# Patient Record
Sex: MALE | Race: WHITE | NOT HISPANIC OR LATINO | Employment: FULL TIME | URBAN - METROPOLITAN AREA
[De-identification: names, ages, dates, MRNs, and addresses within clinical notes are randomized per-mention and may not be internally consistent; named-entity substitution may affect disease eponyms.]

---

## 2017-01-26 ENCOUNTER — ALLSCRIPTS OFFICE VISIT (OUTPATIENT)
Dept: OTHER | Facility: OTHER | Age: 57
End: 2017-01-26

## 2018-01-12 NOTE — RESULT NOTES
Verified Results  ECHO COMPLETE WITH CONTRAST IF INDICATED [id number] 10:00AM Estiven Silva Order Number: [id number]     Test Name Result Flag Reference   ECHO COMPLETE WITH CONTRAST IF INDICATED (Report)     [facility], [address] [place] [address]   [phone]     Transthoracic Echocardiogram   2D, M-mode, Doppler, and Color Doppler     Study date: 2016     Patient: Chayo Gimenez   MR number: [id number]   Account number: [de-identified]   : 1960   Age: 48 years   Gender: Male   Status: Outpatient   Location: [facility]   Height: 69 in   Weight: 191 lb   BP: 150/ 82 mmHg     Indications: Cardiomyopathy  Diagnoses: I42 9 - Cardiomyopathy, unspecified     Sonographer: Walsh RCS   Primary Physician: Kade Blair DO   Referring Physician: Patty Miranda MD   Group: [facility]   Interpreting Physician: Patty Miranda MD     SUMMARY     LEFT VENTRICLE:   Ejection fraction was estimated to be 50 %  MITRAL VALVE:   There was suggestion of mild flail motion of one of the chords  This has been   noted in the past    There was trace to mild regurgitation  TRICUSPID VALVE:   There was trace regurgitation  SUMMARY MEASUREMENTS   Unspecified Scan Mode measurements:   Aortic Valve:  HR was 73 {H  B }/min  Left Ventricle:  HR was 61 {H  B }/min  HISTORY: PRIOR HISTORY: Congestive heart failure  Cardiomyopathy  Risk factors:   hypertension and a history of current cigarette use (within the last month)  PRIOR PROCEDURES: Diagnostic cath  PROCEDURE: The study was performed in the [address] [place] [address]  This   was a routine study  The transthoracic approach was used  The study included   complete 2D imaging, M-mode, complete spectral Doppler, and color Doppler  Image quality was adequate  LEFT VENTRICLE: Size was normal  Ejection fraction was estimated to be 50 %     DOPPLER: Left ventricular diastolic function parameters were normal      RIGHT VENTRICLE: The size was normal  Systolic function was normal  Wall   thickness was normal      LEFT ATRIUM: Size was normal      RIGHT ATRIUM: Size was normal      MITRAL VALVE: Valve structure was normal  There was normal leaflet separation  There was suggestion of mild flail motion of one of the chords  This has been   noted in the past  DOPPLER: The transmitral velocity was within the normal   range  There was no evidence for stenosis  There was trace to mild   regurgitation  AORTIC VALVE: The valve was trileaflet  Leaflets exhibited mildly increased   thickness and normal cuspal separation  DOPPLER: Transaortic velocity was   within the normal range  There was no evidence for stenosis  There was no   regurgitation  TRICUSPID VALVE: The valve structure was normal  There was normal leaflet   separation  DOPPLER: The transtricuspid velocity was within the normal range  There was no evidence for stenosis  There was trace regurgitation  PULMONIC VALVE: Leaflets exhibited normal thickness, no calcification, and   normal cuspal separation  DOPPLER: The transpulmonic velocity was within the   normal range  There was no regurgitation  PERICARDIUM: There was no pericardial effusion  The pericardium was normal in   appearance  AORTA: The root exhibited normal size  SYSTEM MEASUREMENT TABLES     Apical four chamber   4 chamber Left Atrium Volume Index; Planimetry; End Systole; Apical four   chamber;: 16 65 cm2 Left Ventricular End Diastolic Volume; Method of Disks,   Single Plane; Apical four chamber;: 81 6 ml Left Ventricular End Systolic   Volume; Method of Disks, Single Plane; Apical four chamber;: 36 1 ml Right   Atrium Systolic Area; Planimetry; End Systole; Apical four chamber;: 19 37 cm2   Right Ventricular Internal Diastolic Dimension; End Diastole;  Apical four   chamber;: 42 mm   TAPSE: 20 9 mm     Unspecified Scan Mode Aortic Root Diameter; End Systole;: 36 8 mm   Gradient Pressure, Peak; Simplified Bernoulli; Antegrade Flow; Systole;: 5 4   mm[Hg] Gradient pressure, average; Simplified Bernoulli; Antegrade Flow;   Systole;: 3 1 mm[Hg]   HR: 73 {H  B }/min   Left atrial diameter; End Diastole;: 36 mm   Cardiac Output; Teichholz; Systole;: 3 7 L/min   HR: 61 {H  B }/min   Interventricular Septum Diastolic Thickness; Teichholz; End Diastole;: 7 7 mm   Left Ventricle Internal End Diastolic Dimension; Teichholz;: 49 4 mm   Left Ventricle Internal Systolic Dimension; Teichholz; End Systole;: 35 4 mm   Left Ventricle Posterior Wall Diastolic Thickness; Teichholz; End Diastole;:   7 6 mm   Left Ventricular Ejection Fraction; Teichholz;: 54 5 %   Stroke volume;  Teichholz; Systole;: 62 7 ml   Mitral Valve Area; Area by Pressure Half-Time; Systole;: 2 72 cm2   Mitral Valve E to A Ratio; Systole;: 1 38     [facility]     Prepared and electronically signed by     Quirino Cotter MD   Signed 14-Jul-2016 15:07:09

## 2018-01-14 VITALS
BODY MASS INDEX: 24.88 KG/M2 | HEIGHT: 69 IN | HEART RATE: 86 BPM | WEIGHT: 168 LBS | SYSTOLIC BLOOD PRESSURE: 158 MMHG | DIASTOLIC BLOOD PRESSURE: 80 MMHG | OXYGEN SATURATION: 97 %

## 2018-02-06 ENCOUNTER — HOSPITAL ENCOUNTER (OUTPATIENT)
Facility: HOSPITAL | Age: 58
Setting detail: OBSERVATION
LOS: 1 days | Discharge: HOME | End: 2018-02-06
Attending: EMERGENCY MEDICINE | Admitting: GENERAL PRACTICE
Payer: COMMERCIAL

## 2018-02-06 ENCOUNTER — APPOINTMENT (EMERGENCY)
Dept: RADIOLOGY | Facility: HOSPITAL | Age: 58
End: 2018-02-06
Payer: COMMERCIAL

## 2018-02-06 ENCOUNTER — APPOINTMENT (INPATIENT)
Dept: NON INVASIVE DIAGNOSTICS | Facility: HOSPITAL | Age: 58
End: 2018-02-06
Attending: INTERNAL MEDICINE
Payer: COMMERCIAL

## 2018-02-06 ENCOUNTER — APPOINTMENT (INPATIENT)
Dept: NON INVASIVE DIAGNOSTICS | Facility: HOSPITAL | Age: 58
End: 2018-02-06
Payer: COMMERCIAL

## 2018-02-06 VITALS
DIASTOLIC BLOOD PRESSURE: 83 MMHG | SYSTOLIC BLOOD PRESSURE: 128 MMHG | BODY MASS INDEX: 23.7 KG/M2 | RESPIRATION RATE: 16 BRPM | WEIGHT: 160 LBS | TEMPERATURE: 97.7 F | OXYGEN SATURATION: 98 % | HEIGHT: 69 IN | HEART RATE: 73 BPM

## 2018-02-06 DIAGNOSIS — I42.9 CARDIOMYOPATHY (HCC): ICD-10-CM

## 2018-02-06 DIAGNOSIS — R07.9 CHEST PAIN: ICD-10-CM

## 2018-02-06 DIAGNOSIS — R55 NEAR SYNCOPE: Primary | ICD-10-CM

## 2018-02-06 PROBLEM — I10 HYPERTENSION: Chronic | Status: ACTIVE | Noted: 2018-02-06

## 2018-02-06 PROBLEM — J45.909 ASTHMA: Chronic | Status: ACTIVE | Noted: 2018-02-06

## 2018-02-06 PROBLEM — F17.200 TOBACCO DEPENDENCE: Chronic | Status: ACTIVE | Noted: 2018-02-06

## 2018-02-06 LAB
ALBUMIN SERPL BCP-MCNC: 4.4 G/DL (ref 3.5–5)
ALP SERPL-CCNC: 47 U/L (ref 46–116)
ALT SERPL W P-5'-P-CCNC: 26 U/L (ref 12–78)
ANION GAP SERPL CALCULATED.3IONS-SCNC: 10 MMOL/L (ref 4–13)
ANION GAP SERPL CALCULATED.3IONS-SCNC: 8 MMOL/L (ref 4–13)
AST SERPL W P-5'-P-CCNC: 14 U/L (ref 5–45)
ATRIAL RATE: 77 BPM
BASOPHILS # BLD AUTO: 0.1 THOUSANDS/ΜL (ref 0–0.1)
BASOPHILS NFR BLD AUTO: 1 % (ref 0–1)
BILIRUB SERPL-MCNC: 0.3 MG/DL (ref 0.2–1)
BUN SERPL-MCNC: 21 MG/DL (ref 5–25)
BUN SERPL-MCNC: 23 MG/DL (ref 5–25)
CALCIUM SERPL-MCNC: 9.5 MG/DL (ref 8.3–10.1)
CALCIUM SERPL-MCNC: 9.8 MG/DL (ref 8.3–10.1)
CHEST PAIN STATEMENT: NORMAL
CHLORIDE SERPL-SCNC: 100 MMOL/L (ref 100–108)
CHLORIDE SERPL-SCNC: 102 MMOL/L (ref 100–108)
CHOLEST SERPL-MCNC: 201 MG/DL (ref 50–200)
CO2 SERPL-SCNC: 27 MMOL/L (ref 21–32)
CO2 SERPL-SCNC: 29 MMOL/L (ref 21–32)
CREAT SERPL-MCNC: 0.82 MG/DL (ref 0.6–1.3)
CREAT SERPL-MCNC: 0.88 MG/DL (ref 0.6–1.3)
EOSINOPHIL # BLD AUTO: 0.06 THOUSAND/ΜL (ref 0–0.61)
EOSINOPHIL NFR BLD AUTO: 1 % (ref 0–6)
ERYTHROCYTE [DISTWIDTH] IN BLOOD BY AUTOMATED COUNT: 12.8 % (ref 11.6–15.1)
ERYTHROCYTE [DISTWIDTH] IN BLOOD BY AUTOMATED COUNT: 12.8 % (ref 11.6–15.1)
GFR SERPL CREATININE-BSD FRML MDRD: 96 ML/MIN/1.73SQ M
GFR SERPL CREATININE-BSD FRML MDRD: 99 ML/MIN/1.73SQ M
GLUCOSE SERPL-MCNC: 106 MG/DL (ref 65–140)
GLUCOSE SERPL-MCNC: 112 MG/DL (ref 65–140)
HCT VFR BLD AUTO: 45.8 % (ref 36.5–49.3)
HCT VFR BLD AUTO: 46.2 % (ref 36.5–49.3)
HDLC SERPL-MCNC: 87 MG/DL (ref 40–60)
HGB BLD-MCNC: 15.9 G/DL (ref 12–17)
HGB BLD-MCNC: 15.9 G/DL (ref 12–17)
HOLD SPECIMEN: NORMAL
LDLC SERPL CALC-MCNC: 101 MG/DL (ref 0–100)
LYMPHOCYTES # BLD AUTO: 1.29 THOUSANDS/ΜL (ref 0.6–4.47)
LYMPHOCYTES NFR BLD AUTO: 17 % (ref 14–44)
MAX DIASTOLIC BP: 80 MMHG
MAX HEART RATE: 153 BPM
MAX PREDICTED HEART RATE: 164 BPM
MAX. SYSTOLIC BP: 200 MMHG
MCH RBC QN AUTO: 31.6 PG (ref 26.8–34.3)
MCH RBC QN AUTO: 31.9 PG (ref 26.8–34.3)
MCHC RBC AUTO-ENTMCNC: 34.4 G/DL (ref 31.4–37.4)
MCHC RBC AUTO-ENTMCNC: 34.7 G/DL (ref 31.4–37.4)
MCV RBC AUTO: 92 FL (ref 82–98)
MCV RBC AUTO: 92 FL (ref 82–98)
MONOCYTES # BLD AUTO: 0.44 THOUSAND/ΜL (ref 0.17–1.22)
MONOCYTES NFR BLD AUTO: 6 % (ref 4–12)
NEUTROPHILS # BLD AUTO: 5.84 THOUSANDS/ΜL (ref 1.85–7.62)
NEUTS SEG NFR BLD AUTO: 75 % (ref 43–75)
NRBC BLD AUTO-RTO: 0 /100 WBCS
P AXIS: 68 DEGREES
PLATELET # BLD AUTO: 316 THOUSANDS/UL (ref 149–390)
PLATELET # BLD AUTO: 324 THOUSANDS/UL (ref 149–390)
PMV BLD AUTO: 9 FL (ref 8.9–12.7)
PMV BLD AUTO: 9.1 FL (ref 8.9–12.7)
POTASSIUM SERPL-SCNC: 4.4 MMOL/L (ref 3.5–5.3)
POTASSIUM SERPL-SCNC: 5.2 MMOL/L (ref 3.5–5.3)
PR INTERVAL: 130 MS
PROT SERPL-MCNC: 7.8 G/DL (ref 6.4–8.2)
PROTOCOL NAME: NORMAL
QRS AXIS: 75 DEGREES
QRSD INTERVAL: 100 MS
QT INTERVAL: 380 MS
QTC INTERVAL: 430 MS
RBC # BLD AUTO: 4.99 MILLION/UL (ref 3.88–5.62)
RBC # BLD AUTO: 5.03 MILLION/UL (ref 3.88–5.62)
SODIUM SERPL-SCNC: 137 MMOL/L (ref 136–145)
SODIUM SERPL-SCNC: 139 MMOL/L (ref 136–145)
T WAVE AXIS: 70 DEGREES
TARGET HR FORMULA: NORMAL
TEST INDICATION: NORMAL
TIME IN EXERCISE PHASE: NORMAL
TRIGL SERPL-MCNC: 64 MG/DL
TROPONIN I SERPL-MCNC: <0.02 NG/ML
VENTRICULAR RATE: 77 BPM
WBC # BLD AUTO: 7.82 THOUSAND/UL (ref 4.31–10.16)
WBC # BLD AUTO: 8.67 THOUSAND/UL (ref 4.31–10.16)

## 2018-02-06 PROCEDURE — 93005 ELECTROCARDIOGRAM TRACING: CPT | Performed by: PHYSICIAN ASSISTANT

## 2018-02-06 PROCEDURE — 99223 1ST HOSP IP/OBS HIGH 75: CPT | Performed by: PHYSICIAN ASSISTANT

## 2018-02-06 PROCEDURE — 93010 ELECTROCARDIOGRAM REPORT: CPT | Performed by: INTERNAL MEDICINE

## 2018-02-06 PROCEDURE — 85025 COMPLETE CBC W/AUTO DIFF WBC: CPT | Performed by: EMERGENCY MEDICINE

## 2018-02-06 PROCEDURE — 84484 ASSAY OF TROPONIN QUANT: CPT | Performed by: EMERGENCY MEDICINE

## 2018-02-06 PROCEDURE — 85027 COMPLETE CBC AUTOMATED: CPT | Performed by: PHYSICIAN ASSISTANT

## 2018-02-06 PROCEDURE — 84484 ASSAY OF TROPONIN QUANT: CPT | Performed by: PHYSICIAN ASSISTANT

## 2018-02-06 PROCEDURE — 80048 BASIC METABOLIC PNL TOTAL CA: CPT | Performed by: PHYSICIAN ASSISTANT

## 2018-02-06 PROCEDURE — 80053 COMPREHEN METABOLIC PANEL: CPT | Performed by: EMERGENCY MEDICINE

## 2018-02-06 PROCEDURE — 93351 STRESS TTE COMPLETE: CPT | Performed by: INTERNAL MEDICINE

## 2018-02-06 PROCEDURE — 99236 HOSP IP/OBS SAME DATE HI 85: CPT | Performed by: PHYSICIAN ASSISTANT

## 2018-02-06 PROCEDURE — 99285 EMERGENCY DEPT VISIT HI MDM: CPT

## 2018-02-06 PROCEDURE — 93005 ELECTROCARDIOGRAM TRACING: CPT

## 2018-02-06 PROCEDURE — 71046 X-RAY EXAM CHEST 2 VIEWS: CPT

## 2018-02-06 PROCEDURE — 36415 COLL VENOUS BLD VENIPUNCTURE: CPT

## 2018-02-06 PROCEDURE — 93350 STRESS TTE ONLY: CPT

## 2018-02-06 PROCEDURE — 80061 LIPID PANEL: CPT | Performed by: PHYSICIAN ASSISTANT

## 2018-02-06 PROCEDURE — 99244 OFF/OP CNSLTJ NEW/EST MOD 40: CPT | Performed by: INTERNAL MEDICINE

## 2018-02-06 RX ORDER — ALBUTEROL SULFATE 90 UG/1
1 AEROSOL, METERED RESPIRATORY (INHALATION) EVERY 4 HOURS PRN
Qty: 1 INHALER | Refills: 0 | Status: SHIPPED | OUTPATIENT
Start: 2018-02-06

## 2018-02-06 RX ORDER — ACETAMINOPHEN 325 MG/1
650 TABLET ORAL EVERY 4 HOURS PRN
Status: DISCONTINUED | OUTPATIENT
Start: 2018-02-06 | End: 2018-02-06 | Stop reason: HOSPADM

## 2018-02-06 RX ORDER — ASPIRIN 81 MG/1
81 TABLET, CHEWABLE ORAL DAILY
Status: DISCONTINUED | OUTPATIENT
Start: 2018-02-06 | End: 2018-02-06 | Stop reason: HOSPADM

## 2018-02-06 RX ORDER — MONTELUKAST SODIUM 10 MG/1
10 TABLET ORAL DAILY
Status: DISCONTINUED | OUTPATIENT
Start: 2018-02-06 | End: 2018-02-06 | Stop reason: HOSPADM

## 2018-02-06 RX ORDER — LOSARTAN POTASSIUM 25 MG/1
25 TABLET ORAL DAILY
Status: DISCONTINUED | OUTPATIENT
Start: 2018-02-06 | End: 2018-02-06 | Stop reason: HOSPADM

## 2018-02-06 RX ORDER — LOSARTAN POTASSIUM 25 MG/1
1 TABLET ORAL DAILY
COMMUNITY
End: 2018-04-18 | Stop reason: SDUPTHER

## 2018-02-06 RX ORDER — CARVEDILOL 3.12 MG/1
3.12 TABLET ORAL 2 TIMES DAILY
Status: DISCONTINUED | OUTPATIENT
Start: 2018-02-06 | End: 2018-02-06 | Stop reason: HOSPADM

## 2018-02-06 RX ORDER — ONDANSETRON 2 MG/ML
4 INJECTION INTRAMUSCULAR; INTRAVENOUS EVERY 6 HOURS PRN
Status: DISCONTINUED | OUTPATIENT
Start: 2018-02-06 | End: 2018-02-06 | Stop reason: HOSPADM

## 2018-02-06 RX ORDER — MONTELUKAST SODIUM 10 MG/1
TABLET ORAL
COMMUNITY

## 2018-02-06 RX ORDER — CARVEDILOL 3.12 MG/1
1 TABLET ORAL 2 TIMES DAILY
COMMUNITY
End: 2018-05-10 | Stop reason: SDUPTHER

## 2018-02-06 RX ORDER — ALBUTEROL SULFATE 90 UG/1
1 AEROSOL, METERED RESPIRATORY (INHALATION) EVERY 4 HOURS PRN
Status: DISCONTINUED | OUTPATIENT
Start: 2018-02-06 | End: 2018-02-06 | Stop reason: HOSPADM

## 2018-02-06 RX ADMIN — CARVEDILOL 3.12 MG: 3.12 TABLET, FILM COATED ORAL at 08:06

## 2018-02-06 RX ADMIN — LOSARTAN POTASSIUM 25 MG: 25 TABLET, FILM COATED ORAL at 08:06

## 2018-02-06 RX ADMIN — MONTELUKAST SODIUM 10 MG: 10 TABLET, FILM COATED ORAL at 08:06

## 2018-02-06 RX ADMIN — CARVEDILOL 3.12 MG: 3.12 TABLET, FILM COATED ORAL at 17:42

## 2018-02-06 RX ADMIN — ASPIRIN 81 MG 81 MG: 81 TABLET ORAL at 08:06

## 2018-02-06 RX ADMIN — FLUTICASONE PROPIONATE AND SALMETEROL 1 PUFF: 50; 250 POWDER RESPIRATORY (INHALATION) at 10:19

## 2018-02-06 RX ADMIN — TIOTROPIUM BROMIDE 18 MCG: 18 CAPSULE ORAL; RESPIRATORY (INHALATION) at 10:19

## 2018-02-06 RX ADMIN — ENOXAPARIN SODIUM 40 MG: 40 INJECTION SUBCUTANEOUS at 08:06

## 2018-02-06 NOTE — ED NOTES
Ten minute called to Noam Reed RN  Made aware that patient will need tele monitor on arrival to unit        Albertine Labs, RN  02/06/18 5944

## 2018-02-06 NOTE — ED PROVIDER NOTES
History  Chief Complaint   Patient presents with    Shortness of Breath     pt co of sob, neck pain and tingeling in both hands, +cold sweats, onset yesterday      HPI  65 y o  male presents with 4 hours of substernal chest pain without radiation  Patient describes moderate dull ache that came on suddenly while placing groceries, has been continuous and has improved since arrival  Patient states nothing worsens the pain and nothing improves the pain  Patient denies exertional component to his chest pain  Patient states he became diaphoretic and lightheaded during the episode with near syncope  Patient recalls all of the episode but was told by one of his coworkers that he suddenly became pale  Patient has an unclear history of a prior cardiomyopathy  Per the patient, ejection fraction 25% that upon most recent echocardiogram was noted to be 50%  Patient associates dyspnea that has resolved, tingling in the bilateral hands that has also improved, diaphoresis at the time of the onset, nausea earlier that has improved; denies fever/chills, cough, hemoptysis, weakness, dizziness, back pain, syncope, focal weakness or numbness, leg pain or swelling  All other review of systems reviewed and noted to be negative  The patient affirms any history of congestive heart failure and valvular head disease  Patient denies any recent change in medications  Patient denies any use of alcohol or illicit drugs  Objective:  PHYSICAL EXAM:  Constitutional :  Non-toxic  Well developed, well-nourished with no acute distress  Eyes:  PERRL, EOMI  No resting nystagmus or with gaze fixation  HENT: Atraumatic  Neck: no carotid bruit, no tenderness to palpitation  CV:  Regular rate and rhythm, no murmur  Peripheral pulses intact and equal   Respiratory:  Lungs clear to auscultation bilaterally  Abdomen:  Soft, non-tender, non-distended    Back:  No vertebral tenderness  Skin:  Normal color, warm and dry  Extremities: Non-tender, no pedal edema  Neuro:  Alert and answers questions appropriately  Medical Decision Making    Patient presenting with multiple symptoms, most concerning for chest pain and near syncopal episode  These represent a broad differential   EKG obtained and demonstrates normal sinus rhythm  CBC will be obtained to evaluate hemoglobin and hematocrit to identify potential for anemia (specifically hematocrit less than 30) due to high risk factor  Considering patient's chest pain and dyspnea, will obtain cardiac evaluation  Patient has cardiomyopathy considered a high risk factor for syncope  Patient will be placed in observation for further cardiac monitoring and evaluation by appropriate specialists if any events identified  Patient updated and agreed with the plan  HEART SCORE  History: 1   -Highly suspicious +2, Moderately suspicious +1   -Likelihood ratios: radiation to both arms (2 6), similar to prior ischemia (2 2), change in pattern over 24 hours (2 0)  EK (ST changes +2, Other abnormalities +1)  Age: 1 (greater 65 +2, 45-65 +1)  Risk factors: 2 (3 or more +2, 1-2 factors +1)  Troponin: 0 (greater than 3 times limit +2)  Total: 4    Regarding patient's chest pain, patient has a HEART score of 4  EKG obtained and reviewed independently by myself which demonstrated normal sinus rhythm no acute ST segment changes  CXR obtained and reviewed independently by myself; this did not demonstrate any acute abnormalities  Labs obtained and reviewed  Patient's HEART score demonstrated high risk for ACS and, patient likely to benefit from continued observation in the hospital for serial monitoring of troponin and cardiac monitoring  Patient in agreement with this plan  Prior to Admission Medications   Prescriptions Last Dose Informant Patient Reported?  Taking?   aspirin 81 MG tablet   Yes Yes   Sig: Take 1 tablet by mouth daily   carvedilol (COREG) 3 125 mg tablet   Yes No Sig: Take 1 tablet by mouth 2 (two) times a day   fluticasone-salmeterol (ADVAIR DISKUS) 250-50 mcg/dose inhaler   Yes No   Sig: Inhale   losartan (COZAAR) 25 mg tablet   Yes No   Sig: Take 1 tablet by mouth daily   montelukast (SINGULAIR) 10 mg tablet   Yes No   Sig: Take by mouth   tiotropium (SPIRIVA HANDIHALER) 18 mcg inhalation capsule   Yes No   Sig: Place into inhaler and inhale      Facility-Administered Medications: None       Past Medical History:   Diagnosis Date    Asthma     Hypertension        History reviewed  No pertinent surgical history  History reviewed  No pertinent family history  I have reviewed and agree with the history as documented      Social History   Substance Use Topics    Smoking status: Current Every Day Smoker     Packs/day: 0 50     Types: Cigarettes    Smokeless tobacco: Never Used    Alcohol use No        Review of Systems    Physical Exam  ED Triage Vitals   Temperature Pulse Respirations Blood Pressure SpO2   02/06/18 0050 02/06/18 0047 02/06/18 0047 02/06/18 0047 02/06/18 0047   97 6 °F (36 4 °C) 78 18 155/88 100 %      Temp Source Heart Rate Source Patient Position - Orthostatic VS BP Location FiO2 (%)   02/06/18 0050 02/06/18 0047 02/06/18 0047 02/06/18 0047 --   Oral Monitor Sitting Right arm       Pain Score       02/06/18 0047       5           Orthostatic Vital Signs  Vitals:    02/06/18 0430 02/06/18 0555 02/06/18 0700 02/06/18 1546   BP: 121/78 131/88 144/91 128/83   Pulse: 67 66 63 73   Patient Position - Orthostatic VS:  Sitting Lying Lying       Physical Exam    ED Medications  Medications - No data to display    Diagnostic Studies  Results Reviewed     Procedure Component Value Units Date/Time    Troponin I [[id number]]  (Normal) Collected:  02/06/18 0235    Lab Status:  Final result Specimen:  Blood from Arm, Right Updated:  02/06/18 0421     Troponin I <0 02 ng/mL     Narrative:         [employer] Chemistry analyzer 99% cutoff is > 0 04 ng/mL in network labs    o cTnI 99% cutoff is useful only when applied to patients in the clinical setting of myocardial ischemia  o cTnI 99% cutoff should be interpreted in the context of clinical history, ECG findings and possibly cardiac imaging to establish correct diagnosis  o cTnI 99% cutoff may be suggestive but clearly not indicative of a coronary event without the clinical setting of myocardial ischemia  Comprehensive metabolic panel [[id number]] Collected:  02/06/18 0235    Lab Status:  Final result Specimen:  Blood from Arm, Right Updated:  02/06/18 0256     Sodium 137 mmol/L      Potassium 5 2 mmol/L      Chloride 102 mmol/L      CO2 27 mmol/L      Anion Gap 8 mmol/L      BUN 23 mg/dL      Creatinine 0 88 mg/dL      Glucose 106 mg/dL      Calcium 9 8 mg/dL      AST 14 U/L      ALT 26 U/L      Alkaline Phosphatase 47 U/L      Total Protein 7 8 g/dL      Albumin 4 4 g/dL      Total Bilirubin 0 30 mg/dL      eGFR 96 ml/min/1 73sq m     Narrative:         National Kidney Disease Education Program recommendations are as follows:  GFR calculation is accurate only with a steady state creatinine  Chronic Kidney disease less than 60 ml/min/1 73 sq  meters  Kidney failure less than 15 ml/min/1 73 sq  meters      CBC and differential [[id number]]  (Normal) Collected:  02/06/18 0235    Lab Status:  Final result Specimen:  Blood from Arm, Right Updated:  02/06/18 0240     WBC 7 82 Thousand/uL      RBC 5 03 Million/uL      Hemoglobin 15 9 g/dL      Hematocrit 46 2 %      MCV 92 fL      MCH 31 6 pg      MCHC 34 4 g/dL      RDW 12 8 %      MPV 9 0 fL      Platelets 607 Thousands/uL      nRBC 0 /100 WBCs      Neutrophils Relative 75 %      Lymphocytes Relative 17 %      Monocytes Relative 6 %      Eosinophils Relative 1 %      Basophils Relative 1 %      Neutrophils Absolute 5 84 Thousands/µL      Lymphocytes Absolute 1 29 Thousands/µL      Monocytes Absolute 0 44 Thousand/µL      Eosinophils Absolute 0 06 Thousand/µL      Basophils Absolute 0 10 Thousands/µL                  X-ray chest 2 views   ED Interpretation by Jose Luis El MD (02/06 1535)   No acute findings  Final Result by Christiano Mcleod MD (98/30 1584)      No active pulmonary disease              Workstation performed: [id number]                    Procedures  Procedures       Phone Contacts  ED Phone Contact    ED Course  ED Course                                MDM  CritCare Time    Disposition  Final diagnoses:   Near syncope   Chest pain   Cardiomyopathy [facility])     Time reflects when diagnosis was documented in both MDM as applicable and the Disposition within this note     Time User Action Codes Description Comment    2/6/2018  5:15 AM Gordan Pelt Add [R55] Near syncope     2/6/2018  5:15 AM Gordan Pelt Add [R07 9] Chest pain     2/6/2018  5:15 AM Gordan Pelt Add [I42 9] Cardiomyopathy [facility])       ED Disposition     ED Disposition Condition Comment    Admit  Case was discussed with YORDY and the patient's admission status was agreed to be Admission Status: inpatient status to the service of Dr Mayra Edmondson     Follow up With Specialties Details Why Contact Info    Korin Flores DO Family Medicine Schedule an appointment as soon as possible for a visit in 1 day(s)  [address] [place] [address]      Kaylee Fu MD Cardiology Schedule an appointment as soon as possible for a visit in 1 week(s)  [address] [place] [address]  [phone]          Discharge Medication List as of 2/6/2018  6:48 PM      CONTINUE these medications which have CHANGED    Details   albuterol (PROAIR HFA) 90 mcg/act inhaler Inhale 1 puff every 4 (four) hours as needed for wheezing or shortness of breath, Starting Tue 2/6/2018, Normal         CONTINUE these medications which have NOT CHANGED    Details   aspirin 81 MG tablet Take 1 tablet by mouth daily, Starting Thu 2/19/2015, Historical Med      carvedilol (COREG) 3 125 mg tablet Take 1 tablet by mouth 2 (two) times a day, Historical Med      fluticasone-salmeterol (ADVAIR DISKUS) 250-50 mcg/dose inhaler Inhale, Historical Med      losartan (COZAAR) 25 mg tablet Take 1 tablet by mouth daily, Historical Med      montelukast (SINGULAIR) 10 mg tablet Take by mouth, Historical Med      tiotropium (SPIRIVA HANDIHALER) 18 mcg inhalation capsule Place into inhaler and inhale, Historical Med           No discharge procedures on file      ED Provider  Electronically Signed by           Lien Garcia MD  02/08/18 1939

## 2018-02-06 NOTE — CONSULTS
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 a 65y o  year old male who has a history of asthma, hypertension, cardiomyopathy  Patient presented to [facility] Emergency room, with complaints of chest pain that started at 1 o'clock in the morning  Patient was at work and broke out into a cold sweat, and he had chest pain  He states he stopped what he was doing and the symptoms resolved  He resumed his work and began to feel the symptoms again  He states at this time he was also short of breath  He came to the ED for evaluation  He has no history of cad, with negative cardiac cath in the past  He does have history of cardiomyopathy with EF at 25%, but later resolving to 50%  REVIEW OF SYSTEMS:  Constitutional:  Denies fever or chills   Eyes:  Denies change in visual acuity   HENT:  Denies nasal congestion or sore throat   Respiratory: +shortness of breath Denies cough  Cardiovascular: +chest pain, cold sweats Denies edema   GI:  Denies abdominal pain, nausea, vomiting, bloody stools or diarrhea   :  Denies dysuria, frequency, difficulty in micturition and nocturia  Musculoskeletal:  Denies back pain or joint pain   Neurologic:  Denies headache, focal weakness or sensory changes   Endocrine:  Denies polyuria or polydipsia   Lymphatic:  Denies swollen glands   Psychiatric:  Denies depression or anxiety     Historical Information   Past Medical History:   Diagnosis Date    Asthma     Hypertension      History reviewed  No pertinent surgical history    History   Alcohol Use No     History   Drug Use No     History   Smoking Status    Current Every Day Smoker    Packs/day: 0 50    Types: Cigarettes   Smokeless Tobacco    Never Used     Family History: History reviewed  No pertinent family history  MEDS & ALLERGIES:  all current active meds have been reviewed and current meds:   Current Facility-Administered Medications   Medication Dose Route Frequency    acetaminophen (TYLENOL) tablet 650 mg  650 mg Oral Q4H PRN    albuterol (PROVENTIL HFA,VENTOLIN HFA) inhaler 1 puff  1 puff Inhalation Q4H PRN    aspirin chewable tablet 81 mg  81 mg Oral Daily    carvedilol (COREG) tablet 3 125 mg  3 125 mg Oral BID    enoxaparin (LOVENOX) subcutaneous injection 40 mg  40 mg Subcutaneous Daily    fluticasone-salmeterol (ADVAIR) 250-50 mcg/dose inhaler 1 puff  1 puff Inhalation Q12H [address]    losartan (COZAAR) tablet 25 mg  25 mg Oral Daily    montelukast (SINGULAIR) tablet 10 mg  10 mg Oral Daily    ondansetron (ZOFRAN) injection 4 mg  4 mg Intravenous Q6H PRN    tiotropium (SPIRIVA) capsule for inhaler 18 mcg  18 mcg Inhalation Daily        Allergies   Allergen Reactions    Naproxen GI Intolerance       OBJECTIVE:  Vitals:   Vitals:    02/06/18 0700   BP: 144/91   Pulse: 63   Resp: 16   Temp: 98 7 °F (37 1 °C)   SpO2: 97%     Body mass index is 23 63 kg/m²  Systolic (17KXM), EIF:322 , Min:121 , ALLA:338     Diastolic (73FKT), YON:50, Min:78, Max:91      Intake/Output Summary (Last 24 hours) at 02/06/18 1148  Last data filed at 02/06/18 0843   Gross per 24 hour   Intake              280 ml   Output                0 ml   Net              280 ml     Weight (last 2 days)     Date/Time   Weight    02/06/18 0047  72 6 (160)            Invasive Devices     Peripheral Intravenous Line            Peripheral IV 02/06/18 Right Forearm less than 1 day                PHYSICAL EXAMS:  General:  Patient is not in acute distress, laying in the bed comfortably, awake, alert responding to commands  Head: Normocephalic, Atraumatic    HEENT:  Both pupils normal-size atraumatic, normocephalic, nonicteric  Neck:  JVP not raised  Trachea central  Respiratory:  Bronchovascular breathing all over the chest without any accompaniment  Cardiovascular:  S1-S2 normal RRR without any murmur  or rub  GI:  Abdomen soft nontender   Liver and spleen normal size  Musculoskeletal:  No edema  Integument:  No skin rashes or ulceration  Lymphatic:  No cervical or inguinal lymphadenopathy  Neurologic:  Patient is awake alert, responding to command, well-oriented to time and place and person    LABORATORY RESULTS:    Results from last 7 days  Lab Units 02/06/18  0940 02/06/18 0605 02/06/18  0235   TROPONIN I ng/mL <0 02 <0 02 <0 02     CBC with diff:   Results from last 7 days  Lab Units 02/06/18 0605 02/06/18  0235   WBC Thousand/uL 8 67 7 82   HEMOGLOBIN g/dL 15 9 15 9   HEMATOCRIT % 45 8 46 2   MCV fL 92 92   PLATELETS Thousands/uL 324 316   MCH pg 31 9 31 6   MCHC g/dL 34 7 34 4   RDW % 12 8 12 8   MPV fL 9 1 9 0   NRBC AUTO /100 WBCs  --  0       CMP:  Results from last 7 days  Lab Units 02/06/18  0605 02/06/18  0235   SODIUM mmol/L 139 137   POTASSIUM mmol/L 4 4 5 2   CHLORIDE mmol/L 100 102   CO2 mmol/L 29 27   ANION GAP mmol/L 10 8   BUN mg/dL 21 23   CREATININE mg/dL 0 82 0 88   GLUCOSE RANDOM mg/dL 112 106   CALCIUM mg/dL 9 5 9 8   AST U/L  --  14   ALT U/L  --  26   ALK PHOS U/L  --  47   TOTAL PROTEIN g/dL  --  7 8   BILIRUBIN TOTAL mg/dL  --  0 30   EGFR ml/min/1 73sq m 99 96       BMP:  Results from last 7 days  Lab Units 02/06/18  0605 02/06/18  0235   SODIUM mmol/L 139 137   POTASSIUM mmol/L 4 4 5 2   CHLORIDE mmol/L 100 102   CO2 mmol/L 29 27   BUN mg/dL 21 23   CREATININE mg/dL 0 82 0 88   GLUCOSE RANDOM mg/dL 112 106   CALCIUM mg/dL 9 5 9 8                              Lipid Profile:   Lab Results   Component Value Date    CHOL 201 (H) 02/06/2018     Lab Results   Component Value Date    HDL 87 (H) 02/06/2018     Lab Results   Component Value Date    LDLCALC 101 (H) 02/06/2018     Lab Results   Component Value Date    TRIG 64 2018       Cardiac testing:   Results for orders placed during the hospital encounter of 16   Echo complete with contrast if indicated    Narrative [address], [facility]  [phone]    Transthoracic Echocardiogram  2D, M-mode, Doppler, and Color Doppler    Study date:  2016    Patient: Isai Zamorano  MR number: [id number]  Account number: [de-identified]  : 1960  Age: 48 years  Gender: Male  Status: Outpatient  Location: [facility]  Height: 69 in  Weight: 191 lb  BP: 150/ 82 mmHg    Indications: Cardiomyopathy  Diagnoses: I42 9 - Cardiomyopathy, unspecified    Sonographer:  Stafford RCS  Primary Physician:  Collette Marshal, DO  Referring Physician:  Ricci Gill MD  Group:  [facility]  Interpreting Physician:  Ricci Gill MD    SUMMARY    LEFT VENTRICLE:  Ejection fraction was estimated to be 50 %  MITRAL VALVE:  There was suggestion of mild flail motion of one of the chords  This has been  noted in the past   There was trace to mild regurgitation  TRICUSPID VALVE:  There was trace regurgitation  SUMMARY MEASUREMENTS  Unspecified Scan Mode measurements:  Aortic Valve:   HR was 73 /min  Left Ventricle:   HR was 61 /min  HISTORY: PRIOR HISTORY: Congestive heart failure  Cardiomyopathy  Risk factors:  hypertension and a history of current cigarette use (within the last month)  PRIOR PROCEDURES: Diagnostic cath  PROCEDURE: The study was performed in the [address] [place] [address]  This  was a routine study  The transthoracic approach was used  The study included  complete 2D imaging, M-mode, complete spectral Doppler, and color Doppler  Image quality was adequate  LEFT VENTRICLE: Size was normal  Ejection fraction was estimated to be 50 %    DOPPLER: Left ventricular diastolic function parameters were normal     RIGHT VENTRICLE: The size was normal  Systolic function was normal  Wall  thickness was normal     LEFT ATRIUM: Size was normal     RIGHT ATRIUM: Size was normal     MITRAL VALVE: Valve structure was normal  There was normal leaflet separation  There was suggestion of mild flail motion of one of the chords  This has been  noted in the past  DOPPLER: The transmitral velocity was within the normal  range  There was no evidence for stenosis  There was trace to mild  regurgitation  AORTIC VALVE: The valve was trileaflet  Leaflets exhibited mildly increased  thickness and normal cuspal separation  DOPPLER: Transaortic velocity was  within the normal range  There was no evidence for stenosis  There was no  regurgitation  TRICUSPID VALVE: The valve structure was normal  There was normal leaflet  separation  DOPPLER: The transtricuspid velocity was within the normal range  There was no evidence for stenosis  There was trace regurgitation  PULMONIC VALVE: Leaflets exhibited normal thickness, no calcification, and  normal cuspal separation  DOPPLER: The transpulmonic velocity was within the  normal range  There was no regurgitation  PERICARDIUM: There was no pericardial effusion  The pericardium was normal in  appearance  AORTA: The root exhibited normal size  SYSTEM MEASUREMENT TABLES    Apical four chamber  4 chamber Left Atrium Volume Index; Planimetry; End Systole; Apical four  chamber;: 16 65 cm2 Left Ventricular End Diastolic Volume; Method of Disks,  Single Plane; Apical four chamber;: 81 6 ml Left Ventricular End Systolic  Volume; Method of Disks, Single Plane; Apical four chamber;: 36 1 ml Right  Atrium Systolic Area; Planimetry; End Systole; Apical four chamber;: 19 37 cm2  Right Ventricular Internal Diastolic Dimension; End Diastole; Apical four  chamber;: 42 mm  TAPSE: 20 9 mm    Unspecified Scan Mode  Aortic Root Diameter;  End Systole;: 36 8 mm  Gradient Pressure, Peak; Simplified Bernoulli; Antegrade Flow; Systole;: 5 4  mm[Hg] Gradient pressure, average; Simplified Bernoulli; Antegrade Flow;  Systole;: 3 1 mm[Hg]  HR: 73 /min  Left atrial diameter; End Diastole;: 36 mm  Cardiac Output; Teichholz; Systole;: 3 7 L/min  HR: 61 /min  Interventricular Septum Diastolic Thickness; Teichholz; End Diastole;: 7 7 mm  Left Ventricle Internal End Diastolic Dimension; Teichholz;: 49 4 mm  Left Ventricle Internal Systolic Dimension; Teichholz; End Systole;: 35 4 mm  Left Ventricle Posterior Wall Diastolic Thickness; Teichholz; End Diastole;:  7 6 mm  Left Ventricular Ejection Fraction; Teichholz;: 54 5 %  Stroke volume; Teichholz; Systole;: 62 7 ml  Mitral Valve Area; Area by Pressure Half-Time; Systole;: 2 72 cm2  Mitral Valve E to A Ratio; Systole;: 1 38    [facility] Accredited Echocardiography Laboratory    Prepared and electronically signed by    Chaka Gomez MD  Signed 14-Jul-2016 15:07:09       No results found for this or any previous visit  No procedure found  No results found for this or any previous visit  Imaging: I have personally reviewed pertinent reports  EKG reviewed personally:   Unavailable to me at this time    Telemetry reviewed personally:   SR    Assessment/Plan:  1  Chest pain appears atypical; troponins negative x3  Will arrange for stress echocardiogram to assess for ischemia  Will discontinue echocardiogram is patient will have it done as part of stress echo  Continue all medications  As on the stress test is found to be within normal limits patient can be discharged from a cardiac standpoint  Discussed with Kenzie Velasquez Internal Medicine Hospitalist    2  Tobacco abuse--smoking cessation advised    3  History of cardiomyopathy--last echo July 2016; EF 50%  Code Status: Level 1 - Full Code    Thank you for allowing us to participate in this patient's care  This pt will follow up with Dr Tatianna German once discharged       Counseling / Coordination of Care  Total floor / unit time spent today 35 minutes  Greater than 50% of total time was spent with the patient and / or family counseling and / or coordination of care  A description of the counseling / coordination of care: Review of history, current assessment, development of a plan  Laith Chaparro PA-C  2/6/2018,11:48 AM    Portions of the record may have been created with voice recognition software   Occasional wrong word or "sound a like" substitutions may have occurred due to the inherent limitations of voice recognition software   Read the chart carefully and recognize, using context, where substitutions have occurred

## 2018-02-06 NOTE — CASE MANAGEMENT
Notification of Inpatient Admission/Inpatient Authorization Request  This is a Notification of Inpatient Admission/Request for Inpatient Authorization to our facility [address] [facility]  Please be advised that this patient is currently in our facility under Inpatient Status  Below you will find the Attending Physician and Facilitys information including NPI# and contact information for the Utilization  assigned to the [facility] where the patient is receiving services  Please feel free to contact the Utilization Review Department with any questions  Patient Information:  PATIENT NAME: Hiral Arteaga  MRN: [id number]  YOB: 1960    PRESENTATION DATE: 2/6/2018 12:45 AM  IP ADMISSION DATE: 2/6/18 0516  DISCHARGE DATE: No discharge date for patient encounter  DISPOSITION: Final discharge disposition not confirmed    Attending Physician:  Jelena Frias MD MPH  [facility] ID- [id number]  [address] [place] [address]  Phone 1: [phone]  Fax: [phone]    Facility:  [address] [place] [address]  [phone]  Tax ID: [id number]  NPI: [id number]    [address] [place] [address] in the [facility] by Zeb Maldonado for 2017  Network Utilization Review Department  Phone: [phone]; Fax [phone]  ATTENTION: The Network Utilization Review Department is now centralized for our 7 Facilities  Make a note that we have a new phone and fax numbers for our Department  Please call with any questions or concerns to [phone] and carefully follow the prompts so that you are directed to the right person  All voicemails are confidential  Fax any determinations, approvals, denials, and requests for initial or continue stay review clinical to [phone]   Due to HIGH CALL volume, it would be easier if you could please send faxed requests to expedite your requests and in part, help us provide discharge notifications faster

## 2018-02-06 NOTE — H&P
H&P- Rai Chaney 1960, 65 y o  male MRN: [id number]    Unit/Bed#: -01 Encounter: [id number]    Primary Care Provider: Ibeth Mahmood DO   Date and time admitted to hospital: 2/6/2018 12:45 AM        * Chest pain   Assessment & Plan    Admit telemetry  Trend troponin  Cardiology consult  Echo pending  Lipid panel pending        Asthma   Assessment & Plan    Continue Spiriva, Advair, singular, p r n  albuterol inhaler  Tobacco dependence   Assessment & Plan    Cessation counseling        Hypertension   Assessment & Plan    Continue Cozaar and Coreg  VTE Prophylaxis: Enoxaparin (Lovenox)  / sequential compression device   Code Status:  Full  POLST: There is no POLST form on file for this patient (pre-hospital)  Discussion with family:  None    Anticipated Length of Stay:  Patient will be admitted on an Inpatient basis with an anticipated length of stay of  more than 2 midnights  Justification for Hospital Stay:  Cardiology evaluation    Total Time for Visit, including Counseling / Coordination of Care: 30 minutes  Greater than 50% of this total time spent on direct patient counseling and coordination of care  Chief Complaint:   Chest pain    History of Present Illness:    Rai Chaney is a 65 y o  male who presents with complaints chest pain that began around 1:00 a m  this evening  Patient states he felt nauseated with no vomiting  Patient states most of the pain is in his epigastric area  Patient states he feels short of breath  Patient states that he has a history of cardiomyopathy and was told he had an EF of 25% in the past   Upon review of records, EF was found to be 40 % in 2014 and 50% since 2015  Pt denies MI in the past       Review of Systems:    Review of Systems   Cardiovascular: Positive for chest pain  All other systems reviewed and are negative        Past Medical and Surgical History:     Past Medical History:   Diagnosis Date    Asthma     Hypertension History reviewed  No pertinent surgical history  Meds/Allergies:    Prior to Admission medications    Medication Sig Start Date End Date Taking? Authorizing Provider   aspirin 81 MG tablet Take 1 tablet by mouth daily 2/19/15  Yes Historical Provider, MD   albuterol (PROAIR HFA) 90 mcg/act inhaler Inhale    Historical Provider, MD   carvedilol (COREG) 3 125 mg tablet Take 1 tablet by mouth 2 (two) times a day    Historical Provider, MD   fluticasone-salmeterol (ADVAIR DISKUS) 250-50 mcg/dose inhaler Inhale    Historical Provider, MD   losartan (COZAAR) 25 mg tablet Take 1 tablet by mouth daily    Historical Provider, MD   montelukast (SINGULAIR) 10 mg tablet Take by mouth    Historical Provider, MD   tiotropium (SPIRIVA HANDIHALER) 18 mcg inhalation capsule Place into inhaler and inhale    Historical Provider, MD     I have reviewed home medications with patient personally  Allergies: Allergies   Allergen Reactions    Naproxen GI Intolerance       Social History:     Marital Status: /Civil Union   Occupation:  Night manager at [employer]  Patient Pre-hospital Living Situation:  Lives at home  Patient Pre-hospital Level of Mobility:  Ambulatory  Patient Pre-hospital Diet Restrictions:  None  Substance Use History:   History   Alcohol Use No     History   Smoking Status    Current Every Day Smoker    Packs/day: 0 50    Types: Cigarettes   Smokeless Tobacco    Never Used     History   Drug Use No       Family History:    History reviewed  No pertinent family history  Physical Exam:     Vitals:   Blood Pressure: 121/78 (02/06/18 0430)  Pulse: 67 (02/06/18 0430)  Temperature: 97 6 °F (36 4 °C) (02/06/18 0050)  Temp Source: Oral (02/06/18 0050)  Respirations: 20 (02/06/18 0430)  Height: 5' 9" (175 3 cm) (02/06/18 0047)  Weight - Scale: 72 6 kg (160 lb) (02/06/18 0047)  SpO2: 99 % (02/06/18 0430)    Physical Exam   Constitutional: He is oriented to person, place, and time   He appears well-developed and well-nourished  HENT:   Head: Normocephalic and atraumatic  Right Ear: External ear normal    Left Ear: External ear normal    Nose: Nose normal    Mouth/Throat: Oropharynx is clear and moist    Eyes: Conjunctivae and EOM are normal  Pupils are equal, round, and reactive to light  Neck: Normal range of motion  Cardiovascular: Normal rate, regular rhythm and normal heart sounds  Pulmonary/Chest: Effort normal and breath sounds normal    Abdominal: Soft  Bowel sounds are normal    Musculoskeletal: Normal range of motion  He exhibits no edema  Neurological: He is alert and oriented to person, place, and time  Skin: Skin is warm and dry  Psychiatric: He has a normal mood and affect  His behavior is normal  Judgment and thought content normal    Vitals reviewed  Additional Data:     Lab Results: I have personally reviewed pertinent reports  Results from last 7 days  Lab Units 02/06/18  0235   WBC Thousand/uL 7 82   HEMOGLOBIN g/dL 15 9   HEMATOCRIT % 46 2   PLATELETS Thousands/uL 316   NEUTROS PCT % 75   LYMPHS PCT % 17   MONOS PCT % 6   EOS PCT % 1       Results from last 7 days  Lab Units 02/06/18  0235   SODIUM mmol/L 137   POTASSIUM mmol/L 5 2   CHLORIDE mmol/L 102   CO2 mmol/L 27   BUN mg/dL 23   CREATININE mg/dL 0 88   CALCIUM mg/dL 9 8   TOTAL PROTEIN g/dL 7 8   BILIRUBIN TOTAL mg/dL 0 30   ALK PHOS U/L 47   ALT U/L 26   AST U/L 14   GLUCOSE RANDOM mg/dL 106           Imaging: I have personally reviewed pertinent reports  X-ray chest 2 views   ED Interpretation by Lopez Munguia MD (02/06 9914)   No acute findings  EKG, Pathology, and Other Studies Reviewed on Admission:   · EKG:  NSR    Allscripts / Epic Records Reviewed: Yes     ** Please Note: This note has been constructed using a voice recognition system   **

## 2018-02-06 NOTE — CASE MANAGEMENT
Initial Clinical Review    Admission: Date/Time/Statement: 2/6/18 @ 0516        IP    Cancelled      OBS  ORDER  ENTERED  2/6  @  7008    Orders Placed This Encounter   Procedures    Inpatient Admission (expected length of stay for this patient is greater than two midnights)     Standing Status:   Standing     Number of Occurrences:   1     Order Specific Question:   Admitting Physician     Answer:   Terence Lockwood [[id number]]     Order Specific Question:   Level of Care     Answer:   Med Surg [16]     Order Specific Question:   Estimated length of stay     Answer:   More than 2 Midnights     Order Specific Question:   Certification     Answer:   I certify that inpatient services are medically necessary for this patient for a duration of greater than two midnights  See H&P and MD Progress Notes for additional information about the patient's course of treatment  ED: Date/Time/Mode of Arrival:   ED Arrival Information     Expected Arrival Acuity Means of Arrival Escorted By Service Admission Type    - 2/6/2018 00:30 Urgent Walk-In Self General Medicine Urgent    Arrival Complaint    Shortness of Breath          Chief Complaint:   Chief Complaint   Patient presents with    Shortness of Breath     pt co of sob, neck pain and tingeling in both hands, +cold sweats, onset yesterday        History of Illness:    Hiral Arteaga is a 65 y o  male who presents with complaints chest pain that began around 1:00 a m  this evening  Patient states he felt nauseated with no vomiting  Patient states most of the pain is in his epigastric area  Patient states he feels short of breath  Patient states that he has a history of cardiomyopathy and was told he had an EF of 25% in the past   Upon review of records, EF was found to be 40 % in 2014 and 50% since 2015    Pt denies MI in the past       ED Vital Signs:   ED Triage Vitals   Temperature Pulse Respirations Blood Pressure SpO2   02/06/18 0050 02/06/18 0047 02/06/18 0047 02/06/18 0047 02/06/18 0047   97 6 °F (36 4 °C) 78 18 155/88 100 %      Temp Source Heart Rate Source Patient Position - Orthostatic VS BP Location FiO2 (%)   02/06/18 0050 02/06/18 0047 02/06/18 0047 02/06/18 0047 --   Oral Monitor Sitting Right arm       Pain Score       02/06/18 0047       5        Wt Readings from Last 1 Encounters:   02/06/18 72 6 kg (160 lb)       Vital Signs (abnormal):   above    Abnormal Labs/Diagnostic Test Results:    CXR:  NAD  Labs   No abnormalities    ED Treatment:   Medication Administration from 02/06/2018 0030 to 02/06/2018 0541     None          Past Medical/Surgical History: Active Ambulatory Problems     Diagnosis Date Noted    No Active Ambulatory Problems     Resolved Ambulatory Problems     Diagnosis Date Noted    No Resolved Ambulatory Problems     Past Medical History:   Diagnosis Date    Asthma     Hypertension        Admitting Diagnosis: Chest pain [R07 9]  Cardiomyopathy ([address] ) [I42 9]  SOB (shortness of breath) [R06 02]  Near syncope [R55]    Age/Sex: 65 y o  male    Assessment/Plan:      Chest pain   Assessment & Plan     Admit telemetry  Trend troponin  Cardiology consult  Echo pending  Lipid panel pending          Asthma   Assessment & Plan     Continue Spiriva, Advair, singular, p r n  albuterol inhaler           Tobacco dependence   Assessment & Plan     Cessation counseling          Hypertension   Assessment & Plan     Continue Cozaar and Coreg     Anticipated Length of Stay:  Patient will be admitted on an Inpatient basis with an anticipated length of stay of  more than 2 midnights     Justification for Hospital Stay:  Cardiology evaluation    Admission Orders:     OBS  ORDER    2/6  @     1073  Scheduled Meds:   Current Facility-Administered Medications:  acetaminophen 650 mg Oral Q4H PRN Mennie Soulier, PA-C   albuterol 1 puff Inhalation Q4H PRN Mennie Soulier, PA-C   aspirin 81 mg Oral Daily Mennie Soulier, PA-C   carvedilol 3 125 mg Oral BID Sandra Fancy Farm, PA-C   enoxaparin 40 mg Subcutaneous Daily Sandra Fancy Farm, PA-C   fluticasone-salmeterol 1 puff Inhalation Q12H [address] Sandra Fancy Farm, PA-C   losartan 25 mg Oral Daily Sandra Fancy Farm, PA-C   montelukast 10 mg Oral Daily Sandra Fancy Farm, PA-C   ondansetron 4 mg Intravenous Q6H PRN Sandra Fancy Farm, PA-C   tiotropium 18 mcg Inhalation Daily Sandra Fancy Farm, PA-C     Continuous Infusions:    PRN Meds:   acetaminophen    albuterol    ondansetron     Tele  Reg diet  Cons cardiology  Serial troponin  2 DE    Per  Cardiology  Consult    Chest pain appears atypical; troponins negative x3  Will arrange for stress echocardiogram to assess for ischemia  Will discontinue echocardiogram is patient will have it done as part of stress echo  Continue all medications  As on the stress test is found to be within normal limits patient can be discharged from a cardiac standpoint  Discussed with Sherin Lara Internal Medicine Hospitalist     2  Tobacco abuse--smoking cessation advised     3  History of cardiomyopathy--last echo July 2016; EF 50%  Thank you,  [facility] in the [facility] by Zeb Maldonado for 2017  Network Utilization Review Department  Phone: [phone]; Fax [phone]  ATTENTION: The Network Utilization Review Department is now centralized for our 7 Facilities  Make a note that we have a new phone and fax numbers for our Department  Please call with any questions or concerns to [phone] and carefully follow the prompts so that you are directed to the right person  All voicemails are confidential  Fax any determinations, approvals, denials, and requests for initial or continue stay review clinical to [phone]  Due to HIGH CALL volume, it would be easier if you could please send faxed requests to expedite your requests and in part, help us provide discharge notifications faster

## 2018-02-06 NOTE — DISCHARGE SUMMARY
Discharge Summary - [facility] Internal Medicine    Patient Information: Gavin Stock 65 y o  male MRN: [id number]  Unit/Bed#: -01 Encounter: [id number]    Discharging Physician / Practitioner: Randall Gonzales MD  PCP: Tammy Godoy DO  Admission Date: 2/6/2018  Discharge Date: 02/06/18    Reason for Admission: chest pain    Discharge Diagnoses:     Principal Problem (Resolved):    Chest pain  Active Problems:    Hypertension    Tobacco dependence    Asthma      Consultations During Hospital Stay:  · cardiology    Procedures Performed:     · Stress echo    Significant Findings:     ·     Incidental Findings:   ·      Test Results Pending at Discharge (will require follow up):   ·      Outpatient Tests Requested:  ·     Complications:      Hospital Course:     Gavin Stock is a 65 y o  male patient who originally presented to the hospital on 2/6/2018 due to chest pain  Patient has a history of cardiomyopathy with a previous ejection fraction of 25% later resolving to 50%  He developed chest pain prior to presenting to the emergency department serial cardiac enzymes were performed which were negative for myocardial infarction he was seen by Cardiology and a stress echo was performed prior to discharge which did not show wall motion abnormalities  Condition at Discharge: stable     Discharge Day Visit / Exam:     * Please refer to separate progress for these details *    Discharge instructions/Information to patient and family:   See after visit summary for information provided to patient and family  Provisions for Follow-Up Care:  See after visit summary for information related to follow-up care and any pertinent home health orders  Disposition:     Home    For Discharges to [facility]F:   · Not Applicable to this Patient - Not Applicable to this Patient    Planned Readmission:     Discharge Statement:  I spent 40 minutes discharging the patient   This time was spent on the day of discharge  I had direct contact with the patient on the day of discharge  Greater than 50% of the total time was spent examining patient, answering all patient questions, arranging and discussing plan of care with patient as well as directly providing post-discharge instructions  Additional time then spent on discharge activities  Discharge Medications:  See after visit summary for reconciled discharge medications provided to patient and family  ** Please Note: Dragon 360 Dictation voice to text software may have been used in the creation of this document   **

## 2018-02-10 LAB
ATRIAL RATE: 66 BPM
ATRIAL RATE: 75 BPM
P AXIS: 28 DEGREES
P AXIS: 40 DEGREES
PR INTERVAL: 130 MS
PR INTERVAL: 132 MS
QRS AXIS: 53 DEGREES
QRS AXIS: 61 DEGREES
QRSD INTERVAL: 100 MS
QRSD INTERVAL: 96 MS
QT INTERVAL: 390 MS
QT INTERVAL: 408 MS
QTC INTERVAL: 427 MS
QTC INTERVAL: 435 MS
T WAVE AXIS: 46 DEGREES
T WAVE AXIS: 50 DEGREES
VENTRICULAR RATE: 66 BPM
VENTRICULAR RATE: 75 BPM

## 2018-02-27 ENCOUNTER — OFFICE VISIT (OUTPATIENT)
Dept: CARDIOLOGY CLINIC | Facility: CLINIC | Age: 58
End: 2018-02-27
Payer: COMMERCIAL

## 2018-02-27 VITALS
BODY MASS INDEX: 24.44 KG/M2 | OXYGEN SATURATION: 97 % | WEIGHT: 165 LBS | DIASTOLIC BLOOD PRESSURE: 82 MMHG | SYSTOLIC BLOOD PRESSURE: 132 MMHG | HEART RATE: 71 BPM | HEIGHT: 69 IN

## 2018-02-27 DIAGNOSIS — I10 HYPERTENSION, ESSENTIAL: Primary | Chronic | ICD-10-CM

## 2018-02-27 DIAGNOSIS — F17.200 TOBACCO DEPENDENCE: Chronic | ICD-10-CM

## 2018-02-27 PROCEDURE — 99213 OFFICE O/P EST LOW 20 MIN: CPT | Performed by: INTERNAL MEDICINE

## 2018-02-27 RX ORDER — VARENICLINE TARTRATE 0.5 MG/1
0.5 TABLET, FILM COATED ORAL 2 TIMES DAILY
Qty: 60 TABLET | Refills: 4 | Status: SHIPPED | OUTPATIENT
Start: 2018-02-27 | End: 2021-03-02 | Stop reason: ALTCHOICE

## 2018-02-27 NOTE — PROGRESS NOTES
[facility]  [address]  [address]  Cardiology Follow Up    Stefany Wells  1960  [id number]      1  Hypertension, essential     2  Tobacco dependence         Chief Complaint   Patient presents with   [facility] f/u       Interval History:  Patient presents for follow-up visit  Patient was recently admitted to [facility] with symptoms of atypical chest pain  Patient subsequently had a stress echocardiogram which was negative for ischemia  Patient does have history of nonischemic cardiomyopathy from which she seems to have recovered  Patient still has history of smoking  Patient states that he wants to quit  Patient was on Chantix in the past which helped him  Patient would like to try this again  Patient Active Problem List   Diagnosis    Hypertension, essential    Tobacco dependence    Asthma     Past Medical History:   Diagnosis Date    Asthma     Hypertension      Social History     Social History    Marital status: /Civil Union     Spouse name: N/A    Number of children: N/A    Years of education: N/A     Occupational History    Not on file  Social History Main Topics    Smoking status: Current Every Day Smoker     Packs/day: 0 50     Types: Cigarettes    Smokeless tobacco: Never Used    Alcohol use No    Drug use: No    Sexual activity: Not on file     Other Topics Concern    Not on file     Social History Narrative    No narrative on file      No family history on file  No past surgical history on file      Current Outpatient Prescriptions:     albuterol (PROAIR HFA) 90 mcg/act inhaler, Inhale 1 puff every 4 (four) hours as needed for wheezing or shortness of breath, Disp: 1 Inhaler, Rfl: 0    aspirin 81 MG tablet, Take 1 tablet by mouth daily, Disp: , Rfl:     carvedilol (COREG) 3 125 mg tablet, Take 1 tablet by mouth 2 (two) times a day, Disp: , Rfl:     fluticasone-salmeterol (ADVAIR DISKUS) 250-50 mcg/dose inhaler, Inhale, Disp: , Rfl:     losartan (COZAAR) 25 mg tablet, Take 1 tablet by mouth daily, Disp: , Rfl:     montelukast (SINGULAIR) 10 mg tablet, Take by mouth, Disp: , Rfl:     tiotropium (SPIRIVA HANDIHALER) 18 mcg inhalation capsule, Place into inhaler and inhale, Disp: , Rfl:   Allergies   Allergen Reactions    Naproxen GI Intolerance       Labs:  Admission on 02/06/2018, Discharged on 02/06/2018   Component Date Value    Sodium 02/06/2018 137     Potassium 02/06/2018 5 2     Chloride 02/06/2018 102     CO2 02/06/2018 27     Anion Gap 02/06/2018 8     BUN 02/06/2018 23     Creatinine 02/06/2018 0 88     Glucose 02/06/2018 106     Calcium 02/06/2018 9 8     AST 02/06/2018 14     ALT 02/06/2018 26     Alkaline Phosphatase 02/06/2018 47     Total Protein 02/06/2018 7 8     Albumin 02/06/2018 4 4     Total Bilirubin 02/06/2018 0 30     eGFR 02/06/2018 96     WBC 02/06/2018 7 82     RBC 02/06/2018 5 03     Hemoglobin 02/06/2018 15 9     Hematocrit 02/06/2018 46 2     MCV 02/06/2018 92     MCH 02/06/2018 31 6     MCHC 02/06/2018 34 4     RDW 02/06/2018 12 8     MPV 02/06/2018 9 0     Platelets 53/84/6740 316     nRBC 02/06/2018 0     Neutrophils Relative 02/06/2018 75     Lymphocytes Relative 02/06/2018 17     Monocytes Relative 02/06/2018 6     Eosinophils Relative 02/06/2018 1     Basophils Relative 02/06/2018 1     Neutrophils Absolute 02/06/2018 5 84     Lymphocytes Absolute 02/06/2018 1 29     Monocytes Absolute 02/06/2018 0 44     Eosinophils Absolute 02/06/2018 0 06     Basophils Absolute 02/06/2018 0 10     Extra Tube 02/06/2018 Hold for add-ons       Troponin I 02/06/2018 <0 02     Ventricular Rate 02/06/2018 75     Atrial Rate 02/06/2018 75     TX Interval 02/06/2018 132     QRSD Interval 02/06/2018 100     QT Interval 02/06/2018 390     QTC Interval 02/06/2018 435     P Axis 02/06/2018 40     QRS Axis 02/06/2018 53     T Wave Axis 02/06/2018 46     Troponin I 02/06/2018 <0 02     Sodium 02/06/2018 139     Potassium 02/06/2018 4 4     Chloride 02/06/2018 100     CO2 02/06/2018 29     Anion Gap 02/06/2018 10     BUN 02/06/2018 21     Creatinine 02/06/2018 0 82     Glucose 02/06/2018 112     Calcium 02/06/2018 9 5     eGFR 02/06/2018 99     WBC 02/06/2018 8 67     RBC 02/06/2018 4 99     Hemoglobin 02/06/2018 15 9     Hematocrit 02/06/2018 45 8     MCV 02/06/2018 92     MCH 02/06/2018 31 9     MCHC 02/06/2018 34 7     RDW 02/06/2018 12 8     Platelets 96/60/4177 324     MPV 02/06/2018 9 1     Cholesterol 02/06/2018 201*    Triglycerides 02/06/2018 64     HDL, Direct 02/06/2018 87*    LDL Calculated 02/06/2018 101*    Troponin I 02/06/2018 <0 02     Protocol Name 02/06/2018 JUDI     Time In Exercise Phase 02/06/2018 00:04:31     MAX  SYSTOLIC BP 85/70/3848 453     Max Diastolic Bp 31/87/6929 80     Max Heart Rate 02/06/2018 153     Max Predicted Heart Rate 02/06/2018 164     Reason for Termination 02/06/2018                      Value:Fatigue  Leg discomfort  Dyspnea      Test Indication 02/06/2018 CP, SOB, CARDIOMYOPATHY     Target Hr Formular 02/06/2018 (220 - Age)*100%     Chest Pain Statement 02/06/2018 none     Ventricular Rate 02/06/2018 77     Atrial Rate 02/06/2018 77     IL Interval 02/06/2018 130     QRSD Interval 02/06/2018 100     QT Interval 02/06/2018 380     QTC Interval 02/06/2018 430     P Axis 02/06/2018 68     QRS Axis 02/06/2018 75     T Wave Axis 02/06/2018 70     Ventricular Rate 02/06/2018 66     Atrial Rate 02/06/2018 66     IL Interval 02/06/2018 130     QRSD Interval 02/06/2018 96     QT Interval 02/06/2018 408     QTC Interval 02/06/2018 427     P Axis 02/06/2018 28     QRS Axis 02/06/2018 61     T Wave Axis 02/06/2018 50      Imaging: X-ray Chest 2 Views    Result Date: 2/6/2018  Narrative: CHEST - DUAL ENERGY INDICATION:  Short of breath    Left anterior chest pain  History of asthma  Half pack per day smoker history  COMPARISON:  None VIEWS:  PA (including soft tissue/bone algorithms) and lateral projections IMAGES:  2 FINDINGS: The cardiac silhouette is unremarkable  Mediastinal and hilar contour within normal limits  The lungs are clear  No pleural effusions  No pneumothorax  Bony thorax unremarkable  Impression: No active pulmonary disease  Workstation performed: [id number]       Review of Systems:  REVIEW OF SYSTEMS:  Constitutional:  Denies fever or chills   Eyes:  Denies change in visual acuity   HENT:  Denies nasal congestion or sore throat   Respiratory:  Denies cough or shortness of breath   Cardiovascular:  Denies chest pain or edema   GI:  Denies abdominal pain, nausea, vomiting, bloody stools or diarrhea   :  Denies dysuria, frequency, difficulty in micturition and nocturia  Musculoskeletal:  Denies back pain or joint pain   Neurologic:  Denies headache, focal weakness or sensory changes   Endocrine:  Denies polyuria or polydipsia   Lymphatic:  Denies swollen glands   Psychiatric:  Denies depression or anxiety   Review of Systems    Physical Exam:    /82   Pulse 71   Ht 5' 9" (1 753 m)   Wt 74 8 kg (165 lb)   SpO2 97%   BMI 24 37 kg/m²     Physical Exam   PHYSICAL EXAM:  General:  Patient is not in acute distress   Head: Normocephalic, Atraumatic  HEENT:  Both pupils normal-size atraumatic, normocephalic, nonicteric  Neck:  JVP not raised  Trachea central  No carotid bruit  Respiratory:  normal breath sounds no crackles  no rhonchi  Cardiovascular:  Regular rate and rhythm no S3 no murmurs  GI:  Abdomen soft nontender  No organomegaly  Lymphatic:  No cervical or inguinal lymphadenopathy  Neurologic:  Patient is awake alert, oriented   Grossly nonfocal    Discussion/Summary:  Patient overall doing well from a cardiovascular standpoint    Patient as mentioned has history of nonischemic cardiomyopathy from which he seems to have recovered  Presently ejection fraction appears normal   Results of stress echocardiogram discussed with patient  Sensitivity and specificity of stress test discussed  Symptoms to watch out from cardiac standpoint which would indicate the need for further cardiac evaluation also discussed with patient prescription for Chantix sent  Patient is strongly counseled to quit smoking to prevent future cardiovascular events  Follow-up in 6 months  Follow-up with primary care physician

## 2018-04-18 DIAGNOSIS — I10 ESSENTIAL HYPERTENSION: Primary | ICD-10-CM

## 2018-04-18 RX ORDER — LOSARTAN POTASSIUM 25 MG/1
25 TABLET ORAL DAILY
Qty: 90 TABLET | Refills: 3 | Status: SHIPPED | OUTPATIENT
Start: 2018-04-18 | End: 2019-05-07 | Stop reason: SDUPTHER

## 2018-05-10 DIAGNOSIS — I10 HYPERTENSION, ESSENTIAL: Primary | ICD-10-CM

## 2018-05-10 RX ORDER — CARVEDILOL 3.12 MG/1
3.12 TABLET ORAL 2 TIMES DAILY
Qty: 180 TABLET | Refills: 3 | Status: SHIPPED | OUTPATIENT
Start: 2018-05-10 | End: 2019-06-27 | Stop reason: SDUPTHER

## 2019-05-07 DIAGNOSIS — I10 ESSENTIAL HYPERTENSION: ICD-10-CM

## 2019-05-07 RX ORDER — LOSARTAN POTASSIUM 25 MG/1
25 TABLET ORAL DAILY
Qty: 90 TABLET | Refills: 3 | Status: SHIPPED | OUTPATIENT
Start: 2019-05-07 | End: 2020-05-12

## 2019-06-27 DIAGNOSIS — I10 HYPERTENSION, ESSENTIAL: ICD-10-CM

## 2019-06-27 RX ORDER — CARVEDILOL 3.12 MG/1
TABLET ORAL
Qty: 180 TABLET | Refills: 3 | Status: SHIPPED | OUTPATIENT
Start: 2019-06-27 | End: 2019-07-01 | Stop reason: SDUPTHER

## 2019-07-01 DIAGNOSIS — I10 HYPERTENSION, ESSENTIAL: ICD-10-CM

## 2019-07-01 RX ORDER — CARVEDILOL 3.12 MG/1
TABLET ORAL
Qty: 180 TABLET | Refills: 3 | Status: SHIPPED | OUTPATIENT
Start: 2019-07-01 | End: 2020-07-07

## 2019-07-01 NOTE — TELEPHONE ENCOUNTER
Please send refill to pharmacy on file  Pt currently has no medication and would like meds to be filled asap

## 2020-01-12 ENCOUNTER — APPOINTMENT (EMERGENCY)
Dept: CT IMAGING | Facility: HOSPITAL | Age: 60
DRG: 176 | End: 2020-01-12
Payer: COMMERCIAL

## 2020-01-12 ENCOUNTER — APPOINTMENT (EMERGENCY)
Dept: RADIOLOGY | Facility: HOSPITAL | Age: 60
DRG: 176 | End: 2020-01-12
Payer: COMMERCIAL

## 2020-01-12 ENCOUNTER — HOSPITAL ENCOUNTER (INPATIENT)
Facility: HOSPITAL | Age: 60
LOS: 1 days | Discharge: HOME | DRG: 176 | End: 2020-01-14
Attending: EMERGENCY MEDICINE | Admitting: STUDENT IN AN ORGANIZED HEALTH CARE EDUCATION/TRAINING PROGRAM
Payer: COMMERCIAL

## 2020-01-12 DIAGNOSIS — R06.00 DYSPNEA: Primary | ICD-10-CM

## 2020-01-12 DIAGNOSIS — I26.99 PULMONARY EMBOLI (HCC): ICD-10-CM

## 2020-01-12 DIAGNOSIS — J45.909 ASTHMA: ICD-10-CM

## 2020-01-12 DIAGNOSIS — J44.1 COPD EXACERBATION (HCC): ICD-10-CM

## 2020-01-12 DIAGNOSIS — Z86.79 HISTORY OF CARDIOMYOPATHY: ICD-10-CM

## 2020-01-12 PROBLEM — Z72.0 TOBACCO ABUSE: Chronic | Status: ACTIVE | Noted: 2020-01-12

## 2020-01-12 LAB
ALBUMIN SERPL BCP-MCNC: 4.1 G/DL (ref 3.5–5)
ALP SERPL-CCNC: 60 U/L (ref 46–116)
ALT SERPL W P-5'-P-CCNC: 19 U/L (ref 12–78)
ANION GAP SERPL CALCULATED.3IONS-SCNC: 12 MMOL/L (ref 4–13)
APTT PPP: 26 SECONDS (ref 23–37)
AST SERPL W P-5'-P-CCNC: 9 U/L (ref 5–45)
BASE EX.OXY STD BLDV CALC-SCNC: 93.6 % (ref 60–80)
BASE EXCESS BLDV CALC-SCNC: -3.5 MMOL/L
BASOPHILS # BLD AUTO: 0.07 THOUSANDS/ΜL (ref 0–0.1)
BASOPHILS NFR BLD AUTO: 1 % (ref 0–1)
BILIRUB DIRECT SERPL-MCNC: 0.07 MG/DL (ref 0–0.2)
BILIRUB SERPL-MCNC: 0.2 MG/DL (ref 0.2–1)
BUN SERPL-MCNC: 16 MG/DL (ref 5–25)
CALCIUM SERPL-MCNC: 9 MG/DL (ref 8.3–10.1)
CHLORIDE SERPL-SCNC: 106 MMOL/L (ref 100–108)
CO2 SERPL-SCNC: 24 MMOL/L (ref 21–32)
CREAT SERPL-MCNC: 1.12 MG/DL (ref 0.6–1.3)
EOSINOPHIL # BLD AUTO: 0.01 THOUSAND/ΜL (ref 0–0.61)
EOSINOPHIL NFR BLD AUTO: 0 % (ref 0–6)
ERYTHROCYTE [DISTWIDTH] IN BLOOD BY AUTOMATED COUNT: 12.4 % (ref 11.6–15.1)
GFR SERPL CREATININE-BSD FRML MDRD: 72 ML/MIN/1.73SQ M
GLUCOSE SERPL-MCNC: 146 MG/DL (ref 65–140)
HCO3 BLDV-SCNC: 20.6 MMOL/L (ref 24–30)
HCT VFR BLD AUTO: 47 % (ref 36.5–49.3)
HGB BLD-MCNC: 16.2 G/DL (ref 12–17)
IMM GRANULOCYTES # BLD AUTO: 0.04 THOUSAND/UL (ref 0–0.2)
IMM GRANULOCYTES NFR BLD AUTO: 1 % (ref 0–2)
INR PPP: 0.88 (ref 0.84–1.19)
LACTATE SERPL-SCNC: 1.9 MMOL/L (ref 0.5–2)
LACTATE SERPL-SCNC: 2.1 MMOL/L (ref 0.5–2)
LYMPHOCYTES # BLD AUTO: 0.48 THOUSANDS/ΜL (ref 0.6–4.47)
LYMPHOCYTES NFR BLD AUTO: 7 % (ref 14–44)
MAGNESIUM SERPL-MCNC: 2 MG/DL (ref 1.6–2.6)
MCH RBC QN AUTO: 32.9 PG (ref 26.8–34.3)
MCHC RBC AUTO-ENTMCNC: 34.5 G/DL (ref 31.4–37.4)
MCV RBC AUTO: 96 FL (ref 82–98)
MONOCYTES # BLD AUTO: 0.29 THOUSAND/ΜL (ref 0.17–1.22)
MONOCYTES NFR BLD AUTO: 4 % (ref 4–12)
NEUTROPHILS # BLD AUTO: 6.28 THOUSANDS/ΜL (ref 1.85–7.62)
NEUTS SEG NFR BLD AUTO: 87 % (ref 43–75)
NRBC BLD AUTO-RTO: 0 /100 WBCS
NT-PROBNP SERPL-MCNC: 88 PG/ML
O2 CT BLDV-SCNC: 21.7 ML/DL
PCO2 BLDV: 34.8 MM HG (ref 42–50)
PH BLDV: 7.39 [PH] (ref 7.3–7.4)
PLATELET # BLD AUTO: 347 THOUSANDS/UL (ref 149–390)
PMV BLD AUTO: 9.1 FL (ref 8.9–12.7)
PO2 BLDV: 70.2 MM HG (ref 35–45)
POTASSIUM SERPL-SCNC: 4.8 MMOL/L (ref 3.5–5.3)
PROT SERPL-MCNC: 7.9 G/DL (ref 6.4–8.2)
PROTHROMBIN TIME: 12 SECONDS (ref 11.6–14.5)
RBC # BLD AUTO: 4.92 MILLION/UL (ref 3.88–5.62)
SODIUM SERPL-SCNC: 142 MMOL/L (ref 136–145)
TROPONIN I SERPL-MCNC: <0.02 NG/ML
WBC # BLD AUTO: 7.17 THOUSAND/UL (ref 4.31–10.16)

## 2020-01-12 PROCEDURE — 85730 THROMBOPLASTIN TIME PARTIAL: CPT | Performed by: EMERGENCY MEDICINE

## 2020-01-12 PROCEDURE — 99285 EMERGENCY DEPT VISIT HI MDM: CPT

## 2020-01-12 PROCEDURE — 71046 X-RAY EXAM CHEST 2 VIEWS: CPT

## 2020-01-12 PROCEDURE — 83735 ASSAY OF MAGNESIUM: CPT | Performed by: EMERGENCY MEDICINE

## 2020-01-12 PROCEDURE — 80048 BASIC METABOLIC PNL TOTAL CA: CPT | Performed by: EMERGENCY MEDICINE

## 2020-01-12 PROCEDURE — 85025 COMPLETE CBC W/AUTO DIFF WBC: CPT | Performed by: EMERGENCY MEDICINE

## 2020-01-12 PROCEDURE — 99220 PR INITIAL OBSERVATION CARE/DAY 70 MINUTES: CPT | Performed by: INTERNAL MEDICINE

## 2020-01-12 PROCEDURE — 36415 COLL VENOUS BLD VENIPUNCTURE: CPT | Performed by: EMERGENCY MEDICINE

## 2020-01-12 PROCEDURE — 94640 AIRWAY INHALATION TREATMENT: CPT

## 2020-01-12 PROCEDURE — 99285 EMERGENCY DEPT VISIT HI MDM: CPT | Performed by: EMERGENCY MEDICINE

## 2020-01-12 PROCEDURE — 84484 ASSAY OF TROPONIN QUANT: CPT | Performed by: EMERGENCY MEDICINE

## 2020-01-12 PROCEDURE — 82805 BLOOD GASES W/O2 SATURATION: CPT | Performed by: EMERGENCY MEDICINE

## 2020-01-12 PROCEDURE — 80076 HEPATIC FUNCTION PANEL: CPT | Performed by: EMERGENCY MEDICINE

## 2020-01-12 PROCEDURE — 85610 PROTHROMBIN TIME: CPT | Performed by: EMERGENCY MEDICINE

## 2020-01-12 PROCEDURE — 93005 ELECTROCARDIOGRAM TRACING: CPT

## 2020-01-12 PROCEDURE — 83605 ASSAY OF LACTIC ACID: CPT | Performed by: EMERGENCY MEDICINE

## 2020-01-12 PROCEDURE — 71275 CT ANGIOGRAPHY CHEST: CPT

## 2020-01-12 PROCEDURE — 83880 ASSAY OF NATRIURETIC PEPTIDE: CPT | Performed by: EMERGENCY MEDICINE

## 2020-01-12 RX ORDER — IPRATROPIUM BROMIDE AND ALBUTEROL SULFATE 2.5; .5 MG/3ML; MG/3ML
3 SOLUTION RESPIRATORY (INHALATION)
Status: DISCONTINUED | OUTPATIENT
Start: 2020-01-12 | End: 2020-01-13

## 2020-01-12 RX ORDER — ASPIRIN 81 MG/1
81 TABLET, CHEWABLE ORAL DAILY
Status: DISCONTINUED | OUTPATIENT
Start: 2020-01-13 | End: 2020-01-14 | Stop reason: HOSPADM

## 2020-01-12 RX ORDER — MONTELUKAST SODIUM 10 MG/1
10 TABLET ORAL DAILY
Status: DISCONTINUED | OUTPATIENT
Start: 2020-01-13 | End: 2020-01-14 | Stop reason: HOSPADM

## 2020-01-12 RX ORDER — ACETAMINOPHEN 325 MG/1
650 TABLET ORAL EVERY 6 HOURS PRN
Status: DISCONTINUED | OUTPATIENT
Start: 2020-01-12 | End: 2020-01-14 | Stop reason: HOSPADM

## 2020-01-12 RX ORDER — GUAIFENESIN 600 MG
600 TABLET, EXTENDED RELEASE 12 HR ORAL EVERY 12 HOURS SCHEDULED
Status: DISCONTINUED | OUTPATIENT
Start: 2020-01-12 | End: 2020-01-14 | Stop reason: HOSPADM

## 2020-01-12 RX ORDER — CARVEDILOL 3.12 MG/1
3.12 TABLET ORAL 2 TIMES DAILY WITH MEALS
Status: DISCONTINUED | OUTPATIENT
Start: 2020-01-13 | End: 2020-01-14 | Stop reason: HOSPADM

## 2020-01-12 RX ORDER — FLUTICASONE FUROATE AND VILANTEROL 100; 25 UG/1; UG/1
1 POWDER RESPIRATORY (INHALATION)
Status: DISCONTINUED | OUTPATIENT
Start: 2020-01-13 | End: 2020-01-14 | Stop reason: HOSPADM

## 2020-01-12 RX ORDER — NICOTINE 21 MG/24HR
1 PATCH, TRANSDERMAL 24 HOURS TRANSDERMAL DAILY
Status: DISCONTINUED | OUTPATIENT
Start: 2020-01-13 | End: 2020-01-14 | Stop reason: HOSPADM

## 2020-01-12 RX ORDER — LOSARTAN POTASSIUM 25 MG/1
25 TABLET ORAL DAILY
Status: DISCONTINUED | OUTPATIENT
Start: 2020-01-13 | End: 2020-01-14 | Stop reason: HOSPADM

## 2020-01-12 RX ORDER — ALBUTEROL SULFATE 90 UG/1
1 AEROSOL, METERED RESPIRATORY (INHALATION) EVERY 4 HOURS PRN
Status: DISCONTINUED | OUTPATIENT
Start: 2020-01-12 | End: 2020-01-14 | Stop reason: HOSPADM

## 2020-01-12 RX ORDER — HEPARIN SODIUM 5000 [USP'U]/ML
5000 INJECTION, SOLUTION INTRAVENOUS; SUBCUTANEOUS EVERY 8 HOURS SCHEDULED
Status: DISCONTINUED | OUTPATIENT
Start: 2020-01-12 | End: 2020-01-12

## 2020-01-12 RX ADMIN — HEPARIN SODIUM 5000 UNITS: 5000 INJECTION INTRAVENOUS; SUBCUTANEOUS at 22:56

## 2020-01-12 RX ADMIN — IOHEXOL 85 ML: 350 INJECTION, SOLUTION INTRAVENOUS at 21:38

## 2020-01-12 RX ADMIN — IPRATROPIUM BROMIDE AND ALBUTEROL SULFATE 3 ML: 2.5; .5 SOLUTION RESPIRATORY (INHALATION) at 21:04

## 2020-01-12 RX ADMIN — GUAIFENESIN 600 MG: 600 TABLET ORAL at 22:56

## 2020-01-13 PROBLEM — I26.99 PULMONARY EMBOLI (HCC): Status: ACTIVE | Noted: 2020-01-13

## 2020-01-13 LAB
ANION GAP SERPL CALCULATED.3IONS-SCNC: 13 MMOL/L (ref 4–13)
ATRIAL RATE: 71 BPM
ATRIAL RATE: 82 BPM
BASOPHILS # BLD AUTO: 0.06 THOUSANDS/ΜL (ref 0–0.1)
BASOPHILS NFR BLD AUTO: 1 % (ref 0–1)
BUN SERPL-MCNC: 14 MG/DL (ref 5–25)
CALCIUM SERPL-MCNC: 8.7 MG/DL (ref 8.3–10.1)
CHLORIDE SERPL-SCNC: 102 MMOL/L (ref 100–108)
CO2 SERPL-SCNC: 23 MMOL/L (ref 21–32)
CREAT SERPL-MCNC: 0.95 MG/DL (ref 0.6–1.3)
EOSINOPHIL # BLD AUTO: 0 THOUSAND/ΜL (ref 0–0.61)
EOSINOPHIL NFR BLD AUTO: 0 % (ref 0–6)
ERYTHROCYTE [DISTWIDTH] IN BLOOD BY AUTOMATED COUNT: 12.3 % (ref 11.6–15.1)
GFR SERPL CREATININE-BSD FRML MDRD: 88 ML/MIN/1.73SQ M
GLUCOSE P FAST SERPL-MCNC: 135 MG/DL (ref 65–99)
GLUCOSE SERPL-MCNC: 135 MG/DL (ref 65–140)
HCT VFR BLD AUTO: 45.8 % (ref 36.5–49.3)
HGB BLD-MCNC: 15.6 G/DL (ref 12–17)
IMM GRANULOCYTES # BLD AUTO: 0.08 THOUSAND/UL (ref 0–0.2)
IMM GRANULOCYTES NFR BLD AUTO: 1 % (ref 0–2)
LYMPHOCYTES # BLD AUTO: 0.9 THOUSANDS/ΜL (ref 0.6–4.47)
LYMPHOCYTES NFR BLD AUTO: 8 % (ref 14–44)
MCH RBC QN AUTO: 32.4 PG (ref 26.8–34.3)
MCHC RBC AUTO-ENTMCNC: 34.1 G/DL (ref 31.4–37.4)
MCV RBC AUTO: 95 FL (ref 82–98)
MONOCYTES # BLD AUTO: 0.6 THOUSAND/ΜL (ref 0.17–1.22)
MONOCYTES NFR BLD AUTO: 5 % (ref 4–12)
NEUTROPHILS # BLD AUTO: 9.98 THOUSANDS/ΜL (ref 1.85–7.62)
NEUTS SEG NFR BLD AUTO: 85 % (ref 43–75)
NRBC BLD AUTO-RTO: 0 /100 WBCS
P AXIS: 68 DEGREES
P AXIS: 73 DEGREES
PLATELET # BLD AUTO: 335 THOUSANDS/UL (ref 149–390)
PMV BLD AUTO: 9 FL (ref 8.9–12.7)
POTASSIUM SERPL-SCNC: 4.2 MMOL/L (ref 3.5–5.3)
PR INTERVAL: 126 MS
PR INTERVAL: 132 MS
QRS AXIS: 78 DEGREES
QRS AXIS: 81 DEGREES
QRSD INTERVAL: 100 MS
QRSD INTERVAL: 96 MS
QT INTERVAL: 372 MS
QT INTERVAL: 402 MS
QTC INTERVAL: 434 MS
QTC INTERVAL: 436 MS
RBC # BLD AUTO: 4.81 MILLION/UL (ref 3.88–5.62)
SODIUM SERPL-SCNC: 138 MMOL/L (ref 136–145)
T WAVE AXIS: 69 DEGREES
T WAVE AXIS: 74 DEGREES
TROPONIN I SERPL-MCNC: <0.02 NG/ML
TROPONIN I SERPL-MCNC: <0.02 NG/ML
VENTRICULAR RATE: 71 BPM
VENTRICULAR RATE: 82 BPM
WBC # BLD AUTO: 11.62 THOUSAND/UL (ref 4.31–10.16)

## 2020-01-13 PROCEDURE — 94664 DEMO&/EVAL PT USE INHALER: CPT

## 2020-01-13 PROCEDURE — 85025 COMPLETE CBC W/AUTO DIFF WBC: CPT | Performed by: INTERNAL MEDICINE

## 2020-01-13 PROCEDURE — 99222 1ST HOSP IP/OBS MODERATE 55: CPT | Performed by: PHYSICIAN ASSISTANT

## 2020-01-13 PROCEDURE — 84484 ASSAY OF TROPONIN QUANT: CPT | Performed by: INTERNAL MEDICINE

## 2020-01-13 PROCEDURE — 93005 ELECTROCARDIOGRAM TRACING: CPT

## 2020-01-13 PROCEDURE — 93010 ELECTROCARDIOGRAM REPORT: CPT | Performed by: INTERNAL MEDICINE

## 2020-01-13 PROCEDURE — 94760 N-INVAS EAR/PLS OXIMETRY 1: CPT

## 2020-01-13 PROCEDURE — 80048 BASIC METABOLIC PNL TOTAL CA: CPT | Performed by: INTERNAL MEDICINE

## 2020-01-13 PROCEDURE — 36415 COLL VENOUS BLD VENIPUNCTURE: CPT | Performed by: INTERNAL MEDICINE

## 2020-01-13 PROCEDURE — 94640 AIRWAY INHALATION TREATMENT: CPT

## 2020-01-13 PROCEDURE — 99232 SBSQ HOSP IP/OBS MODERATE 35: CPT | Performed by: PHYSICIAN ASSISTANT

## 2020-01-13 RX ORDER — ALBUTEROL SULFATE 2.5 MG/3ML
SOLUTION RESPIRATORY (INHALATION)
Status: COMPLETED
Start: 2020-01-13 | End: 2020-01-13

## 2020-01-13 RX ORDER — SODIUM CHLORIDE FOR INHALATION 0.9 %
3 VIAL, NEBULIZER (ML) INHALATION
Status: DISCONTINUED | OUTPATIENT
Start: 2020-01-13 | End: 2020-01-14 | Stop reason: HOSPADM

## 2020-01-13 RX ORDER — ALBUTEROL SULFATE 2.5 MG/3ML
2.5 SOLUTION RESPIRATORY (INHALATION) EVERY 4 HOURS PRN
Status: DISCONTINUED | OUTPATIENT
Start: 2020-01-13 | End: 2020-01-14 | Stop reason: HOSPADM

## 2020-01-13 RX ORDER — LEVALBUTEROL 1.25 MG/.5ML
1.25 SOLUTION, CONCENTRATE RESPIRATORY (INHALATION)
Status: DISCONTINUED | OUTPATIENT
Start: 2020-01-13 | End: 2020-01-14 | Stop reason: HOSPADM

## 2020-01-13 RX ADMIN — ISODIUM CHLORIDE 3 ML: 0.03 SOLUTION RESPIRATORY (INHALATION) at 22:29

## 2020-01-13 RX ADMIN — TIOTROPIUM BROMIDE 18 MCG: 18 CAPSULE ORAL; RESPIRATORY (INHALATION) at 09:28

## 2020-01-13 RX ADMIN — ENOXAPARIN SODIUM 70 MG: 80 INJECTION SUBCUTANEOUS at 09:37

## 2020-01-13 RX ADMIN — LEVALBUTEROL HYDROCHLORIDE 1.25 MG: 1.25 SOLUTION, CONCENTRATE RESPIRATORY (INHALATION) at 09:00

## 2020-01-13 RX ADMIN — LOSARTAN POTASSIUM 25 MG: 25 TABLET, FILM COATED ORAL at 09:30

## 2020-01-13 RX ADMIN — ISODIUM CHLORIDE 3 ML: 0.03 SOLUTION RESPIRATORY (INHALATION) at 09:00

## 2020-01-13 RX ADMIN — GUAIFENESIN 600 MG: 600 TABLET ORAL at 09:30

## 2020-01-13 RX ADMIN — MONTELUKAST SODIUM 10 MG: 10 TABLET, FILM COATED ORAL at 09:30

## 2020-01-13 RX ADMIN — ALBUTEROL SULFATE 1 PUFF: 90 AEROSOL, METERED RESPIRATORY (INHALATION) at 01:18

## 2020-01-13 RX ADMIN — LEVALBUTEROL HYDROCHLORIDE 1.25 MG: 1.25 SOLUTION, CONCENTRATE RESPIRATORY (INHALATION) at 22:29

## 2020-01-13 RX ADMIN — ENOXAPARIN SODIUM 70 MG: 80 INJECTION SUBCUTANEOUS at 20:38

## 2020-01-13 RX ADMIN — GUAIFENESIN 600 MG: 600 TABLET ORAL at 20:38

## 2020-01-13 RX ADMIN — ALBUTEROL SULFATE 2.5 MG: 2.5 SOLUTION RESPIRATORY (INHALATION) at 04:27

## 2020-01-13 RX ADMIN — ENOXAPARIN SODIUM 70 MG: 80 INJECTION SUBCUTANEOUS at 00:22

## 2020-01-13 RX ADMIN — FLUTICASONE FUROATE AND VILANTEROL TRIFENATATE 1 PUFF: 100; 25 POWDER RESPIRATORY (INHALATION) at 09:31

## 2020-01-13 RX ADMIN — NICOTINE 1 PATCH: 14 PATCH TRANSDERMAL at 09:30

## 2020-01-13 RX ADMIN — CARVEDILOL 3.12 MG: 3.12 TABLET, FILM COATED ORAL at 17:04

## 2020-01-13 RX ADMIN — ASPIRIN 81 MG 81 MG: 81 TABLET ORAL at 09:30

## 2020-01-13 RX ADMIN — ACETAMINOPHEN 650 MG: 325 TABLET, FILM COATED ORAL at 05:08

## 2020-01-13 RX ADMIN — CARVEDILOL 3.12 MG: 3.12 TABLET, FILM COATED ORAL at 07:46

## 2020-01-13 NOTE — UTILIZATION REVIEW
Notification of Inpatient Admission/Inpatient Authorization Request   This is a Notification of Inpatient Admission for [address]  Be advised that this patient was admitted to our facility under Inpatient Status  Contact Vanda Carvajal at [phone] for additional admission information  [address] [facility] DEPT DEDICATED Rahel Genao [phone]  Patient Name:   Hammad Mathis   YOB: 1960       [address]   [address]:   [address]   Tax ID: [id number]  NPI: [id number] Attending Provider/NPI: Nataly Jennings Md [[id number]]   Place of Service Code: 24     Place of Service Name:  [address] [place] [address]   Start Date: 1/13/20 1123     Discharge Date & Time: No discharge date for patient encounter  Type of Admission: Inpatient Status Discharge Disposition   (if discharged): Home/Self Care   Patient Diagnoses: SOB (shortness of breath) [R06 02]  Asthma [J45 909]     Orders: Admission Orders (From admission, onward)     Ordered        01/13/20 1123  Inpatient Admission  Once         01/12/20 2218  Place in Observation (expected length of stay for this patient is less than two midnights)  Once                    Assigned Utilization Review Contact: Vanda Carvajal  Utilization   Network Utilization Review Department  Phone: [phone]; Fax [phone]  Email: [email or web address]   ATTENTION PAYERS: Please call the assigned Utilization  directly with any questions or concerns ALL voicemails in the department are confidential  Send all requests for admission clinical reviews, approved or denied determinations and any other requests to dedicated fax number belonging to the campus where the patient is receiving treatment

## 2020-01-13 NOTE — QUICK NOTE
Radiology is reading the patient is having small subsegmental defects consistent with minor or small PE clot burden  Patient was started on therapeutic dose Lovenox

## 2020-01-13 NOTE — SOCIAL WORK
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, driving, independent in [employer]  Pt denies DME, no hx HHC or rehab  Pt denies inpatient treatment for mental health/substance abuse  Pt reports he has prescription plan, preferred pharmacy is [facility]  Pt reports wife will drive home @ discharge  CM reviewed d/c planning process including the following: identifying help at home, patient preference for d/c planning needs, Discharge Lounge, [facility] Meds to Bed program, availability of treatment team to discuss questions or concerns patient and/or family may have regarding understanding medications and recognizing signs and symptoms once discharged  CM also encouraged patient to follow up with all recommended appointments after discharge  Patient advised of importance for patient and family to participate in managing patients medical well being  CM following for d/c needs

## 2020-01-13 NOTE — ASSESSMENT & PLAN NOTE
Cycle cardiac enzymes  Telemetry monitoring  Echocardiogram to evaluate ejection fraction and valvular function-in the past patient with history of ejection fraction 15% most recent echo demonstrated 50%    O2 sat monitoring with supplemental O2 as needed to maintain sat 90% or greater by pulse oximetry  Supportive care

## 2020-01-13 NOTE — UTILIZATION REVIEW
Initial Clinical Review    Admission: Date/Time/Statement: OBS  1/12 2218 converted to IP on 1/13 @ 1123 for continued treatment of PE   Admitting Physician Ingrid De La Torre of Care Med Surg    Estimated length of stay More than 2 Midnights    Certification I certify that inpatient services are medically necessary for this patient for a duration of greater than two midnights  See H&P and MD Progress Notes for additional information about the patient's course of treatment  ED Arrival Information     Expected Arrival Acuity Means of Arrival Escorted By Service Admission Type    - 1/12/2020 20:07 Emergent Walk-In Spouse General Medicine Emergency    Arrival Complaint    SOB/asthma        Chief Complaint   Patient presents with    Shortness of Breath     Pt presents to ED with worsening sob  Pt has hx of copd and believes this is an exacerbation, but has also had a cold since friday  Denies fevers      Assessment/Plan: 62 yo male to ED from home w / worsened prod cough and SOB   Admitted OBS status w/ dyspnea plan to trend trop , tele , check echo , maintain O2 sat > 90 % , supportive care   PE ; wheezes     1/12 IM note  Radiology is reading the patient is having small subsegmental defects consistent with minor or small PE clot burden  Patient was started on therapeutic dose Lovenox    ED Triage Vitals   Temperature Pulse Respirations Blood Pressure SpO2   01/12/20 2012 01/12/20 2012 01/12/20 2012 01/12/20 2012 01/12/20 2012   97 9 °F (36 6 °C) 91 18 160/78 94 %      Temp Source Heart Rate Source Patient Position - Orthostatic VS BP Location FiO2 (%)   01/12/20 2012 01/12/20 2012 01/12/20 2012 01/12/20 2012 --   Oral Monitor Sitting Left arm       Pain Score       01/13/20 0131       No Pain        Wt Readings from Last 1 Encounters:   01/12/20 72 6 kg (160 lb)     Additional Vital Signs:   01/13/20 0745        159/80      Lying   01/13/20 0700        159/80         01/13/20 0231    80 24Abnormal   142/80  95 %       01/13/20 0107          97 %       01/12/20 2230    76  19  136/76  97 %  None (Room air)     01/12/20 2115    86  21  151/82  98 %  None (Room air)         Pertinent Labs/Diagnostic Test Results:   1/12 CXR   1/12 CTA chest - Filling defects involving anterior subsegmental branches of the right lower lobe suspicious for pulmonary embolism  No evidence of right heart strain    Emphysema    1/12 EKG NSR  1/12 Echo- pending   Results from last 7 days   Lab Units 01/13/20 0421 01/12/20 2046   WBC Thousand/uL 11 62* 7 17   HEMOGLOBIN g/dL 15 6 16 2   HEMATOCRIT % 45 8 47 0   PLATELETS Thousands/uL 335 347   NEUTROS ABS Thousands/µL 9 98* 6 28     Results from last 7 days   Lab Units 01/13/20 0421 01/12/20 2046   SODIUM mmol/L 138 142   POTASSIUM mmol/L 4 2 4 8   CHLORIDE mmol/L 102 106   CO2 mmol/L 23 24   ANION GAP mmol/L 13 12   BUN mg/dL 14 16   CREATININE mg/dL 0 95 1 12   EGFR ml/min/1 73sq m 88 72   CALCIUM mg/dL 8 7 9 0   MAGNESIUM mg/dL  --  2 0     Results from last 7 days   Lab Units 01/12/20 2046   AST U/L 9   ALT U/L 19   ALK PHOS U/L 60   TOTAL PROTEIN g/dL 7 9   ALBUMIN g/dL 4 1   TOTAL BILIRUBIN mg/dL 0 20   BILIRUBIN DIRECT mg/dL 0 07     Results from last 7 days   Lab Units 01/13/20 0421 01/12/20 2046   GLUCOSE RANDOM mg/dL 135 146*     Results from last 7 days   Lab Units 01/12/20 2046   PH NADIA  7 390   PCO2 NADIA mm Hg 34 8*   PO2 NADIA mm Hg 70 2*   HCO3 NADIA mmol/L 20 6*   BASE EXC NADIA mmol/L -3 5   O2 CONTENT NADIA ml/dL 21 7   O2 HGB, VENOUS % 93 6*     Results from last 7 days   Lab Units 01/13/20 0421 01/13/20  0021 01/12/20 2046   TROPONIN I ng/mL <0 02 <0 02 <0 02     Results from last 7 days   Lab Units 01/12/20 2046   PROTIME seconds 12 0   INR  0 88   PTT seconds 26     Results from last 7 days   Lab Units 01/12/20 2235 01/12/20 2046   LACTIC ACID mmol/L 1 9 2 1*     Results from last 7 days   Lab Units 01/12/20 2046   NT-PRO BNP pg/mL 88 ED Treatment:   Medication Administration from 01/12/2020 2007 to 01/13/2020 8579       Date/Time Order Dose Route Action     01/12/2020 2104 ipratropium-albuterol (DUO-NEB) 0 5-2 5 mg/3 mL inhalation solution 3 mL 3 mL Nebulization Given     01/13/2020 0118 albuterol (PROVENTIL HFA,VENTOLIN HFA) inhaler 1 puff 1 puff Inhalation Given     01/13/2020 0746 carvedilol (COREG) tablet 3 125 mg 3 125 mg Oral Given     01/13/2020 2111 acetaminophen (TYLENOL) tablet 650 mg 650 mg Oral Given     01/12/2020 2256 heparin (porcine) subcutaneous injection 5,000 Units 5,000 Units Subcutaneous Given     01/12/2020 2256 guaiFENesin (MUCINEX) 12 hr tablet 600 mg 600 mg Oral Given     01/13/2020 0022 enoxaparin (LOVENOX) subcutaneous injection 70 mg 70 mg Subcutaneous Given     01/13/2020 0427 albuterol (2 5 mg/3 mL) 0 083 % inhalation solution **ADS Override Pull** 2 5 mg  Given        Past Medical History:   Diagnosis Date    Asthma     Cardiomy[facility] [address] )     [facility]     Hypertension      Present on Admission:   Tobacco abuse   Hypertension, essential   Dyspnea   Asthma      Admitting Diagnosis: SOB (shortness of breath) [R06 02]  Asthma [J45 909]  Age/Sex: 63 y o  male  Admission Orders:  Scheduled Medications:    Medications:  aspirin 81 mg Oral Daily   carvedilol 3 125 mg Oral BID With Meals   enoxaparin 1 mg/kg Subcutaneous Q12H [address]   fluticasone-vilanterol 1 puff Inhalation Daily   guaiFENesin 600 mg Oral Q12H [place]   levalbuterol 1 25 mg Nebulization TID   losartan 25 mg Oral Daily   montelukast 10 mg Oral Daily   nicotine 1 patch Transdermal Daily   sodium chloride 3 mL Nebulization TID   tiotropium 18 mcg Inhalation Daily     Continuous IV Infusions:     PRN Meds:    acetaminophen 650 mg Oral Q6H PRN   albuterol 1 puff Inhalation Q4H PRN   albuterol 2 5 mg Nebulization Q4H PRN   iohexol 100 mL Intravenous Once in imaging     Tele   Cardiac diet   pulm consult    Network Utilization Review Department  [email or web address]  ATTENTION: Please call with any questions or concerns to [phone] and carefully listen to the prompts so that you are directed to the right person  All voicemails are confidential   Bonilla Montalvo all requests for admission clinical reviews, approved or denied determinations and any other requests to dedicated fax number below belonging to the campus where the patient is receiving treatment   List of dedicated fax numbers for the Facilities:  [address] DENIALS (Administrative/Medical Necessity) [phone]   [address] (Maternity/NICU/Pediatrics) [phone]   [facility] [phone]   [facility] [phone]   [address] [place] [address] [phone]   Reid Mcpherson [phone]   [address] [place] [address] [phone]   [facility]  [phone]   [address] [facility]  [address] [facility] [address] [facility] [phone]

## 2020-01-13 NOTE — RESPIRATORY THERAPY NOTE
RT Protocol Note  Bryant Gill 63 y o  male MRN: [id number]  Unit/Bed#: ED 06 Encounter: [id number]    Assessment    Principal Problem:    Dyspnea  Active Problems:    Hypertension, essential    Asthma    Tobacco abuse      Home Pulmonary Medications:  Albuterol MDI PRN  Spiriva  Advair       Past Medical History:   Diagnosis Date    Asthma     [facility] ([facility] )     COPD [facility]chronic obstructive pulmonary disease) ([facility] )     Hypertension      Social History     Socioeconomic History    Marital status: /Civil Union     Spouse name: None    Number of children: None    Years of education: None    Highest education level: None   Occupational History    None   Social Needs    Financial resource strain: None    Food insecurity:     Worry: None     Inability: None    Transportation needs:     Medical: None     Non-medical: None   Tobacco Use    Smoking status: Current Every Day Smoker     Packs/day: 0 50     Types: Cigarettes    Smokeless tobacco: Never Used   Substance and Sexual Activity    Alcohol use: No    Drug use: No    Sexual activity: None   Lifestyle    Physical activity:     Days per week: None     Minutes per session: None    Stress: None   Relationships    Social connections:     Talks on phone: None     Gets together: None     Attends Judaism service: None     Active member of club or organization: None     Attends meetings of clubs or organizations: None     Relationship status: None    Intimate partner violence:     Fear of current or ex partner: None     Emotionally abused: None     Physically abused: None     Forced sexual activity: None   Other Topics Concern    None   Social History Narrative    None       Subjective         Objective    Physical Exam:   Assessment Type: Assess only  Respiratory Pattern: Normal  Chest Assessment: Chest expansion symmetrical  Bilateral Breath Sounds: Diminished    Vitals:  Blood pressure 136/76, pulse 76, temperature 97 9 °F (36 6 °C), temperature source Oral, resp  rate 19, weight 72 6 kg (160 lb), SpO2 97 %  Imaging and other studies: I have personally reviewed pertinent reports  Plan    Respiratory Plan: Home Bronchodilator Patient pathway        Resp Comments: pt with history of asthma for which he takes albuterol MDI prn, spiriva and advair  Pt has no adverse breath sounds at this time, pt not indication for ATC txs at current change to xop and nss TID

## 2020-01-13 NOTE — PLAN OF CARE
Problem: PAIN - ADULT  Goal: Verbalizes/displays adequate comfort level or baseline comfort level  Description  Interventions:  - Encourage patient to monitor pain and request assistance  - Assess pain using appropriate pain scale  - Administer analgesics based on type and severity of pain and evaluate response  - Implement non-pharmacological measures as appropriate and evaluate response  - Consider cultural and social influences on pain and pain management  - Notify physician/advanced practitioner if interventions unsuccessful or patient reports new pain  Outcome: Progressing     Problem: INFECTION - ADULT  Goal: Absence or prevention of progression during hospitalization  Description  INTERVENTIONS:  - Assess and monitor for signs and symptoms of infection  - Monitor lab/diagnostic results  - Monitor all insertion sites, i e  indwelling lines, tubes, and drains  - Monitor endotracheal if appropriate and nasal secretions for changes in amount and color  - [place] appropriate cooling/warming therapies per order  - Administer medications as ordered  - Instruct and encourage patient and family to use good hand hygiene technique  - Identify and instruct in appropriate isolation precautions for identified infection/condition  Outcome: Progressing     Problem: SAFETY ADULT  Goal: Patient will remain free of falls  Description  INTERVENTIONS:  - Assess patient frequently for physical needs  -  Identify cognitive and physical deficits and behaviors that affect risk of falls    -  [place] fall precautions as indicated by assessment   - Educate patient/family on patient safety including physical limitations  - Instruct patient to call for assistance with activity based on assessment  - Modify environment to reduce risk of injury  - Consider OT/PT consult to assist with strengthening/mobility  Outcome: Progressing  Goal: Maintain or return to baseline ADL function  Description  INTERVENTIONS:  -  Assess patient's ability to carry out ADLs; assess patient's baseline for ADL function and identify physical deficits which impact ability to perform ADLs (bathing, care of mouth/teeth, toileting, grooming, dressing, etc )  - Assess/evaluate cause of self-care deficits   - Assess range of motion  - Assess patient's mobility; develop plan if impaired  - Assess patient's need for assistive devices and provide as appropriate  - Encourage maximum independence but intervene and supervise when necessary  - Involve family in performance of ADLs  - Assess for home care needs following discharge   - Consider OT consult to assist with ADL evaluation and planning for discharge  - Provide patient education as appropriate  Outcome: Progressing  Goal: Maintain or return mobility status to optimal level  Description  INTERVENTIONS:  - Assess patient's baseline mobility status (ambulation, transfers, stairs, etc )    - Identify cognitive and physical deficits and behaviors that affect mobility  - Identify mobility aids required to assist with transfers and/or ambulation (gait belt, sit-to-stand, lift, walker, cane, etc )  - [place] fall precautions as indicated by assessment  - Record patient progress and toleration of activity level on Mobility SBAR; progress patient to next Phase/Stage  - Instruct patient to call for assistance with activity based on assessment  - Consider rehabilitation consult to assist with strengthening/weightbearing, etc   Outcome: Progressing     Problem: DISCHARGE PLANNING  Goal: Discharge to home or other facility with appropriate resources  Description  INTERVENTIONS:  - Identify barriers to discharge w/patient and caregiver  - Arrange for needed discharge resources and transportation as appropriate  - Identify discharge learning needs (meds, wound care, etc )  - Arrange for interpretive services to assist at discharge as needed  - Refer to Case Management Department for coordinating discharge planning if the patient needs post-hospital services based on physician/advanced practitioner order or complex needs related to functional status, cognitive ability, or social support system  Outcome: Progressing     Problem: Knowledge Deficit  Goal: Patient/family/caregiver demonstrates understanding of disease process, treatment plan, medications, and discharge instructions  Description  Complete learning assessment and assess knowledge base    Interventions:  - Provide teaching at level of understanding  - Provide teaching via preferred learning methods  Outcome: Progressing     Problem: CARDIOVASCULAR - ADULT  Goal: Maintains optimal cardiac output and hemodynamic stability  Description  INTERVENTIONS:  - Monitor I/O, vital signs and rhythm  - Monitor for S/S and trends of decreased cardiac output  - Administer and titrate ordered vasoactive medications to optimize hemodynamic stability  - Assess quality of pulses, skin color and temperature  - Assess for signs of decreased coronary artery perfusion  - Instruct patient to report change in severity of symptoms  Outcome: Progressing  Goal: Absence of cardiac dysrhythmias or at baseline rhythm  Description  INTERVENTIONS:  - Continuous cardiac monitoring, vital signs, obtain 12 lead EKG if ordered  - Administer antiarrhythmic and heart rate control medications as ordered  - Monitor electrolytes and administer replacement therapy as ordered  Outcome: Progressing     Problem: RESPIRATORY - ADULT  Goal: Achieves optimal ventilation and oxygenation  Description  INTERVENTIONS:  - Assess for changes in respiratory status  - Assess for changes in mentation and behavior  - Position to facilitate oxygenation and minimize respiratory effort  - Oxygen administered by appropriate delivery if ordered  - Initiate smoking cessation education as indicated  - Encourage broncho-pulmonary hygiene including cough, deep breathe, Incentive Spirometry  - Assess the need for suctioning and aspirate as needed  - Assess and instruct to report SOB or any respiratory difficulty  - Respiratory Therapy support as indicated  Outcome: Progressing

## 2020-01-13 NOTE — ED PROVIDER NOTES
History  Chief Complaint   Patient presents with    Shortness of Breath     Pt presents to ED with worsening sob  Pt has hx of copd and believes this is an exacerbation, but has also had a cold since friday  Denies fevers      2-3 days of dyspnea, worsening, moderate  Feels like dyspnea from prior cardiomyopathy which pt reports was 4 years ago and due to viral illness and had EF of 15% which has returned to normal on subsequent Echos  He reports being seen this morning at [facility] where he was given prednisone, doxy, and nebulized beta agonist tx which have not improved his sxs therefore he comes here for further evaluation  He denies cough, hemoptysis, chest pain, leg pain, leg swelling, h/o VTE and recent immobilization  Also notes underlying COPD  Dyspnea is worse with lying flat and alleviated by sitting up and forward  No other sxs or concerns at this time  Prior to Admission Medications   Prescriptions Last Dose Informant Patient Reported? Taking?    albuterol (PROAIR HFA) 90 mcg/act inhaler  Self No No   Sig: Inhale 1 puff every 4 (four) hours as needed for wheezing or shortness of breath   aspirin 81 MG tablet  Self Yes No   Sig: Take 1 tablet by mouth daily   carvedilol (COREG) 3 125 mg tablet   No No   Si tab twice a day   fluticasone-salmeterol (ADVAIR DISKUS) 250-50 mcg/dose inhaler  Self Yes No   Sig: Inhale   losartan (COZAAR) 25 mg tablet   No No   Sig: Take 1 tablet (25 mg total) by mouth daily   montelukast (SINGULAIR) 10 mg tablet  Self Yes No   Sig: Take by mouth   tiotropium (SPIRIVA HANDIHALER) 18 mcg inhalation capsule  Self Yes No   Sig: Place into inhaler and inhale   varenicline (CHANTIX) 0 5 mg tablet   No No   Sig: Take 1 tablet (0 5 mg total) by mouth 2 (two) times a day      Facility-Administered Medications: None       Past Medical History:   Diagnosis Date    Asthma     [facility]omy[facility] )     COPD (chronic obstructive pulmonary disease) ([facility] )     Hypertension History reviewed  No pertinent surgical history  History reviewed  No pertinent family history  I have reviewed and agree with the history as documented  Social History     Tobacco Use    Smoking status: Current Every Day Smoker     Packs/day: 0 50     Types: Cigarettes    Smokeless tobacco: Never Used   Substance Use Topics    Alcohol use: No    Drug use: No        Review of Systems   Respiratory: Positive for shortness of breath  Negative for apnea, cough, choking, chest tightness, wheezing and stridor  Cardiovascular: Negative for chest pain, palpitations and leg swelling  All other systems reviewed and are negative  Physical Exam  Physical Exam   Constitutional: He is oriented to person, place, and time  He appears well-developed and well-nourished  No distress  HENT:   Head: Normocephalic and atraumatic  Eyes: Pupils are equal, round, and reactive to light  Conjunctivae and EOM are normal    Neck: Normal range of motion  Neck supple  No JVD present  Cardiovascular: Normal rate, regular rhythm, normal heart sounds and intact distal pulses  Exam reveals no gallop and no friction rub  No murmur heard  Pulmonary/Chest: Effort normal  No stridor  No respiratory distress  He has no wheezes  He has no rales  He exhibits no tenderness  Decreased breath sounds bilaterally  Normal WOB  No distress  Sitting upright and leaning forward  Abdominal: Soft  He exhibits no distension  There is no tenderness  Musculoskeletal: Normal range of motion  He exhibits no edema, tenderness or deformity  Neurological: He is alert and oriented to person, place, and time  No cranial nerve deficit or sensory deficit  He exhibits normal muscle tone  Coordination normal    Skin: Skin is warm and dry  Capillary refill takes less than 2 seconds  He is not diaphoretic  Nursing note and vitals reviewed        Vital Signs  ED Triage Vitals [01/12/20 2012]   Temperature Pulse Respirations Blood Pressure SpO2   97 9 °F (36 6 °C) 91 18 160/78 94 %      Temp Source Heart Rate Source Patient Position - Orthostatic VS BP Location FiO2 (%)   Oral Monitor Sitting Left arm --      Pain Score       --           Vitals:    01/12/20 2012 01/12/20 2115   BP: 160/78 151/82   Pulse: 91 86   Patient Position - Orthostatic VS: Sitting          Visual Acuity      ED Medications  Medications   ipratropium-albuterol (DUO-NEB) 0 5-2 5 mg/3 mL inhalation solution 3 mL (3 mL Nebulization Given 1/12/20 2104)   iohexol (OMNIPAQUE) 350 MG/ML injection (MULTI-DOSE) 100 mL (has no administration in time range)   iohexol (OMNIPAQUE) 350 MG/ML injection (MULTI-DOSE) 85 mL (85 mL Intravenous Given 1/12/20 2138)       Diagnostic Studies  Results Reviewed     Procedure Component Value Units Date/Time    Protime-INR [[id number]]  (Normal) Collected:  01/12/20 2046    Lab Status:  Final result Specimen:  Blood from Arm, Right Updated:  01/12/20 2137     Protime 12 0 seconds      INR 0 88    APTT [[id number]]  (Normal) Collected:  01/12/20 2046    Lab Status:  Final result Specimen:  Blood from Arm, Right Updated:  01/12/20 2137     PTT 26 seconds     Hepatic function panel [[id number]]  (Normal) Collected:  01/12/20 2046    Lab Status:  Final result Specimen:  Blood from Arm, Right Updated:  01/12/20 2131     Total Bilirubin 0 20 mg/dL      Bilirubin, Direct 0 07 mg/dL      Alkaline Phosphatase 60 U/L      AST 9 U/L      ALT 19 U/L      Total Protein 7 9 g/dL      Albumin 4 1 g/dL     NT-BNP PRO [[id number]]  (Normal) Collected:  01/12/20 2046    Lab Status:  Final result Specimen:  Blood from Arm, Right Updated:  01/12/20 2131     NT-proBNP 88 pg/mL     Magnesium [[id number]]  (Normal) Collected:  01/12/20 2046    Lab Status:  Final result Specimen:  Blood from Arm, Right Updated:  01/12/20 2131     Magnesium 2 0 mg/dL     Lactic acid, plasma x2 [[id number]]  (Abnormal) Collected:  01/12/20 2046    Lab Status:  Final result Specimen:  Blood from Arm, Right Updated:  01/12/20 2130     LACTIC ACID 2 1 mmol/L     Narrative:       Result may be elevated if tourniquet was used during collection      Basic metabolic panel [[id number]]  (Abnormal) Collected:  01/12/20 2046    Lab Status:  Final result Specimen:  Blood from Arm, Right Updated:  01/12/20 2123     Sodium 142 mmol/L      Potassium 4 8 mmol/L      Chloride 106 mmol/L      CO2 24 mmol/L      ANION GAP 12 mmol/L      BUN 16 mg/dL      Creatinine 1 12 mg/dL      Glucose 146 mg/dL      Calcium 9 0 mg/dL      eGFR 72 ml/min/1 73sq m     Narrative:       [place] guidelines for Chronic Kidney Disease (CKD):     Stage 1 with normal or high GFR (GFR > 90 mL/min/1 73 square meters)    Stage 2 Mild CKD (GFR = 60-89 mL/min/1 73 square meters)    Stage 3A Moderate CKD (GFR = 45-59 mL/min/1 73 square meters)    Stage 3B Moderate CKD (GFR = 30-44 mL/min/1 73 square meters)    Stage 4 Severe CKD (GFR = 15-29 mL/min/1 73 square meters)    Stage 5 End Stage CKD (GFR <15 mL/min/1 73 square meters)  Note: GFR calculation is accurate only with a steady state creatinine    Troponin I [[id number]]  (Normal) Collected:  01/12/20 2046    Lab Status:  Final result Specimen:  Blood from Arm, Right Updated:  01/12/20 2123     Troponin I <0 02 ng/mL     Blood gas, venous [[id number]]  (Abnormal) Collected:  01/12/20 2046    Lab Status:  Final result Specimen:  Blood from Arm, Right Updated:  01/12/20 2055     pH, [place] 7 390     pCO2, [place] 34 8 mm Hg      pO2, [place] 70 2 mm Hg      HCO3, [place] 20 6 mmol/L      Base Excess, [place] -3 5 mmol/L      O2 Content, [place] 21 7 ml/dL      O2 HGB, VENOUS 93 6 %     CBC and differential [[id number]]  (Abnormal) Collected:  01/12/20 2046    Lab Status:  Final result Specimen:  Blood from Arm, Right Updated:  01/12/20 2053     WBC 7 17 Thousand/uL      RBC 4 92 Million/uL      Hemoglobin 16 2 g/dL      Hematocrit 47 0 %      MCV 96 fL      MCH 32 9 pg      MCHC 34 5 g/dL      RDW 12 4 % MPV 9 1 fL      Platelets 111 Thousands/uL      nRBC 0 /100 WBCs      Neutrophils Relative 87 %      Immat GRANS % 1 %      Lymphocytes Relative 7 %      Monocytes Relative 4 %      Eosinophils Relative 0 %      Basophils Relative 1 %      Neutrophils Absolute 6 28 Thousands/µL      Immature Grans Absolute 0 04 Thousand/uL      Lymphocytes Absolute 0 48 Thousands/µL      Monocytes Absolute 0 29 Thousand/µL      Eosinophils Absolute 0 01 Thousand/µL      Basophils Absolute 0 07 Thousands/µL     Lactic acid, plasma x2 [[id number]]     Lab Status:  No result Specimen:  Blood                  XR chest 2 views   ED Interpretation by Lashell Shaffer MD (01/12 2100)   No acute process  CTA ED chest PE study    (Results Pending)              Procedures  ECG 12 Lead Documentation Only  Date/Time: 1/12/2020 8:57 PM  Performed by: Lashell Shaffer MD  Authorized by: Lashell Shaffer MD     Indications / Diagnosis:  Sob  ECG reviewed by me, the ED Provider: yes    Patient location:  ED  Previous ECG:     Previous ECG:  Compared to current    Comparison ECG info:  6 feb 18    Similarity:  No change  Interpretation:     Interpretation: normal    Rate:     ECG rate:  82    ECG rate assessment: normal    Rhythm:     Rhythm: sinus rhythm    Comments:      No acute ischemic changes  ED Course  ED Course as of Jan 12 2219   Sawyer Fineyale Jan 12, 2020 2105 9:05 PM reevaluated, appears and feels improved as compared to time of initial evaluation  Lying back in bed  No resp distress  Xray reviewed by me  Labs pending   Ekg normal                              Wells' Criteria for PE      Most Recent Value   Wells' Criteria for PE   Clinical signs and symptoms of DVT  0 Filed at: 01/12/2020 2127   PE is primary diagnosis or equally likely  3 Filed at: 01/12/2020 2127   HR >100  0 Filed at: 01/12/2020 2127   Immobilization at least 3 days or Surgery in the previous 4 weeks  0 Filed at: 01/12/2020 2127   Previous, objectively diagnosed PE or DVT  0 Filed at: 01/12/2020 2127   Hemoptysis  0 Filed at: 01/12/2020 2127   Malignancy with treatment within 6 months or palliative  0 Filed at: 01/12/2020 2127   Wells' Criteria Total  3 Filed at: 01/12/2020 2127            MDM      Disposition  Final diagnoses:   Dyspnea   COPD exacerbation ([facility] )   History of cardiomyopathy     Time reflects when diagnosis was documented in both MDM as applicable and the Disposition within this note     Time User Action Codes Description Comment    1/12/2020  9:56 PM [place] [R06 00] Dyspnea     1/12/2020  9:56 PM [place] [J44 1] COPD exacerbation ([facility] )     1/12/2020  9:56 PM [place] [Z86 [id number]] History of cardiomyopathy       ED Disposition     ED Disposition Condition Date/Time Comment    Admit Stable Sun Jan 12, 2020 10:19 PM Case was discussed with Dr Pallavi Wilkinson and the patient's admission status was agreed to be Admission Status: observation status to the service of Dr Pallavi Wilkinson   Follow-up Information    None         Patient's Medications   Discharge Prescriptions    No medications on file     No discharge procedures on file      ED Provider  Electronically Signed by           Kaden Sterling MD  01/12/20 4813

## 2020-01-13 NOTE — PROGRESS NOTES
Progress Note - Reina Buck 1960, 63 y o  male MRN: [id number]    Unit/Bed#: ED 06 Encounter: [id number]    Primary Care Provider: Mitchell Greenwood DO   Date and time admitted to hospital: 1/12/2020  8:25 PM        * Pulmonary emboli [facility])  Assessment & Plan  · Patient presented with SOB  CXR with no acute cardiopulmonary disease  · CTA PE study revealed a filling defects involving anterior subsegmental branches of the right lower lobe suspicious for pulmonary embolism  No evidence of right heart strain documented on CTA read - follow up echo  · Patient was placed on therapeutic Lovenox on admission, 1mg/kg BID  Will consult Case Management for NOAC pricing   · Pulm consulted    Hypertension, essential  Assessment & Plan  · Continue home antihypertensives, Coreg and Losartan    Asthma  Assessment & Plan  · Patient states that he takes ProAir PRN, Anoro, Singulair, and Advair at home  Patient states he is no longer using Spiriva  · Pulm consulted  · [facility]    Tobacco abuse  Assessment & Plan  · Encourage cessation  · Nicotine patch on board      VTE Pharmacologic Prophylaxis:   Pharmacologic: Enoxaparin (Lovenox)  Mechanical VTE Prophylaxis in Place: Yes    Patient Centered Rounds: I have performed bedside rounds with nursing staff today  Discussions with Specialists or Other Care Team Provider: UR and Pulm    Education and Discussions with Family / Patient: patient  When asked if there was any when he would like me to call today with an update, the patient kindly declined  Time Spent for Care: 30 minutes  More than 50% of total time spent on counseling and coordination of care as described above      Current Length of Stay: 0 day(s)    Current Patient Status: Observation   Certification Statement: The patient, admitted on an observation basis, will now require > 2 midnight hospital stay due to PE on CTA on SC Lovenox, Pulm consulted, checking echo    Discharge Plan: pending Pulm consult, echo results, and price check PO AC options     Code Status: Level 1 - Full Code      Subjective:   Mr Alex Estrada reports that he is feeling better but is still short of breath  He does report an episode of chest pain approximately 2 weeks ago while at work  Denies chest pain currently  Objective:     Vitals:   Temp (24hrs), Av 9 °F (36 6 °C), Min:97 9 °F (36 6 °C), Max:97 9 °F (36 6 °C)    Temp:  [97 9 °F (36 6 °C)] 97 9 °F (36 6 °C)  HR:  [76-91] 79  Resp:  [18-24] 22  BP: (136-160)/(76-82) 159/80  SpO2:  [92 %-98 %] 94 %  Body mass index is 23 63 kg/m²  Input and Output Summary (last 24 hours):     No intake or output data in the 24 hours ending 20 1120    Physical Exam:     Physical Exam   Constitutional: He is oriented to person, place, and time  No distress  Patient seen lying in ED bed comfortably resting  Pleasant and cooperative   Cardiovascular: Normal rate and regular rhythm  Pulmonary/Chest: Effort normal and breath sounds normal  No respiratory distress  He has no wheezes  Abdominal: Soft  Bowel sounds are normal  There is no tenderness  Musculoskeletal: He exhibits no edema  Right calf 32 cm in circumference, left calf 32 5 cm in circumference  No calf tenderness bilaterally  Homans sign negative bilaterally  Neurological: He is alert and oriented to person, place, and time  Skin: Skin is warm  He is not diaphoretic  Psychiatric: He has a normal mood and affect  His behavior is normal    Vitals reviewed        Additional Data:     Labs:    Results from last 7 days   Lab Units 20  0421   WBC Thousand/uL 11 62*   HEMOGLOBIN g/dL 15 6   HEMATOCRIT % 45 8   PLATELETS Thousands/uL 335   NEUTROS PCT % 85*   LYMPHS PCT % 8*   MONOS PCT % 5   EOS PCT % 0     Results from last 7 days   Lab Units 20  0421 20  2046   SODIUM mmol/L 138 142   POTASSIUM mmol/L 4 2 4 8   CHLORIDE mmol/L 102 106   CO2 mmol/L 23 24   BUN mg/dL 14 16   CREATININE mg/dL 0 95 1  12   ANION GAP mmol/L 13 12   CALCIUM mg/dL 8 7 9 0   ALBUMIN g/dL  --  4 1   TOTAL BILIRUBIN mg/dL  --  0 20   ALK PHOS U/L  --  60   ALT U/L  --  19   AST U/L  --  9   GLUCOSE RANDOM mg/dL 135 146*     Results from last 7 days   Lab Units 01/12/20  2046   INR  0 88             Results from last 7 days   Lab Units 01/12/20  2235 01/12/20 2046   LACTIC ACID mmol/L 1 9 2 1*           * I Have Reviewed All Lab Data Listed Above  * Additional Pertinent Lab Tests Reviewed: All Labs Within Last 24 Hours Reviewed    Imaging:    Imaging Reports Reviewed Today Include: CXR, CTA chest  Imaging Personally Reviewed by Myself Includes:  None    Recent Cultures (last 7 days):           Last 24 Hours Medication List:     Current Facility-Administered Medications:  acetaminophen 650 mg Oral Q6H PRN Jeannine Rosenberg MD   albuterol 1 puff Inhalation Q4H PRN Jeannine Rosenberg MD   albuterol 2 5 mg Nebulization Q4H PRN Jeannine Rosenberg MD   aspirin 81 mg Oral Daily Jeannine Rosenberg MD   carvedilol 3 125 mg Oral BID With Meals Jeannine Rosenberg MD   enoxaparin 1 mg/kg Subcutaneous Q12H Shaun Mock MD   fluticasone-vilanterol 1 puff Inhalation Daily Jeannine Rosenberg MD   guaiFENesin 600 mg Oral Q12H [address] Jeannine Rosenberg MD   iohexol 100 mL Intravenous Once in imaging Jeannine Rosenberg MD   levalbuterol 1 25 mg Nebulization TID Jeannine Rosenberg MD   losartan 25 mg Oral Daily Jeannine Rosenberg MD   montelukast 10 mg Oral Daily Jeannine Rosenberg MD   nicotine 1 patch Transdermal Daily Jeannine Rosenberg MD   sodium chloride 3 mL Nebulization TID Jeannine Rosenberg MD   tiotropium 18 mcg Inhalation Daily Jeannine Rosenberg MD        Today, Patient Was Seen By: Hans Summers PA-C    ** Please Note: Dictation voice to text software may have been used in the creation of this document   **

## 2020-01-13 NOTE — ASSESSMENT & PLAN NOTE
· Patient presented with SOB  CXR with no acute cardiopulmonary disease  · CTA PE study revealed a filling defects involving anterior subsegmental branches of the right lower lobe suspicious for pulmonary embolism  No evidence of right heart strain documented on CTA read - follow up echo  · Patient was placed on therapeutic Lovenox on admission, 1mg/kg BID   Will consult Case Management for NOAC pricing   · Pulm consulted

## 2020-01-13 NOTE — H&P
H&P- Eliazar Moise 1960, 63 y o  male MRN: [id number]    Unit/Bed#: ED 29 Encounter: [id number]    Primary Care Provider: Sheba Kan DO   Date and time admitted to hospital: 1/12/2020  8:25 PM        * Dyspnea  Assessment & Plan  Cycle cardiac enzymes  Telemetry monitoring  Echocardiogram to evaluate ejection fraction and valvular function-in the past patient with history of ejection fraction 15% most recent echo demonstrated 50%  O2 sat monitoring with supplemental O2 as needed to maintain sat 90% or greater by pulse oximetry  Supportive care      Tobacco abuse  Assessment & Plan  Cessation counseled  Replacement offered    Asthma  Assessment & Plan  Continue home respiratory medication    Hypertension, essential  Assessment & Plan  Continue home antihypertensives  Monitor blood pressure with routine vitals      VTE Prophylaxis: Heparin  / sequential compression device   Code Status:  Full code  POLST: There is no POLST form on file for this patient (pre-hospital)  Discussion with family:  Bedside    Anticipated Length of Stay:  Patient will be admitted on an Observation basis with an anticipated length of stay of  less than 2 midnights  Justification for Hospital Stay:  Dyspnea    Total Time for Visit, including Counseling / Coordination of Care: 30 minutes  Greater than 50% of this total time spent on direct patient counseling and coordination of care  Chief Complaint:   Dyspnea    History of Present Illness:    Eliazar Moise is a 63 y o  male past medical history in this case significant for COPD tobacco abuse and a history of cardiomyopathy with an estimated ejection fraction 15% which with medication for hypertension has improved to 50% by latest echocardiogram presents with a complaint of dyspnea  The patient had a cold approximately 3 days ago with increasing secretions over the past 3 days, today he woke up with a worsened productive cough and shortness of breath    The patient presented to the emergency department for evaluation for shortness of breath  He denies fevers chills denies sick contacts denies abdominal pain chest pain urinary complaints bowel disturbance  Review of Systems:    Review of Systems   Constitutional: Negative  Negative for activity change, appetite change, chills, diaphoresis, fatigue, fever and unexpected weight change  HENT: Positive for postnasal drip and sinus pressure (Maxillary and frontal sinus pressure)  Negative for congestion, dental problem, facial swelling, hearing loss, rhinorrhea, sinus pain, sneezing, sore throat and tinnitus  Eyes: Negative  Negative for photophobia, pain, discharge and visual disturbance  Respiratory: Positive for cough ( productive), shortness of breath and wheezing  Negative for chest tightness and stridor  Cardiovascular: Negative  Negative for chest pain, palpitations and leg swelling  Gastrointestinal: Negative  Negative for abdominal distention, abdominal pain, constipation, diarrhea, nausea and vomiting  Endocrine: Negative  Negative for cold intolerance, heat intolerance and polyuria  Genitourinary: Negative  Negative for difficulty urinating, dysuria, flank pain, frequency, hematuria and urgency  Musculoskeletal: Negative  Negative for arthralgias, gait problem, joint swelling, myalgias and neck stiffness  Skin: Negative  Negative for color change, pallor, rash and wound  Allergic/Immunologic: Negative  Negative for immunocompromised state  Neurological: Negative  Negative for dizziness, seizures, syncope, weakness, light-headedness, numbness and headaches  Hematological: Negative  Negative for adenopathy  Does not bruise/bleed easily  Psychiatric/Behavioral: Negative  Negative for confusion and hallucinations  The patient is not nervous/anxious          Past Medical and Surgical History:     Past Medical History:   Diagnosis Date    Asthma     Cardiomyopathy [facility] [address] [facility]     COPD [facility]ch[facility] obstructive pulmonary disease) [facility] [address] [facility]     Hypertension        History reviewed  No pertinent surgical history  Meds/Allergies:    Prior to Admission medications    Medication Sig Start Date End Date Taking? Authorizing Provider   albuterol (PROAIR HFA) 90 mcg/act inhaler Inhale 1 puff every 4 (four) hours as needed for wheezing or shortness of breath 2/6/18   Nate Ramirez MD   aspirin 81 MG tablet Take 1 tablet by mouth daily 2/19/15   Historical Provider, MD   carvedilol (COREG) 3 125 mg tablet 1 tab twice a day 7/1/19   Maurizio Valdes MD   fluticasone-salmeterol (ADVAIR DISKUS) 250-50 mcg/dose inhaler Inhale    Historical Provider, MD   losartan (COZAAR) 25 mg tablet Take 1 tablet (25 mg total) by mouth daily 5/7/19   Maurizio Valdes MD   montelukast (SINGULAIR) 10 mg tablet Take by mouth    Historical Provider, MD   tiotropium (SPIRIVA HANDIHALER) 18 mcg inhalation capsule Place into inhaler and inhale    Historical Provider, MD   varenicline (CHANTIX) 0 5 mg tablet Take 1 tablet (0 5 mg total) by mouth 2 (two) times a day 2/27/18   Maurizio Valdes MD     I have reviewed home medications using allscripts  Allergies:    Allergies   Allergen Reactions    Naproxen GI Intolerance       Social History:     Marital Status: /Civil Union   Occupation:    Patient Pre-hospital Living Situation:  Independent  Patient Pre-hospital Level of Mobility:  Ambulates without assist device  Patient Pre-hospital Diet Restrictions:  None  Substance Use History:   Social History     Substance and Sexual Activity   Alcohol Use No     Social History     Tobacco Use   Smoking Status Current Every Day Smoker    Packs/day: 0 50    Types: Cigarettes   Smokeless Tobacco Never Used     Social History     Substance and Sexual Activity   Drug Use No       Family History:    non-contributory    Physical Exam:     Vitals:   Blood Pressure: 136/76 (01/12/20 2230)  Pulse: 76 (01/12/20 2230)  Temperature: 97 9 °F (36 6 °C) (01/12/20 2012)  Temp Source: Oral (01/12/20 2012)  Respirations: 19 (01/12/20 2230)  Weight - Scale: 72 6 kg (160 lb) (01/12/20 2013)  SpO2: 97 % (01/12/20 2230)    Physical Exam   Constitutional: He is oriented to person, place, and time  He appears well-developed and well-nourished  No distress  HENT:   Head: Normocephalic and atraumatic  Right Ear: External ear normal    Left Ear: External ear normal    Nose: Nose normal    Eyes: Conjunctivae are normal  Right eye exhibits no discharge  Left eye exhibits no discharge  No scleral icterus  Neck: Normal range of motion  No JVD present  No tracheal deviation present  No thyromegaly present  Cardiovascular: Normal rate, regular rhythm, normal heart sounds and intact distal pulses  Exam reveals no gallop and no friction rub  No murmur heard  Pulmonary/Chest: Effort normal  No stridor  No respiratory distress  He has wheezes  He has no rales  Abdominal: Soft  Bowel sounds are normal  He exhibits no distension  There is no tenderness  There is no rebound and no guarding  Musculoskeletal: Normal range of motion  He exhibits no edema, tenderness or deformity  Neurological: He is alert and oriented to person, place, and time  He has normal reflexes  He exhibits normal muscle tone  Coordination normal    Skin: Skin is warm and dry  No rash noted  He is not diaphoretic  No erythema  No pallor  Psychiatric: He has a normal mood and affect  His behavior is normal  Judgment and thought content normal    Nursing note and vitals reviewed  Additional Data:     Lab Results: I have personally reviewed pertinent reports        Results from last 7 days   Lab Units 01/12/20 2046   WBC Thousand/uL 7 17   HEMOGLOBIN g/dL 16 2   HEMATOCRIT % 47 0   PLATELETS Thousands/uL 347   NEUTROS PCT % 87*   LYMPHS PCT % 7*   MONOS PCT % 4   EOS PCT % 0     Results from last 7 days   Lab Units 01/12/20 2046   SODIUM mmol/L 142   POTASSIUM mmol/L 4 8   CHLORIDE mmol/L 106   CO2 mmol/L 24   BUN mg/dL 16   CREATININE mg/dL 1 12   ANION GAP mmol/L 12   CALCIUM mg/dL 9 0   ALBUMIN g/dL 4 1   TOTAL BILIRUBIN mg/dL 0 20   ALK PHOS U/L 60   ALT U/L 19   AST U/L 9   GLUCOSE RANDOM mg/dL 146*     Results from last 7 days   Lab Units 01/12/20 2046   INR  0 88             Results from last 7 days   Lab Units 01/12/20 2046   LACTIC ACID mmol/L 2 1*       Imaging: I have personally reviewed pertinent reports  XR chest 2 views   ED Interpretation by Tessa Llanos MD (01/12 2100)   No acute process  CTA ED chest PE study    (Results Pending)       EKG, Pathology, and Other Studies Reviewed on Admission:   · EKG:      Allscripts / Epic Records Reviewed: Yes     ** Please Note: This note has been constructed using a voice recognition system   **

## 2020-01-13 NOTE — PLAN OF CARE
Problem: PAIN - ADULT  Goal: Verbalizes/displays adequate comfort level or baseline comfort level  Description  Interventions:  - Encourage patient to monitor pain and request assistance  - Assess pain using appropriate pain scale  - Administer analgesics based on type and severity of pain and evaluate response  - Implement non-pharmacological measures as appropriate and evaluate response  - Consider cultural and social influences on pain and pain management  - Notify physician/advanced practitioner if interventions unsuccessful or patient reports new pain  Outcome: Progressing     Problem: INFECTION - ADULT  Goal: Absence or prevention of progression during hospitalization  Description  INTERVENTIONS:  - Assess and monitor for signs and symptoms of infection  - Monitor lab/diagnostic results  - Monitor all insertion sites, i e  indwelling lines, tubes, and drains  - Monitor endotracheal if appropriate and nasal secretions for changes in amount and color  - [place] appropriate cooling/warming therapies per order  - Administer medications as ordered  - Instruct and encourage patient and family to use good hand hygiene technique  - Identify and instruct in appropriate isolation precautions for identified infection/condition  Outcome: Progressing     Problem: SAFETY ADULT  Goal: Patient will remain free of falls  Description  INTERVENTIONS:  - Assess patient frequently for physical needs  -  Identify cognitive and physical deficits and behaviors that affect risk of falls    -  [place] fall precautions as indicated by assessment   - Educate patient/family on patient safety including physical limitations  - Instruct patient to call for assistance with activity based on assessment  - Modify environment to reduce risk of injury  - Consider OT/PT consult to assist with strengthening/mobility  Outcome: Progressing  Goal: Maintain or return to baseline ADL function  Description  INTERVENTIONS:  -  Assess patient's ability to carry out ADLs; assess patient's baseline for ADL function and identify physical deficits which impact ability to perform ADLs (bathing, care of mouth/teeth, toileting, grooming, dressing, etc )  - Assess/evaluate cause of self-care deficits   - Assess range of motion  - Assess patient's mobility; develop plan if impaired  - Assess patient's need for assistive devices and provide as appropriate  - Encourage maximum independence but intervene and supervise when necessary  - Involve family in performance of ADLs  - Assess for home care needs following discharge   - Consider OT consult to assist with ADL evaluation and planning for discharge  - Provide patient education as appropriate  Outcome: Progressing  Goal: Maintain or return mobility status to optimal level  Description  INTERVENTIONS:  - Assess patient's baseline mobility status (ambulation, transfers, stairs, etc )    - Identify cognitive and physical deficits and behaviors that affect mobility  - Identify mobility aids required to assist with transfers and/or ambulation (gait belt, sit-to-stand, lift, walker, cane, etc )  - [place] fall precautions as indicated by assessment  - Record patient progress and toleration of activity level on Mobility SBAR; progress patient to next Phase/Stage  - Instruct patient to call for assistance with activity based on assessment  - Consider rehabilitation consult to assist with strengthening/weightbearing, etc   Outcome: Progressing     Problem: DISCHARGE PLANNING  Goal: Discharge to home or other facility with appropriate resources  Description  INTERVENTIONS:  - Identify barriers to discharge w/patient and caregiver  - Arrange for needed discharge resources and transportation as appropriate  - Identify discharge learning needs (meds, wound care, etc )  - Arrange for interpretive services to assist at discharge as needed  - Refer to Case Management Department for coordinating discharge planning if the patient needs post-hospital services based on physician/advanced practitioner order or complex needs related to functional status, cognitive ability, or social support system  Outcome: Progressing     Problem: Knowledge Deficit  Goal: Patient/family/caregiver demonstrates understanding of disease process, treatment plan, medications, and discharge instructions  Description  Complete learning assessment and assess knowledge base    Interventions:  - Provide teaching at level of understanding  - Provide teaching via preferred learning methods  Outcome: Progressing     Problem: CARDIOVASCULAR - ADULT  Goal: Maintains optimal cardiac output and hemodynamic stability  Description  INTERVENTIONS:  - Monitor I/O, vital signs and rhythm  - Monitor for S/S and trends of decreased cardiac output  - Administer and titrate ordered vasoactive medications to optimize hemodynamic stability  - Assess quality of pulses, skin color and temperature  - Assess for signs of decreased coronary artery perfusion  - Instruct patient to report change in severity of symptoms  Outcome: Progressing  Goal: Absence of cardiac dysrhythmias or at baseline rhythm  Description  INTERVENTIONS:  - Continuous cardiac monitoring, vital signs, obtain 12 lead EKG if ordered  - Administer antiarrhythmic and heart rate control medications as ordered  - Monitor electrolytes and administer replacement therapy as ordered  Outcome: Progressing     Problem: RESPIRATORY - ADULT  Goal: Achieves optimal ventilation and oxygenation  Description  INTERVENTIONS:  - Assess for changes in respiratory status  - Assess for changes in mentation and behavior  - Position to facilitate oxygenation and minimize respiratory effort  - Oxygen administered by appropriate delivery if ordered  - Initiate smoking cessation education as indicated  - Encourage broncho-pulmonary hygiene including cough, deep breathe, Incentive Spirometry  - Assess the need for suctioning and aspirate as needed  - Assess and instruct to report SOB or any respiratory difficulty  - Respiratory Therapy support as indicated  Outcome: Progressing

## 2020-01-13 NOTE — ASSESSMENT & PLAN NOTE
· Patient states that he takes ProAir PRN, Anoro, Singulair, and Advair at home   Patient states he is no longer using Spiriva  · Pulm consulted  · Follows with [facility] Pulmonology

## 2020-01-14 ENCOUNTER — APPOINTMENT (INPATIENT)
Dept: NON INVASIVE DIAGNOSTICS | Facility: HOSPITAL | Age: 60
DRG: 176 | End: 2020-01-14
Payer: COMMERCIAL

## 2020-01-14 ENCOUNTER — APPOINTMENT (INPATIENT)
Dept: ULTRASOUND IMAGING | Facility: HOSPITAL | Age: 60
DRG: 176 | End: 2020-01-14
Payer: COMMERCIAL

## 2020-01-14 VITALS
TEMPERATURE: 98.1 F | SYSTOLIC BLOOD PRESSURE: 135 MMHG | DIASTOLIC BLOOD PRESSURE: 80 MMHG | HEART RATE: 66 BPM | RESPIRATION RATE: 18 BRPM | OXYGEN SATURATION: 94 % | HEIGHT: 70 IN | BODY MASS INDEX: 22.95 KG/M2 | WEIGHT: 160.27 LBS

## 2020-01-14 LAB
ANION GAP SERPL CALCULATED.3IONS-SCNC: 13 MMOL/L (ref 4–13)
BUN SERPL-MCNC: 22 MG/DL (ref 5–25)
CALCIUM SERPL-MCNC: 8.8 MG/DL (ref 8.3–10.1)
CHLORIDE SERPL-SCNC: 103 MMOL/L (ref 100–108)
CO2 SERPL-SCNC: 22 MMOL/L (ref 21–32)
CREAT SERPL-MCNC: 1.01 MG/DL (ref 0.6–1.3)
ERYTHROCYTE [DISTWIDTH] IN BLOOD BY AUTOMATED COUNT: 12.7 % (ref 11.6–15.1)
GFR SERPL CREATININE-BSD FRML MDRD: 82 ML/MIN/1.73SQ M
GLUCOSE SERPL-MCNC: 94 MG/DL (ref 65–140)
HCT VFR BLD AUTO: 46.3 % (ref 36.5–49.3)
HGB BLD-MCNC: 15.9 G/DL (ref 12–17)
MCH RBC QN AUTO: 33.1 PG (ref 26.8–34.3)
MCHC RBC AUTO-ENTMCNC: 34.3 G/DL (ref 31.4–37.4)
MCV RBC AUTO: 96 FL (ref 82–98)
PLATELET # BLD AUTO: 318 THOUSANDS/UL (ref 149–390)
PMV BLD AUTO: 9.2 FL (ref 8.9–12.7)
POTASSIUM SERPL-SCNC: 3.9 MMOL/L (ref 3.5–5.3)
RBC # BLD AUTO: 4.81 MILLION/UL (ref 3.88–5.62)
SODIUM SERPL-SCNC: 138 MMOL/L (ref 136–145)
WBC # BLD AUTO: 7.51 THOUSAND/UL (ref 4.31–10.16)

## 2020-01-14 PROCEDURE — 94640 AIRWAY INHALATION TREATMENT: CPT

## 2020-01-14 PROCEDURE — 85027 COMPLETE CBC AUTOMATED: CPT | Performed by: PHYSICIAN ASSISTANT

## 2020-01-14 PROCEDURE — 93306 TTE W/DOPPLER COMPLETE: CPT

## 2020-01-14 PROCEDURE — 80048 BASIC METABOLIC PNL TOTAL CA: CPT | Performed by: PHYSICIAN ASSISTANT

## 2020-01-14 PROCEDURE — 93970 EXTREMITY STUDY: CPT | Performed by: SURGERY

## 2020-01-14 PROCEDURE — 93306 TTE W/DOPPLER COMPLETE: CPT | Performed by: INTERNAL MEDICINE

## 2020-01-14 PROCEDURE — 99239 HOSP IP/OBS DSCHRG MGMT >30: CPT | Performed by: NURSE PRACTITIONER

## 2020-01-14 PROCEDURE — 99232 SBSQ HOSP IP/OBS MODERATE 35: CPT | Performed by: PHYSICIAN ASSISTANT

## 2020-01-14 PROCEDURE — 94760 N-INVAS EAR/PLS OXIMETRY 1: CPT

## 2020-01-14 PROCEDURE — 93970 EXTREMITY STUDY: CPT

## 2020-01-14 RX ADMIN — LEVALBUTEROL HYDROCHLORIDE 1.25 MG: 1.25 SOLUTION, CONCENTRATE RESPIRATORY (INHALATION) at 07:29

## 2020-01-14 RX ADMIN — CARVEDILOL 3.12 MG: 3.12 TABLET, FILM COATED ORAL at 10:06

## 2020-01-14 RX ADMIN — LOSARTAN POTASSIUM 25 MG: 25 TABLET, FILM COATED ORAL at 10:06

## 2020-01-14 RX ADMIN — ASPIRIN 81 MG 81 MG: 81 TABLET ORAL at 10:06

## 2020-01-14 RX ADMIN — GUAIFENESIN 600 MG: 600 TABLET ORAL at 10:06

## 2020-01-14 RX ADMIN — FLUTICASONE FUROATE AND VILANTEROL TRIFENATATE 1 PUFF: 100; 25 POWDER RESPIRATORY (INHALATION) at 10:13

## 2020-01-14 RX ADMIN — TIOTROPIUM BROMIDE 18 MCG: 18 CAPSULE ORAL; RESPIRATORY (INHALATION) at 10:13

## 2020-01-14 RX ADMIN — ISODIUM CHLORIDE 3 ML: 0.03 SOLUTION RESPIRATORY (INHALATION) at 07:29

## 2020-01-14 RX ADMIN — MONTELUKAST SODIUM 10 MG: 10 TABLET, FILM COATED ORAL at 10:06

## 2020-01-14 RX ADMIN — NICOTINE 1 PATCH: 14 PATCH TRANSDERMAL at 10:07

## 2020-01-14 RX ADMIN — ENOXAPARIN SODIUM 70 MG: 80 INJECTION SUBCUTANEOUS at 10:05

## 2020-01-14 NOTE — ASSESSMENT & PLAN NOTE
· Patient states that he takes ProAir PRN, Anoro, Singulair, and Advair at home   Patient states he is no longer using Spiriva  · Pulmonary consulted see recommendations and primary plan  · Follows with [facility] Pul[facility]

## 2020-01-14 NOTE — DISCHARGE SUMMARY
Discharge- Oliver Castaneda 1960, 63 y o  male MRN: [id number]    Unit/Bed#: -01 Encounter: [id number]    Primary Care Provider: Mohsen Pollard DO   Date and time admitted to hospital: 1/12/2020  8:25 PM    * Pulmonary emboli [facility])  Assessment & Plan  · Patient presented with SOB  · CXR with no acute cardiopulmonary disease  · CTA PE study revealed a filling defects involving anterior subsegmental branches of the right lower lobe suspicious for pulmonary embolism  No evidence of right heart strain documented on CTA read - follow up echo  · Patient was placed on therapeutic Lovenox on admission, 1mg/kg BID  Will transition to Eliquis at discharge  · Pulmonary consulted recommendations as noted below  · Not hypoxic or requiring any supplemental oxygen  · Emphysema also appreciated on imaging  · Started on Lovenox the emergency department  · NOAC pricing obtained; will be 10 dollars monthly  · Dopplers negative for DVT  · Will require at least 3 months of anticoagulation; will also need to be re-evaluated at that time with thrombosis panel to assist with determination of anticoagulation timeline  · Follows outpatient with Violeta Briggs as an outpatient, continue Breo Spiriva Singulair Xopenex Mucinex  · Smoking cessation  · ECHO completed; no evidence of R heart strain    Asthma  Assessment & Plan  · Patient states that he takes ProAir PRN, Anoro, Singulair, and Advair at home   Patient states he is no longer using Spiriva  · Pulmonary consulted see recommendations and primary plan  · Follows with [facility] Pulmonology    Hypertension, essential  Assessment & Plan  · Continue home antihypertensives, Coreg and Losartan    Tobacco abuse  Assessment & Plan  · Encourage cessation  · Nicotine patch on board    Discharging Physician / Practitioner: Jacy Gil  PCP: Mohsen Pollard DO  Admission Date:   Admission Orders (From admission, onward)     Ordered        01/13/20 1123  Inpatient Admission  Once 01/12/20 2218  Place in Observation (expected length of stay for this patient is less than two midnights)  Once                   Discharge Date: 01/14/20    Resolved Problems  Date Reviewed: 1/13/2020    None          Consultations During Hospital Stay:  · Pulmonary    Procedures Performed:   · CT  · Echo  · Duplex study    Significant Findings / Test Results:   · As noted above    Incidental Findings:   · None     Test Results Pending at Discharge (will require follow up): · None     Outpatient Tests Requested:  · Thrombosis panel at outpatient pulmonology visit  Complications:  None    Reason for Admission:  Pulmonary emboli    Hospital Course:     Pilar Apodaca is a 63 y o  male patient with past medical history of asthma, COPD, cardiomyopathy, smoking, and hypertension who originally presented to the hospital on 1/12/2020 due to increased shortness of breath  Patient has reported ejection fraction approximately 50% with medication for hypertension has improved to 50% by last echocardiogram   Patient reports that he has had a cold for approximately 3 days with increased secretions over the past 3 days  He woke up with worsened productive cough and shortness of breath  He denies any fever, chills, denies any sick contacts, abdominal pain, chest pain, urinary symptoms, or bowel disturbance  At the time of discharge she is ambulating appropriately without any shortness of breath or increased work of breathing  CT scan on admission revealed pulmonary embolism  Pulmonology following  Will be discharged on minimum of 3 months of Eliquis anticoagulation  Discussed with case management and is affordable at discharge  He will follow up outpatient pulmonology for low thrombosis panel and anticoagulation timeline  Please see above list of diagnoses and related plan for additional information       Condition at Discharge: stable     Discharge Day Visit / Exam:     Subjective:  Denies any shortness of breath, chest pain, arm pain  Vitals: Blood Pressure: 135/80 (01/14/20 0700)  Pulse: 66 (01/14/20 0700)  Temperature: 98 1 °F (36 7 °C) (01/14/20 0700)  Temp Source: Oral (01/14/20 0700)  Respirations: 18 (01/14/20 0700)  Height: 5' 10" (177 8 cm) (01/13/20 1425)  Weight - Scale: 72 7 kg (160 lb 4 4 oz) (01/13/20 1425)  SpO2: 94 % (01/14/20 1001)  Exam:   Physical Exam   Constitutional: He is oriented to person, place, and time  He appears well-nourished  He is cooperative  Non-toxic appearance  He does not appear ill  No distress  Cardiovascular: Normal rate, regular rhythm, normal heart sounds and intact distal pulses  No murmur heard  Pulses:       Radial pulses are 2+ on the right side, and 2+ on the left side  No peripheral edema noted  Pulmonary/Chest: Effort normal and breath sounds normal  No accessory muscle usage  No tachypnea  No respiratory distress  He has no decreased breath sounds  He has no wheezes  Abdominal: Soft  Bowel sounds are normal  He exhibits no distension  There is no tenderness  Neurological: He is alert and oriented to person, place, and time  Skin: Skin is warm and dry  Capillary refill takes less than 2 seconds  He is not diaphoretic  Psychiatric: He has a normal mood and affect  His speech is normal and behavior is normal  Judgment normal    Vitals reviewed  Discharge instructions/Information to patient and family:   See after visit summary for information provided to patient and family  Provisions for Follow-Up Care:  See after visit summary for information related to follow-up care and any pertinent home health orders  Disposition:     Home     Discharge Statement:  I spent 45 minutes discharging the patient  This time was spent on the day of discharge  I had direct contact with the patient on the day of discharge   Greater than 50% of the total time was spent examining patient, answering all patient questions, arranging and discussing plan of care with patient as well as directly providing post-discharge instructions  Additional time then spent on discharge activities  Discharge Medications:  See after visit summary for reconciled discharge medications provided to patient and family        ** Please Note: This note has been constructed using a voice recognition system **

## 2020-01-14 NOTE — PROGRESS NOTES
Progress Note - Pulmonary   Nino Lesches 63 y o  male MRN: [id number]  Unit/Bed#: -01 Encounter: [id number]    Assessment:  Acute pulmonary embolism, unprovoked  Asthma/COPD overlap  Tobacco abuse    Plan:  Patient is not hypoxic or requiring any supplemental oxygen  CTA with filling defects involving anterior subsegmental branches of right lower lobe suspicious for pulmonary embolism  Emphysema also seen  Patient has been started on Lovenox  Case management consulted to determine NOAC pricing  Patient will be going home on Eliquis  Follow-up on echo to determine if there is any R heart strain  Dopplers negative for DVT  He will need at least 3 months of anticoagulation, will need to re-evaluated at that time with a thrombosis panel once of the anticoagulation to determine long-term needs  Follows with Dr Brandt Jenkins as an outpatient, continue Breo, Spiriva, Singulair, Xopenex, Mucinex  Nicotine replacement therapy, smoking cessation discussed     He will need outpatient pulmonary follow-up with Dr Michoacano Dorantes from a pulmonary standpoint, discussed case in detail with SLIM and patient today    Subjective:   Feeling better  Breathing is good  Objective:     Vitals: Blood pressure 135/80, pulse 66, temperature 98 1 °F (36 7 °C), temperature source Oral, resp  rate 18, height 5' 10" (1 778 m), weight 72 7 kg (160 lb 4 4 oz), SpO2 95 %  ,Body mass index is 23 kg/m²  Intake/Output Summary (Last 24 hours) at 1/14/2020 0955  Last data filed at 1/14/2020 0558  Gross per 24 hour   Intake 960 ml   Output 0 ml   Net 960 ml       Invasive Devices     Peripheral Intravenous Line            Peripheral IV 01/12/20 Right Antecubital 1 day                Physical Exam:  General appearance: alert and oriented, in no acute distress  Head: Normocephalic, without obvious abnormality, atraumatic  Eyes: EOMI  No discharge bilaterally  No scleral icterus     Neck: supple, symmetrical, trachea midline  Lungs: Lungs are clear  Improved exam from yesterday  Heart: regular rate and rhythm, S1, S2 normal, no murmur, click, rub or gallop  Abdomen: no guarding or distention  No tenderness  Extremities: No edema or tenderness appreciated  Skin: No lesions or pallor noted  No rash  Neurologic: Grossly normal     Labs: I have personally reviewed pertinent lab results  Imaging and other studies: I have personally reviewed pertinent reports

## 2020-01-14 NOTE — PLAN OF CARE
Problem: PAIN - ADULT  Goal: Verbalizes/displays adequate comfort level or baseline comfort level  Description  Interventions:  - Encourage patient to monitor pain and request assistance  - Assess pain using appropriate pain scale  - Administer analgesics based on type and severity of pain and evaluate response  - Implement non-pharmacological measures as appropriate and evaluate response  - Consider cultural and social influences on pain and pain management  - Notify physician/advanced practitioner if interventions unsuccessful or patient reports new pain  Outcome: Progressing     Problem: INFECTION - ADULT  Goal: Absence or prevention of progression during hospitalization  Description  INTERVENTIONS:  - Assess and monitor for signs and symptoms of infection  - Monitor lab/diagnostic results  - Monitor all insertion sites, i e  indwelling lines, tubes, and drains  - Monitor endotracheal if appropriate and nasal secretions for changes in amount and color  - [place] appropriate cooling/warming therapies per order  - Administer medications as ordered  - Instruct and encourage patient and family to use good hand hygiene technique  - Identify and instruct in appropriate isolation precautions for identified infection/condition  Outcome: Progressing     Problem: SAFETY ADULT  Goal: Patient will remain free of falls  Description  INTERVENTIONS:  - Assess patient frequently for physical needs  -  Identify cognitive and physical deficits and behaviors that affect risk of falls    -  [place] fall precautions as indicated by assessment   - Educate patient/family on patient safety including physical limitations  - Instruct patient to call for assistance with activity based on assessment  - Modify environment to reduce risk of injury  - Consider OT/PT consult to assist with strengthening/mobility  Outcome: Progressing  Goal: Maintain or return to baseline ADL function  Description  INTERVENTIONS:  -  Assess patient's ability to carry out ADLs; assess patient's baseline for ADL function and identify physical deficits which impact ability to perform ADLs (bathing, care of mouth/teeth, toileting, grooming, dressing, etc )  - Assess/evaluate cause of self-care deficits   - Assess range of motion  - Assess patient's mobility; develop plan if impaired  - Assess patient's need for assistive devices and provide as appropriate  - Encourage maximum independence but intervene and supervise when necessary  - Involve family in performance of ADLs  - Assess for home care needs following discharge   - Consider OT consult to assist with ADL evaluation and planning for discharge  - Provide patient education as appropriate  Outcome: Progressing  Goal: Maintain or return mobility status to optimal level  Description  INTERVENTIONS:  - Assess patient's baseline mobility status (ambulation, transfers, stairs, etc )    - Identify cognitive and physical deficits and behaviors that affect mobility  - Identify mobility aids required to assist with transfers and/or ambulation (gait belt, sit-to-stand, lift, walker, cane, etc )  - [place] fall precautions as indicated by assessment  - Record patient progress and toleration of activity level on Mobility SBAR; progress patient to next Phase/Stage  - Instruct patient to call for assistance with activity based on assessment  - Consider rehabilitation consult to assist with strengthening/weightbearing, etc   Outcome: Progressing     Problem: DISCHARGE PLANNING  Goal: Discharge to home or other facility with appropriate resources  Description  INTERVENTIONS:  - Identify barriers to discharge w/patient and caregiver  - Arrange for needed discharge resources and transportation as appropriate  - Identify discharge learning needs (meds, wound care, etc )  - Arrange for interpretive services to assist at discharge as needed  - Refer to Case Management Department for coordinating discharge planning if the patient needs post-hospital services based on physician/advanced practitioner order or complex needs related to functional status, cognitive ability, or social support system  Outcome: Progressing     Problem: Knowledge Deficit  Goal: Patient/family/caregiver demonstrates understanding of disease process, treatment plan, medications, and discharge instructions  Description  Complete learning assessment and assess knowledge base    Interventions:  - Provide teaching at level of understanding  - Provide teaching via preferred learning methods  Outcome: Progressing     Problem: CARDIOVASCULAR - ADULT  Goal: Maintains optimal cardiac output and hemodynamic stability  Description  INTERVENTIONS:  - Monitor I/O, vital signs and rhythm  - Monitor for S/S and trends of decreased cardiac output  - Administer and titrate ordered vasoactive medications to optimize hemodynamic stability  - Assess quality of pulses, skin color and temperature  - Assess for signs of decreased coronary artery perfusion  - Instruct patient to report change in severity of symptoms  Outcome: Progressing  Goal: Absence of cardiac dysrhythmias or at baseline rhythm  Description  INTERVENTIONS:  - Continuous cardiac monitoring, vital signs, obtain 12 lead EKG if ordered  - Administer antiarrhythmic and heart rate control medications as ordered  - Monitor electrolytes and administer replacement therapy as ordered  Outcome: Progressing     Problem: RESPIRATORY - ADULT  Goal: Achieves optimal ventilation and oxygenation  Description  INTERVENTIONS:  - Assess for changes in respiratory status  - Assess for changes in mentation and behavior  - Position to facilitate oxygenation and minimize respiratory effort  - Oxygen administered by appropriate delivery if ordered  - Initiate smoking cessation education as indicated  - Encourage broncho-pulmonary hygiene including cough, deep breathe, Incentive Spirometry  - Assess the need for suctioning and aspirate as needed  - Assess and instruct to report SOB or any respiratory difficulty  - Respiratory Therapy support as indicated  Outcome: Progressing     Problem: Potential for Falls  Goal: Patient will remain free of falls  Description  INTERVENTIONS:  - Assess patient frequently for physical needs  -  Identify cognitive and physical deficits and behaviors that affect risk of falls    -  [place] fall precautions as indicated by assessment   - Educate patient/family on patient safety including physical limitations  - Instruct patient to call for assistance with activity based on assessment  - Modify environment to reduce risk of injury  - Consider OT/PT consult to assist with strengthening/mobility  Outcome: Progressing

## 2020-01-14 NOTE — ASSESSMENT & PLAN NOTE
· Patient presented with SOB  · CXR with no acute cardiopulmonary disease  · CTA PE study revealed a filling defects involving anterior subsegmental branches of the right lower lobe suspicious for pulmonary embolism  No evidence of right heart strain documented on CTA read - follow up echo  · Patient was placed on therapeutic Lovenox on admission, 1mg/kg BID    Will transition to Eliquis at discharge  · Pulmonary consulted recommendations as noted below  · Not hypoxic or requiring any supplemental oxygen  · Emphysema also appreciated on imaging  · Started on Lovenox the emergency department  · [facility] pricing obtained; will be 10 dollars monthly  · Dopplers negative for DVT  · Will require at least 3 months of anticoagulation; will also need to be re-evaluated at that time with thrombosis panel to assist with determination of anticoagulation timeline  · Follows outpatient with Yobany Patel as an outpatient, continue Breo Spiriva Singulair Xopenex Mucinex  · Smoking cessation  · ECHO completed; no evidence of R heart strain

## 2020-01-14 NOTE — DISCHARGE INSTRUCTIONS
Apixaban (By mouth)   Apixaban (a-PIX-a-ban)  Treats and prevents blood clots  This medicine is a blood thinner  Brand Name(s): Eliquis   There may be other brand names for this medicine  When This Medicine Should Not Be Used: This medicine is not right for everyone  Do not use it if you had an allergic reaction to apixaban or you have active bleeding  How to Use This Medicine:   Tablet  · Your doctor will tell you how much medicine to use  Do not use more than directed  · If you are not able to swallow the tablets whole, they may be crushed and mixed in water, 5% dextrose in water (D5W), apple juice, or applesauce  The crushed tablets may be mixed with 60 mL of water or D5W dose and given through a nasogastric tube (NGT)  · This medicine should come with a Medication Guide  Ask your pharmacist for a copy if you do not have one  · Missed dose: Take a dose as soon as you remember  If it is almost time for your next dose, wait until then and take a regular dose  Do not take extra medicine to make up for a missed dose  · Store the medicine in a closed container at room temperature, away from heat, moisture, and direct light  Drugs and Foods to Avoid:   Ask your doctor or pharmacist before using any other medicine, including over-the-counter medicines, vitamins, and herbal products  · Some medicines can affect how apixaban works  Tell your doctor if you are using any of the following:   ¨ Carbamazepine, clarithromycin, itraconazole, ketoconazole, phenytoin, rifampin, ritonavir, Lizzette's wort  ¨ Blood thinner (including clopidogrel, heparin, prasugrel, warfarin)  ¨ Medicine to treat depression  ¨ NSAID pain or arthritis medicine (including aspirin, celecoxib, diclofenac, ibuprofen, naproxen)  Warnings While Using This Medicine:   · Tell your doctor if you are pregnant or breastfeeding, or if you have kidney disease, liver disease, bleeding problems, or an artificial heart valve    · Do not stop using this medicine suddenly without asking your doctor  You might have a higher risk of stroke for a short time after you stop using this medicine  · This medicine increases your risk for bleeding that can become serious if not controlled  You may also bruise easily, and it may take longer than usual for bleeding to stop  · This medicine may increase your risk for blood clots in your spine or back if you undergo an epidural or spinal puncture  This could lead to paralysis  Tell your doctor if you ever had spine problems or back surgery  · Tell any doctor or dentist who treats you that you are using this medicine  With your doctor's supervision, you may need to stop using this medicine several days before you have surgery or medical tests  · Your doctor will do lab tests at regular visits to check on the effects of this medicine  Keep all appointments  · Keep all medicine out of the reach of children  Never share your medicine with anyone  Possible Side Effects While Using This Medicine:   Call your doctor right away if you notice any of these side effects:  · Allergic reaction: Itching or hives, swelling in your face or hands, swelling or tingling in your mouth or throat, chest tightness, trouble breathing  · Change in how much or how often you urinate, red or pink urine  · Chest pain, trouble breathing  · Coughing up blood, vomiting blood or material that looks like coffee grounds  · Numbness, tingling, or muscle weakness in your legs or feet  · Red or black, tarry stools  · Unusual bleeding, bruising, or weakness  If you notice other side effects that you think are caused by this medicine, tell your doctor  Call your doctor for medical advice about side effects  You may report side effects to FDA at 7-792-TAV-8302  © 2017 [facility] Information is for End User's use only and may not be sold, redistributed or otherwise used for commercial purposes    The above information is an educational aid only  It is not intended as medical advice for individual conditions or treatments  Talk to your doctor, nurse or pharmacist before following any medical regimen to see if it is safe and effective for you  Pulmonary Embolism   WHAT YOU NEED TO KNOW:   A pulmonary embolism (PE) is the sudden blockage of a blood vessel in the lungs by an embolus  An embolus is a small piece of blood clot, fat, air, or tumor cells  The embolus cuts off the blood supply to your lungs  A pulmonary embolism can become life-threatening  DISCHARGE INSTRUCTIONS:   Call 911 for any of the following:   · You feel lightheaded, short of breath, and have chest pain  · You cough up blood  · You have a seizure  · You have slurred speech, increased sleepiness, or problems seeing, talking, or thinking  · You have weakness or cannot move your arm or leg on one side of your body  Seek care immediately if:   · You feel faint  · You have a severe headache  · Your heart is beating faster than normal   Contact your healthcare provider if:   · The skin on any part of your legs or hips turns purple  · Your gums or nose bleed  · You see blood in your urine or bowel movements  · Your bowel movements are black or darker than normal     · You have questions or concerns about your condition or care  Medicines:   · Blood thinners  help treat the PE and prevent new clots from forming  Examples of blood thinners include heparin, rivaroxaban, apixiban, and warfarin  The following are general safety guidelines to follow while you are taking a blood thinner:     ¨ Watch for bleeding and bruising  Watch for bleeding from your gums or nose  Watch for blood in your urine and bowel movements  Use a soft washcloth on your skin, and a soft toothbrush to brush your teeth  This can keep your skin and gums from bleeding  If you shave, use an electric shaver  Do not play contact sports       ¨ Tell your dentist and other healthcare providers that you take a blood thinner  Wear a bracelet or necklace that says you take this medicine  ¨ Do not start or stop any medicines unless your healthcare provider tells you to  Many medicines cannot be used with blood thinners  ¨ Tell your healthcare provider right away if you forget to take the blood thinner , or if you take too much  ¨ Warfarin  is a blood thinner that you may need to take  The following are additional things you should be aware of if you take warfarin:    § Foods and medicines can affect the amount of warfarin in your blood  Do not make major changes to your diet  Warfarin works best when you eat about the same amount of vitamin K every day  Vitamin K is found in green leafy vegetables and certain other foods  Ask for more information about what to eat or not to eat  § You will need to see your healthcare provider for follow-up visits  You will need regular blood tests to decide how much warfarin you need  · Take your medicine as directed  Contact your healthcare provider if you think your medicine is not helping or if you have side effects  Tell him or her if you are allergic to any medicine  Keep a list of the medicines, vitamins, and herbs you take  Include the amounts, and when and why you take them  Bring the list or the pill bottles to follow-up visits  Carry your medicine list with you in case of an emergency  Prevent another PE:   · Wear pressure stockings  The stockings are tight and put pressure on your legs  This improves blood flow and helps prevent clots  Wear the stockings during the day  Do not wear them when you sleep  · Exercise regularly  Ask about the best exercise plan for you  When you travel by car or work at a desk, take breaks to stand up and move around as much as possible  Rotate your feet in circles often if you sit for a long period of time  · Maintain a healthy weight  Ask your healthcare provider how much you should weigh  Ask him to help you create a weight loss plan if you are overweight  · Do not smoke  Nicotine and other chemicals in cigarettes and cigars can damage blood vessels and increase your risk for another PE  Ask your healthcare provider for information if you currently smoke and need help to quit  E-cigarettes or smokeless tobacco still contain nicotine  Talk to your healthcare provider before you use these products  Follow up with your healthcare provider as directed:  Write down your questions so you remember to ask them during your visits  © 2017 [address] Information is for End User's use only and may not be sold, redistributed or otherwise used for commercial purposes  All illustrations and images included in CareNotes® are the copyrighted property of A D A M , Inc  or Zeb Maldonado  The above information is an  only  It is not intended as medical advice for individual conditions or treatments  Talk to your doctor, nurse or pharmacist before following any medical regimen to see if it is safe and effective for you  Cigarette Smoking and Your Health   WHAT YOU NEED TO KNOW:   What are the risks to my health if I smoke tobacco?  Nicotine and other chemicals found in tobacco damage every cell in your body  Even if you are a light smoker, you have an increased risk for cancer, heart disease, and lung disease  If you are pregnant or have diabetes, smoking increases your risk for complications  What are the benefits to my health if I stop smoking? · You decrease respiratory symptoms such as coughing, wheezing, and shortness of breath  · You reduce your risk for cancers of the lung, mouth, throat, kidney, bladder, pancreas, stomach, and cervix  If you already have cancer, you increase the benefits of chemotherapy  You also reduce your risk for cancer returning or a second cancer from developing       · You reduce your risk for heart disease, blood clots, heart attack, and stroke  · You reduce your risk for lung infections, and diseases such as pneumonia, asthma, chronic bronchitis, and emphysema  · Your circulation improves  More oxygen can be delivered to your body  If you have diabetes, you lower your risk for complications, such as kidney, artery, and eye diseases  You also lower your risk for nerve damage  Nerve damage can lead to amputations, poor vision, and blindness  · You improve your body's ability to heal and to fight infections  What are the health benefits to others if I stop smoking? Tobacco is harmful to nonsmokers who breathe in your secondhand smoke  The following are ways the health of others around you may improve when you stop smoking:  · You lower the risks for lung cancer and heart disease in nonsmoking adults  · If you are pregnant, you lower the risk for miscarriage, early delivery, low birth weight, and stillbirth  You also lower your baby's risk for SIDS, obesity, developmental delay, and neurobehavioral problems, such as ADHD  · If you have children, you lower their risk for ear infections, colds, pneumonia, bronchitis, and asthma  Where can I find more information and support to stop smoking? · [facility]  Phone: 7- 532 - [phone]  Web Address: www [employer]  CARE AGREEMENT:   You have the right to help plan your care  Learn about your health condition and how it may be treated  Discuss treatment options with your caregivers to decide what care you want to receive  You always have the right to refuse treatment  The above information is an  only  It is not intended as medical advice for individual conditions or treatments  Talk to your doctor, nurse or pharmacist before following any medical regimen to see if it is safe and effective for you  © 2017 [employer] [place] [employer] Information is for End User's use only and may not be sold, redistributed or otherwise used for commercial purposes  All illustrations and images included in CareNotes® are the copyrighted property of A D A M , Inc  or Zeb Maldonado

## 2020-01-15 NOTE — UTILIZATION REVIEW
Notification of Discharge  This is a Notification of Discharge from our facility [address]  Please be advised that this patient has been discharge from our facility  Below you will find the admission and discharge date and time including the patients disposition  PRESENTATION DATE: 1/12/2020  8:25 PM  OBS ADMISSION DATE: [id number]  IP ADMISSION DATE: 1/13/20 1123   DISCHARGE DATE: 1/14/2020  2:35 PM  DISPOSITION: Home/Self Care Home/Self Care   Admission Orders listed below:  Admission Orders (From admission, onward)     Ordered        01/13/20 1123  Inpatient Admission  Once         01/12/20 2218  Place in Observation (expected length of stay for this patient is less than two midnights)  Once                   Please contact the UR Department if additional information is required to close this patient's authorization/case  [facility] Utilization Review Department  Main: [phone] x carefully listen to the prompts  All voicemails are confidential   [email or web address]  Send all requests for admission clinical reviews, approved or denied determinations and any other requests to dedicated fax number below belonging to the campus where the patient is receiving treatment   List of dedicated fax numbers:  [address] DENIALS (Administrative/Medical Necessity) [phone]   [address]  (Maternity/NICU/Pediatrics) [phone]   Emelia Carbajal [phone]   Nesha Shetty [phone]   Qi Long [phone]   [facility] [place] [address] [place] [address] [phone]   Mel Brewer [phone] [address] [facility] [address] [facility]  [address] [facility] [address] [facility] [phone]

## 2020-05-11 DIAGNOSIS — I10 ESSENTIAL HYPERTENSION: ICD-10-CM

## 2020-05-11 DIAGNOSIS — I10 HYPERTENSION, ESSENTIAL: ICD-10-CM

## 2020-05-11 NOTE — TELEPHONE ENCOUNTER
Patient was last seen 2/2018  No appointments scheduled   Please call patient to schedule a follow-up appointment

## 2020-05-12 DIAGNOSIS — I10 ESSENTIAL HYPERTENSION: ICD-10-CM

## 2020-05-12 RX ORDER — LOSARTAN POTASSIUM 25 MG/1
TABLET ORAL
Qty: 90 TABLET | Refills: 0 | Status: SHIPPED | OUTPATIENT
Start: 2020-05-12 | End: 2020-08-06

## 2020-07-07 DIAGNOSIS — I10 HYPERTENSION, ESSENTIAL: ICD-10-CM

## 2020-07-07 RX ORDER — CARVEDILOL 3.12 MG/1
TABLET ORAL
Qty: 180 TABLET | Refills: 0 | Status: SHIPPED | OUTPATIENT
Start: 2020-07-07 | End: 2020-10-05

## 2020-08-06 DIAGNOSIS — I10 ESSENTIAL HYPERTENSION: ICD-10-CM

## 2020-08-06 RX ORDER — LOSARTAN POTASSIUM 25 MG/1
TABLET ORAL
Qty: 90 TABLET | Refills: 0 | Status: SHIPPED | OUTPATIENT
Start: 2020-08-06 | End: 2020-11-04

## 2020-10-05 DIAGNOSIS — I10 HYPERTENSION, ESSENTIAL: ICD-10-CM

## 2020-10-05 RX ORDER — CARVEDILOL 3.12 MG/1
TABLET ORAL
Qty: 180 TABLET | Refills: 0 | Status: SHIPPED | OUTPATIENT
Start: 2020-10-05 | End: 2021-01-03

## 2020-11-04 DIAGNOSIS — I10 ESSENTIAL HYPERTENSION: ICD-10-CM

## 2020-11-04 RX ORDER — LOSARTAN POTASSIUM 25 MG/1
TABLET ORAL
Qty: 30 TABLET | Refills: 0 | Status: SHIPPED | OUTPATIENT
Start: 2020-11-04 | End: 2020-12-28 | Stop reason: SDUPTHER

## 2020-12-21 DIAGNOSIS — I10 ESSENTIAL HYPERTENSION: ICD-10-CM

## 2020-12-21 RX ORDER — LOSARTAN POTASSIUM 25 MG/1
25 TABLET ORAL DAILY
Qty: 30 TABLET | Refills: 0 | Status: CANCELLED | OUTPATIENT
Start: 2020-12-21

## 2020-12-21 NOTE — TELEPHONE ENCOUNTER
Please send refill for Losartan 25 mg for a 90 day supply to [facility] pharmacy on file

## 2020-12-21 NOTE — TELEPHONE ENCOUNTER
Dr Davon Marquez patient   Last seen 2/2018  Patient needs an appointment, please call to schedule   Thanks

## 2020-12-28 DIAGNOSIS — I10 ESSENTIAL HYPERTENSION: ICD-10-CM

## 2020-12-28 RX ORDER — LOSARTAN POTASSIUM 25 MG/1
25 TABLET ORAL DAILY
Qty: 30 TABLET | Refills: 0 | Status: SHIPPED | OUTPATIENT
Start: 2020-12-28 | End: 2021-01-23

## 2021-01-03 DIAGNOSIS — I10 HYPERTENSION, ESSENTIAL: ICD-10-CM

## 2021-01-03 RX ORDER — CARVEDILOL 3.12 MG/1
TABLET ORAL
Qty: 180 TABLET | Refills: 0 | Status: SHIPPED | OUTPATIENT
Start: 2021-01-03 | End: 2021-03-02 | Stop reason: SDUPTHER

## 2021-01-23 DIAGNOSIS — I10 ESSENTIAL HYPERTENSION: ICD-10-CM

## 2021-01-23 RX ORDER — LOSARTAN POTASSIUM 25 MG/1
TABLET ORAL
Qty: 30 TABLET | Refills: 0 | Status: SHIPPED | OUTPATIENT
Start: 2021-01-23 | End: 2021-01-25 | Stop reason: SDUPTHER

## 2021-01-25 DIAGNOSIS — I10 ESSENTIAL HYPERTENSION: ICD-10-CM

## 2021-01-25 RX ORDER — LOSARTAN POTASSIUM 25 MG/1
25 TABLET ORAL DAILY
Qty: 90 TABLET | Refills: 0 | Status: SHIPPED | OUTPATIENT
Start: 2021-01-25 | End: 2021-02-20

## 2021-01-25 NOTE — TELEPHONE ENCOUNTER
Pt called requesting refill for losartan (COZAAR) 25 mg tablet for a 90 supply  Please send refill to [facility] on file

## 2021-02-20 DIAGNOSIS — I10 ESSENTIAL HYPERTENSION: ICD-10-CM

## 2021-02-20 RX ORDER — LOSARTAN POTASSIUM 25 MG/1
TABLET ORAL
Qty: 30 TABLET | Refills: 0 | Status: SHIPPED | OUTPATIENT
Start: 2021-02-20 | End: 2021-03-02 | Stop reason: SDUPTHER

## 2021-03-02 ENCOUNTER — OFFICE VISIT (OUTPATIENT)
Dept: CARDIOLOGY CLINIC | Facility: CLINIC | Age: 61
End: 2021-03-02
Payer: COMMERCIAL

## 2021-03-02 VITALS
HEART RATE: 72 BPM | OXYGEN SATURATION: 98 % | HEIGHT: 69 IN | SYSTOLIC BLOOD PRESSURE: 144 MMHG | BODY MASS INDEX: 24.88 KG/M2 | DIASTOLIC BLOOD PRESSURE: 80 MMHG | WEIGHT: 168 LBS

## 2021-03-02 DIAGNOSIS — E78.2 COMBINED HYPERLIPIDEMIA: ICD-10-CM

## 2021-03-02 DIAGNOSIS — I10 HYPERTENSION, ESSENTIAL: ICD-10-CM

## 2021-03-02 DIAGNOSIS — R06.00 DYSPNEA: ICD-10-CM

## 2021-03-02 DIAGNOSIS — Z79.01 CHRONIC ANTICOAGULATION: Primary | ICD-10-CM

## 2021-03-02 DIAGNOSIS — I10 ESSENTIAL HYPERTENSION: ICD-10-CM

## 2021-03-02 PROCEDURE — 99214 OFFICE O/P EST MOD 30 MIN: CPT | Performed by: INTERNAL MEDICINE

## 2021-03-02 RX ORDER — LOSARTAN POTASSIUM 25 MG/1
25 TABLET ORAL DAILY
Qty: 90 TABLET | Refills: 3 | Status: SHIPPED | OUTPATIENT
Start: 2021-03-02 | End: 2021-03-22

## 2021-03-02 RX ORDER — CARVEDILOL 3.12 MG/1
3.12 TABLET ORAL 2 TIMES DAILY
Qty: 180 TABLET | Refills: 3 | Status: SHIPPED | OUTPATIENT
Start: 2021-03-02 | End: 2021-03-22

## 2021-03-02 NOTE — PROGRESS NOTES
[facility] [place]  [address] [id number]  Cardiology Follow Up    Pawel Mccall  1960  [id number]      1  Chronic anticoagulation     2  Dyspnea  apixaban (ELIQUIS) 5 mg   3  Hypertension, essential  carvedilol (COREG) 3 125 mg tablet    CBC and differential   4  Essential hypertension  losartan (COZAAR) 25 mg tablet    Comprehensive metabolic panel   5  Combined hyperlipidemia  Lipid panel       Chief Complaint   Patient presents with    Follow-up       Interval History:   Patient presents for follow-up visit  Patient has history of hypertension hyperlipidemia and smoking as well as COPD  Patient had pulmonary embolism about 1 year ago  Patient is regularly followed by Pulmonary  Patient undergoing hematological workup for any coagulation disorder for etiology of pulmonary embolism  Patient denies any bleeding issues  Patient continues to smoke in spite of repeated counseling  No chest pain  No shortness of breath out of the ordinary  No history of leg edema orthopnea PND  No history of bleeding issues  He states that he has been compliant with all his present medications      Patient Active Problem List   Diagnosis    Hypertension, essential    Asthma    Tobacco abuse    Dyspnea    Pulmonary emboli (HCC)     Past Medical History:   Diagnosis Date    Asthma     Cardiomyopathy ([facility] [address] [address] [facility]     [facility]     Hypertension      Social History     Socioeconomic History    Marital status: /Civil Union     Spouse name: Not on file    Number of children: Not on file    Years of education: Not on file    Highest education level: Not on file   Occupational History    Not on file   Social Needs    Financial resource strain: Not on file    Food insecurity     Worry: Not on file     Inability: Not on file    Transportation needs     Medical: Not on file     Non-medical: Not on file   Tobacco Use    Smoking status: Current Every Day Smoker     Packs/day: 0 50     Types: Cigarettes    Smokeless tobacco: Never Used   Substance and Sexual Activity    Alcohol use: No     Frequency: Never     Binge frequency: Never    Drug use: No    Sexual activity: Not on file   Lifestyle    Physical activity     Days per week: Not on file     Minutes per session: Not on file    Stress: Not on file   Relationships    Social connections     Talks on phone: Not on file     Gets together: Not on file     Attends Yazdanism service: Not on file     Active member of club or organization: Not on file     Attends meetings of clubs or organizations: Not on file     Relationship status: Not on file    Intimate partner violence     Fear of current or ex partner: Not on file     Emotionally abused: Not on file     Physically abused: Not on file     Forced sexual activity: Not on file   Other Topics Concern    Not on file   Social History Narrative    Not on file      Family History   Problem Relation Age of Onset    Deep vein thrombosis Father      No past surgical history on file      Current Outpatient Medications:     albuterol (PROAIR HFA) 90 mcg/act inhaler, Inhale 1 puff every 4 (four) hours as needed for wheezing or shortness of breath, Disp: 1 Inhaler, Rfl: 0    aspirin 81 MG tablet, Take 1 tablet by mouth daily, Disp: , Rfl:     carvedilol (COREG) 3 125 mg tablet, Take 1 tablet (3 125 mg total) by mouth 2 (two) times a day, Disp: 180 tablet, Rfl: 3    fluticasone-salmeterol (ADVAIR DISKUS) 250-50 mcg/dose inhaler, Inhale, Disp: , Rfl:     losartan (COZAAR) 25 mg tablet, Take 1 tablet (25 mg total) by mouth daily, Disp: 90 tablet, Rfl: 3    montelukast (SINGULAIR) 10 mg tablet, Take by mouth, Disp: , Rfl:     apixaban (ELIQUIS) 5 mg, Take 1 tablet (5 mg total) by mouth 2 (two) times a day, Disp: 180 tablet, Rfl: 3    tiotropium (SPIRIVA HANDIHALER) 18 mcg inhalation capsule, Place into inhaler and inhale, Disp: , Rfl: Allergies   Allergen Reactions    Naproxen GI Intolerance       Labs:  No visits with results within 2 Month(s) from this visit     Latest known visit with results is:   Admission on 01/12/2020, Discharged on 01/14/2020   Component Date Value    WBC 01/12/2020 7 17     RBC 01/12/2020 4 92     Hemoglobin 01/12/2020 16 2     Hematocrit 01/12/2020 47 0     MCV 01/12/2020 96     MCH 01/12/2020 32 9     MCHC 01/12/2020 34 5     RDW 01/12/2020 12 4     MPV 01/12/2020 9 1     Platelets 01/78/1187 347     nRBC 01/12/2020 0     Neutrophils Relative 01/12/2020 87*    Immat GRANS % 01/12/2020 1     Lymphocytes Relative 01/12/2020 7*    Monocytes Relative 01/12/2020 4     Eosinophils Relative 01/12/2020 0     Basophils Relative 01/12/2020 1     Neutrophils Absolute 01/12/2020 6 28     Immature Grans Absolute 01/12/2020 0 04     Lymphocytes Absolute 01/12/2020 0 48*    Monocytes Absolute 01/12/2020 0 29     Eosinophils Absolute 01/12/2020 0 01     Basophils Absolute 01/12/2020 0 07     Protime 01/12/2020 12 0     INR 01/12/2020 0 88     PTT 01/12/2020 26     Sodium 01/12/2020 142     Potassium 01/12/2020 4 8     Chloride 01/12/2020 106     CO2 01/12/2020 24     ANION GAP 01/12/2020 12     BUN 01/12/2020 16     Creatinine 01/12/2020 1 12     Glucose 01/12/2020 146*    Calcium 01/12/2020 9 0     eGFR 01/12/2020 72     Total Bilirubin 01/12/2020 0 20     Bilirubin, Direct 01/12/2020 0 07     Alkaline Phosphatase 01/12/2020 60     AST 01/12/2020 9     ALT 01/12/2020 19     Total Protein 01/12/2020 7 9     Albumin 01/12/2020 4 1     NT-proBNP 01/12/2020 88     Troponin I 01/12/2020 <0 02     LACTIC ACID 01/12/2020 2 1*    LACTIC ACID 01/12/2020 1 9     Magnesium 01/12/2020 2 0     pH, [place] 01/12/2020 7 390     pCO2, [place] 01/12/2020 34 8*    pO2, [place] 01/12/2020 70 2*    HCO3, [place] 01/12/2020 20 6*    Base Excess, [place] 01/12/2020 -3 5     O2 Content, [place] 01/12/2020 21 7     O2 HGB, VENOUS 01/12/2020 93 6*    Troponin I 01/13/2020 <0 02     Troponin I 01/13/2020 <0 02     Sodium 01/13/2020 138     Potassium 01/13/2020 4 2     Chloride 01/13/2020 102     CO2 01/13/2020 23     ANION GAP 01/13/2020 13     BUN 01/13/2020 14     Creatinine 01/13/2020 0 95     Glucose 01/13/2020 135     Glucose, Fasting 01/13/2020 135*    Calcium 01/13/2020 8 7     eGFR 01/13/2020 88     WBC 01/13/2020 11 62*    RBC 01/13/2020 4 81     Hemoglobin 01/13/2020 15 6     Hematocrit 01/13/2020 45 8     MCV 01/13/2020 95     MCH 01/13/2020 32 4     MCHC 01/13/2020 34 1     RDW 01/13/2020 12 3     MPV 01/13/2020 9 0     Platelets 61/82/4396 335     nRBC 01/13/2020 0     Neutrophils Relative 01/13/2020 85*    Immat GRANS % 01/13/2020 1     Lymphocytes Relative 01/13/2020 8*    Monocytes Relative 01/13/2020 5     Eosinophils Relative 01/13/2020 0     Basophils Relative 01/13/2020 1     Neutrophils Absolute 01/13/2020 9 98*    Immature Grans Absolute 01/13/2020 0 08     Lymphocytes Absolute 01/13/2020 0 90     Monocytes Absolute 01/13/2020 0 60     Eosinophils Absolute 01/13/2020 0 00     Basophils Absolute 01/13/2020 0 06     Ventricular Rate 01/13/2020 71     Atrial Rate 01/13/2020 71     UT Interval 01/13/2020 132     QRSD Interval 01/13/2020 100     QT Interval 01/13/2020 402     QTC Interval 01/13/2020 436     P Axis 01/13/2020 68     QRS Axis 01/13/2020 78     T Wave Axis 01/13/2020 69     Ventricular Rate 01/12/2020 82     Atrial Rate 01/12/2020 82     UT Interval 01/12/2020 126     QRSD Interval 01/12/2020 96     QT Interval 01/12/2020 372     QTC Interval 01/12/2020 434     P Axis 01/12/2020 73     QRS Axis 01/12/2020 81     T Wave Axis 01/12/2020 74     Sodium 01/14/2020 138     Potassium 01/14/2020 3 9     Chloride 01/14/2020 103     CO2 01/14/2020 22     ANION GAP 01/14/2020 13     BUN 01/14/2020 22     Creatinine 01/14/2020 1 01     Glucose 01/14/2020 94  Calcium 01/14/2020 8 8     eGFR 01/14/2020 82     WBC 01/14/2020 7 51     RBC 01/14/2020 4 81     Hemoglobin 01/14/2020 15 9     Hematocrit 01/14/2020 46 3     MCV 01/14/2020 96     MCH 01/14/2020 33 1     MCHC 01/14/2020 34 3     RDW 01/14/2020 12 7     Platelets 16/01/8779 318     MPV 01/14/2020 9 2      Imaging: No results found  Review of Systems:  Review of Systems     REVIEW OF SYSTEMS:  Constitutional:  Denies fever or chills   Eyes:  Denies change in visual acuity   HENT:  Denies nasal congestion or sore throat   Respiratory:   shortness of breath   Cardiovascular:  Denies chest pain or edema   GI:  Denies abdominal pain, nausea, vomiting, bloody stools or diarrhea   :  Denies dysuria, frequency, difficulty in micturition and nocturia  Musculoskeletal:  Denies back pain or joint pain   Neurologic:  Denies headache, focal weakness or sensory changes   Endocrine:  Denies polyuria or polydipsia   Lymphatic:  Denies swollen glands   Psychiatric:  Denies depression or anxiety     Physical Exam:    /80   Pulse 72   Ht 5' 9" (1 753 m)   Wt 76 2 kg (168 lb)   SpO2 98%   BMI 24 81 kg/m²     Physical Exam   PHYSICAL EXAM:  General:  Patient is not in acute distress   Head: Normocephalic, Atraumatic  HEENT:  Both pupils normal-size atraumatic, normocephalic, nonicteric  Neck:  JVP not raised  Trachea central  No carotid bruit  Respiratory:  Decreased breath sounds bilateral  Cardiovascular:  Regular rate and rhythm no S3 no murmurs  GI:  Abdomen soft nontender  No organomegaly  Lymphatic:  No cervical or inguinal lymphadenopathy  Neurologic:  Patient is awake alert, oriented   Grossly nonfocal    Extremities no edema    Discussion/Summary:   Patient with multiple medical problems who seems to be doing reasonably well from cardiac standpoint  Previous studies reviewed with patient  Medications reviewed and possible side effects discussed   concepts of cardiovascular disease , signs and symptoms of heart disease  Dietary and risk factor modification reinforced  All questions answered  Safety measures reviewed  Patient advised to report any problems prompting medical attention  pulmonary evaluation by his pulmonary physician at [facility] reviewed  Patient understands the risks and benefits of anticoagulation  Patient has follow-up appointment with Pulmonary regarding decision regarding long-term continuation of anticoagulation  Patient counseled about smoking to prevent future cardiovascular events  Check blood work  Follow-up in 6 months or earlier as needed  Patient is agreeable with the plan of care

## 2021-03-10 ENCOUNTER — APPOINTMENT (EMERGENCY)
Dept: ULTRASOUND IMAGING | Facility: HOSPITAL | Age: 61
End: 2021-03-10
Payer: COMMERCIAL

## 2021-03-10 ENCOUNTER — APPOINTMENT (EMERGENCY)
Dept: RADIOLOGY | Facility: HOSPITAL | Age: 61
End: 2021-03-10
Payer: COMMERCIAL

## 2021-03-10 ENCOUNTER — HOSPITAL ENCOUNTER (EMERGENCY)
Facility: HOSPITAL | Age: 61
Discharge: HOME | End: 2021-03-10
Attending: EMERGENCY MEDICINE
Payer: COMMERCIAL

## 2021-03-10 VITALS
SYSTOLIC BLOOD PRESSURE: 178 MMHG | WEIGHT: 168 LBS | TEMPERATURE: 97.8 F | HEART RATE: 90 BPM | OXYGEN SATURATION: 99 % | DIASTOLIC BLOOD PRESSURE: 94 MMHG | BODY MASS INDEX: 24.81 KG/M2 | RESPIRATION RATE: 18 BRPM

## 2021-03-10 DIAGNOSIS — M25.569 KNEE PAIN: ICD-10-CM

## 2021-03-10 DIAGNOSIS — R58 ECCHYMOSIS: Primary | ICD-10-CM

## 2021-03-10 PROCEDURE — 76882 US LMTD JT/FCL EVL NVASC XTR: CPT

## 2021-03-10 PROCEDURE — 73564 X-RAY EXAM KNEE 4 OR MORE: CPT

## 2021-03-10 PROCEDURE — 99282 EMERGENCY DEPT VISIT SF MDM: CPT | Performed by: EMERGENCY MEDICINE

## 2021-03-10 PROCEDURE — 99284 EMERGENCY DEPT VISIT MOD MDM: CPT

## 2021-03-10 NOTE — Clinical Note
Daquanla Dick was seen and treated in our emergency department on 3/10/2021  Diagnosis:     Eitan Presley  may return to work on return date  He may return on this date: 03/12/2021         If you have any questions or concerns, please don't hesitate to call        Thomas Ross MD    ______________________________           _______________          _______________  Hospital Representative                              Date                                Time

## 2021-03-20 NOTE — ED PROVIDER NOTES
History  Chief Complaint   Patient presents with    Knee Swelling     pt reports R knee swelling and discoloration x2 days  unknown injury  reports no other symptoms  61 yo m with right knee swelling and discoloration x 2 days  Pt has echymosis of his r knee and calf  Mild aching discomfort  No numbness or weakness  Pt is on blood thinners  No trauma  Prior to Admission Medications   Prescriptions Last Dose Informant Patient Reported? Taking? albuterol (PROAIR HFA) 90 mcg/act inhaler  Self No No   Sig: Inhale 1 puff every 4 (four) hours as needed for wheezing or shortness of breath   apixaban (ELIQUIS) 5 mg   No No   Sig: Take 1 tablet (5 mg total) by mouth 2 (two) times a day   aspirin 81 MG tablet  Self Yes No   Sig: Take 1 tablet by mouth daily   carvedilol (COREG) 3 125 mg tablet   No No   Sig: Take 1 tablet (3 125 mg total) by mouth 2 (two) times a day   fluticasone-salmeterol (ADVAIR DISKUS) 250-50 mcg/dose inhaler  Self Yes No   Sig: Inhale   losartan (COZAAR) 25 mg tablet   No No   Sig: Take 1 tablet (25 mg total) by mouth daily   montelukast (SINGULAIR) 10 mg tablet  Self Yes No   Sig: Take by mouth   tiotropium (SPIRIVA HANDIHALER) 18 mcg inhalation capsule  Self Yes No   Sig: Place into inhaler and inhale      Facility-Administered Medications: None       Past Medical History:   Diagnosis Date    Asthma     [facility]     [facility]     Hypertension        History reviewed  No pertinent surgical history  Family History   Problem Relation Age of Onset    Deep vein thrombosis Father      I have reviewed and agree with the history as documented  E-Cigarette/Vaping    E-Cigarette Use Never User      E-Cigarette/Vaping Substances     Social History     Tobacco Use    Smoking status: Current Every Day Smoker     Packs/day: 0 50     Types: Cigarettes    Smokeless tobacco: Never Used   Substance Use Topics    Alcohol use:  No Frequency: Never     Binge frequency: Never    Drug use: No       Review of Systems   Constitutional: Negative for appetite change, chills, fatigue and fever  HENT: Negative for sneezing and sore throat  Eyes: Negative for visual disturbance  Respiratory: Negative for cough, choking, chest tightness, shortness of breath and wheezing  Cardiovascular: Negative for chest pain and palpitations  Gastrointestinal: Negative for abdominal pain, constipation, diarrhea, nausea and vomiting  Genitourinary: Negative for difficulty urinating and dysuria  Musculoskeletal: Positive for arthralgias and joint swelling  Neurological: Negative for dizziness, weakness, light-headedness, numbness and headaches  All other systems reviewed and are negative  Physical Exam  Physical Exam  Constitutional:       General: He is not in acute distress  Appearance: He is well-developed  He is not diaphoretic  HENT:      Head: Normocephalic and atraumatic  Eyes:      Pupils: Pupils are equal, round, and reactive to light  Neck:      Vascular: No JVD  Trachea: No tracheal deviation  Cardiovascular:      Rate and Rhythm: Normal rate and regular rhythm  Heart sounds: Normal heart sounds  No murmur  No friction rub  No gallop  Pulmonary:      Effort: Pulmonary effort is normal  No respiratory distress  Breath sounds: Normal breath sounds  No wheezing or rales  Abdominal:      General: Bowel sounds are normal  There is no distension  Palpations: Abdomen is soft  Tenderness: There is no abdominal tenderness  There is no guarding or rebound  Musculoskeletal:      Comments: r knee ecchymosis and mild effusion, no pain with rom,    Skin:     General: Skin is warm and dry  Coloration: Skin is not pale  Neurological:      Mental Status: He is alert and oriented to person, place, and time  Cranial Nerves: No cranial nerve deficit  Motor: No abnormal muscle tone  Psychiatric:         Behavior: Behavior normal          Vital Signs  ED Triage Vitals [03/10/21 1853]   Temperature Pulse Respirations Blood Pressure SpO2   97 8 °F (36 6 °C) 90 18 (!) 178/94 99 %      Temp src Heart Rate Source Patient Position - Orthostatic VS BP Location FiO2 (%)   -- -- -- -- --      Pain Score       No Pain           Vitals:    03/10/21 1853   BP: (!) 178/94   Pulse: 90         Visual Acuity      ED Medications  Medications - No data to display    Diagnostic Studies  Results Reviewed     None                 XR knee 4+ vw right injury   Final Result by George Hinds MD (03/11 8487)   No acute osseous abnormality  Workstation performed: [id number]      US extremity soft tissue   Final Result by Leandra Alcaraz MD (03/10 2009)   Small right knee joint effusion  Workstation performed: [id number]                 Procedures  Procedures         ED Course                                           MDM  Number of Diagnoses or Management Options  Ecchymosis:   Knee pain:   Diagnosis management comments: 61 yo m with r knee contusion, will check us, reassess  Disposition  Final diagnoses:   Ecchymosis   Knee pain     Time reflects when diagnosis was documented in both MDM as applicable and the Disposition within this note     Time User Action Codes Description Comment    3/10/2021  9:23 PM Manuelito Dos Santos Add [R58] Ecchymosis     3/10/2021  9:23 PM Jennifer, [facility] Knee pain       ED Disposition     ED Disposition Condition Date/Time Comment    Discharge Stable Wed Mar 10, 2021  9:23 PM Julia Aguila discharge to home/self care              Follow-up Information     Follow up With Specialties Details Why Contact Info Additional Information    [address] [facility]   [address] [place] [address] 42 [phone]  [address] [place] [address] [facility], [address] [place] [address], [facility], [place], [facility]          Discharge Medication List as of 3/10/2021  9:23 PM      CONTINUE these medications which have NOT CHANGED    Details   albuterol (PROAIR HFA) 90 mcg/act inhaler Inhale 1 puff every 4 (four) hours as needed for wheezing or shortness of breath, Starting Tue 2/6/2018, Normal      apixaban (ELIQUIS) 5 mg Take 1 tablet (5 mg total) by mouth 2 (two) times a day, Starting Tue 3/2/2021, Normal      aspirin 81 MG tablet Take 1 tablet by mouth daily, Starting Thu 2/19/2015, Historical Med      carvedilol (COREG) 3 125 mg tablet Take 1 tablet (3 125 mg total) by mouth 2 (two) times a day, Starting Tue 3/2/2021, Normal      fluticasone-salmeterol (ADVAIR DISKUS) 250-50 mcg/dose inhaler Inhale, Historical Med      losartan (COZAAR) 25 mg tablet Take 1 tablet (25 mg total) by mouth daily, Starting Tue 3/2/2021, Normal      montelukast (SINGULAIR) 10 mg tablet Take by mouth, Historical Med      tiotropium (SPIRIVA HANDIHALER) 18 mcg inhalation capsule Place into inhaler and inhale, Historical Med           No discharge procedures on file      PDMP Review     None          ED Provider  Electronically Signed by           Josh Schmitz MD  03/19/21 3550

## 2021-03-22 DIAGNOSIS — I10 HYPERTENSION, ESSENTIAL: ICD-10-CM

## 2021-03-22 DIAGNOSIS — I10 ESSENTIAL HYPERTENSION: ICD-10-CM

## 2021-03-22 RX ORDER — CARVEDILOL 3.12 MG/1
TABLET ORAL
Qty: 180 TABLET | Refills: 0 | Status: SHIPPED | OUTPATIENT
Start: 2021-03-22 | End: 2022-07-07 | Stop reason: CLARIF

## 2021-03-22 RX ORDER — LOSARTAN POTASSIUM 25 MG/1
TABLET ORAL
Qty: 30 TABLET | Refills: 0 | Status: SHIPPED | OUTPATIENT
Start: 2021-03-22 | End: 2021-03-25 | Stop reason: SDUPTHER

## 2021-03-25 DIAGNOSIS — I10 ESSENTIAL HYPERTENSION: ICD-10-CM

## 2021-03-25 RX ORDER — LOSARTAN POTASSIUM 25 MG/1
25 TABLET ORAL DAILY
Qty: 90 TABLET | Refills: 3 | Status: SHIPPED | OUTPATIENT
Start: 2021-03-25 | End: 2022-07-07 | Stop reason: CLARIF

## 2021-04-20 RX ORDER — CARVEDILOL 3.12 MG/1
TABLET ORAL
Qty: 180 TABLET | Refills: 3 | Status: SHIPPED | OUTPATIENT
Start: 2021-04-20

## 2021-04-20 RX ORDER — LOSARTAN POTASSIUM 25 MG/1
25 TABLET ORAL DAILY
Qty: 90 TABLET | Refills: 3 | Status: SHIPPED | OUTPATIENT
Start: 2021-04-20 | End: 2022-07-21 | Stop reason: SDUPTHER

## 2021-06-20 DIAGNOSIS — I10 HYPERTENSION, ESSENTIAL: Primary | ICD-10-CM

## 2021-06-20 RX ORDER — CARVEDILOL 3.12 MG/1
TABLET ORAL
Qty: 180 TABLET | Refills: 0 | Status: SHIPPED | OUTPATIENT
Start: 2021-06-20 | End: 2022-07-07 | Stop reason: CLARIF

## 2021-09-15 DIAGNOSIS — I10 ESSENTIAL HYPERTENSION: Primary | ICD-10-CM

## 2021-09-15 RX ORDER — CARVEDILOL 3.12 MG/1
TABLET ORAL
Qty: 180 TABLET | Refills: 0 | Status: SHIPPED | OUTPATIENT
Start: 2021-09-15 | End: 2022-07-07 | Stop reason: CLARIF

## 2021-09-26 DIAGNOSIS — I10 HYPERTENSION, ESSENTIAL: Primary | ICD-10-CM

## 2021-09-26 RX ORDER — CARVEDILOL 3.12 MG/1
TABLET ORAL
Qty: 180 TABLET | Refills: 0 | Status: SHIPPED | OUTPATIENT
Start: 2021-09-26 | End: 2022-07-07 | Stop reason: CLARIF

## 2022-01-12 DIAGNOSIS — R06.00 DYSPNEA: ICD-10-CM

## 2022-01-12 RX ORDER — APIXABAN 5 MG/1
TABLET, FILM COATED ORAL
Qty: 180 TABLET | Refills: 3 | Status: SHIPPED | OUTPATIENT
Start: 2022-01-12

## 2022-03-26 DIAGNOSIS — I10 HYPERTENSION, ESSENTIAL: Primary | ICD-10-CM

## 2022-03-26 RX ORDER — CARVEDILOL 3.12 MG/1
TABLET ORAL
Qty: 180 TABLET | Refills: 0 | Status: SHIPPED | OUTPATIENT
Start: 2022-03-26 | End: 2022-06-23

## 2022-07-07 ENCOUNTER — APPOINTMENT (EMERGENCY)
Dept: RADIOLOGY | Facility: HOSPITAL | Age: 62
End: 2022-07-07
Payer: COMMERCIAL

## 2022-07-07 ENCOUNTER — HOSPITAL ENCOUNTER (OUTPATIENT)
Facility: HOSPITAL | Age: 62
Setting detail: OBSERVATION
Discharge: HOME | End: 2022-07-09
Attending: INTERNAL MEDICINE | Admitting: INTERNAL MEDICINE
Payer: COMMERCIAL

## 2022-07-07 ENCOUNTER — APPOINTMENT (EMERGENCY)
Dept: CT IMAGING | Facility: HOSPITAL | Age: 62
End: 2022-07-07
Payer: COMMERCIAL

## 2022-07-07 DIAGNOSIS — R07.9 CHEST PAIN, UNSPECIFIED: ICD-10-CM

## 2022-07-07 DIAGNOSIS — F41.9 ANXIETY: ICD-10-CM

## 2022-07-07 DIAGNOSIS — R06.00 DYSPNEA: Primary | ICD-10-CM

## 2022-07-07 DIAGNOSIS — R93.89 ABNORMAL FINDING ON CT SCAN: ICD-10-CM

## 2022-07-07 PROBLEM — D72.829 LEUKOCYTOSIS: Status: ACTIVE | Noted: 2022-07-07

## 2022-07-07 LAB
2HR DELTA HS TROPONIN: 1 NG/L
ALBUMIN SERPL BCP-MCNC: 3.9 G/DL (ref 3.5–5)
ALP SERPL-CCNC: 67 U/L (ref 46–116)
ALT SERPL W P-5'-P-CCNC: 20 U/L (ref 12–78)
ANION GAP SERPL CALCULATED.3IONS-SCNC: 10 MMOL/L (ref 4–13)
APTT PPP: 34 SECONDS (ref 23–37)
AST SERPL W P-5'-P-CCNC: 15 U/L (ref 5–45)
BASOPHILS # BLD AUTO: 0.16 THOUSANDS/ΜL (ref 0–0.1)
BASOPHILS NFR BLD AUTO: 1 % (ref 0–1)
BILIRUB SERPL-MCNC: 0.61 MG/DL (ref 0.2–1)
BUN SERPL-MCNC: 11 MG/DL (ref 5–25)
CALCIUM SERPL-MCNC: 8.9 MG/DL (ref 8.3–10.1)
CARDIAC TROPONIN I PNL SERPL HS: 2 NG/L
CARDIAC TROPONIN I PNL SERPL HS: 3 NG/L
CHLORIDE SERPL-SCNC: 104 MMOL/L (ref 100–108)
CO2 SERPL-SCNC: 24 MMOL/L (ref 21–32)
CREAT SERPL-MCNC: 1 MG/DL (ref 0.6–1.3)
D DIMER PPP FEU-MCNC: 2.92 UG/ML FEU
EOSINOPHIL # BLD AUTO: 0.23 THOUSAND/ΜL (ref 0–0.61)
EOSINOPHIL NFR BLD AUTO: 1 % (ref 0–6)
ERYTHROCYTE [DISTWIDTH] IN BLOOD BY AUTOMATED COUNT: 13.2 % (ref 11.6–15.1)
GFR SERPL CREATININE-BSD FRML MDRD: 81 ML/MIN/1.73SQ M
GLUCOSE SERPL-MCNC: 114 MG/DL (ref 65–140)
HCT VFR BLD AUTO: 42.8 % (ref 36.5–49.3)
HGB BLD-MCNC: 14.6 G/DL (ref 12–17)
IMM GRANULOCYTES # BLD AUTO: 0.14 THOUSAND/UL (ref 0–0.2)
IMM GRANULOCYTES NFR BLD AUTO: 1 % (ref 0–2)
INR PPP: 1.04 (ref 0.84–1.19)
LACTATE SERPL-SCNC: 1.2 MMOL/L (ref 0.5–2)
LIPASE SERPL-CCNC: 68 U/L (ref 73–393)
LYMPHOCYTES # BLD AUTO: 1 THOUSANDS/ΜL (ref 0.6–4.47)
LYMPHOCYTES NFR BLD AUTO: 4 % (ref 14–44)
MCH RBC QN AUTO: 31.5 PG (ref 26.8–34.3)
MCHC RBC AUTO-ENTMCNC: 34.1 G/DL (ref 31.4–37.4)
MCV RBC AUTO: 92 FL (ref 82–98)
MONOCYTES # BLD AUTO: 1.01 THOUSAND/ΜL (ref 0.17–1.22)
MONOCYTES NFR BLD AUTO: 5 % (ref 4–12)
NEUTROPHILS # BLD AUTO: 20.06 THOUSANDS/ΜL (ref 1.85–7.62)
NEUTS SEG NFR BLD AUTO: 88 % (ref 43–75)
NRBC BLD AUTO-RTO: 0 /100 WBCS
NT-PROBNP SERPL-MCNC: 298 PG/ML
PLATELET # BLD AUTO: 392 THOUSANDS/UL (ref 149–390)
PMV BLD AUTO: 8.8 FL (ref 8.9–12.7)
POTASSIUM SERPL-SCNC: 4.6 MMOL/L (ref 3.5–5.3)
PROT SERPL-MCNC: 7.6 G/DL (ref 6.4–8.2)
PROTHROMBIN TIME: 13.2 SECONDS (ref 11.6–14.5)
RBC # BLD AUTO: 4.63 MILLION/UL (ref 3.88–5.62)
SODIUM SERPL-SCNC: 138 MMOL/L (ref 136–145)
WBC # BLD AUTO: 22.6 THOUSAND/UL (ref 4.31–10.16)

## 2022-07-07 PROCEDURE — 93005 ELECTROCARDIOGRAM TRACING: CPT

## 2022-07-07 PROCEDURE — 83880 ASSAY OF NATRIURETIC PEPTIDE: CPT | Performed by: PHYSICIAN ASSISTANT

## 2022-07-07 PROCEDURE — 71046 X-RAY EXAM CHEST 2 VIEWS: CPT

## 2022-07-07 PROCEDURE — 99215 OFFICE O/P EST HI 40 MIN: CPT | Performed by: INTERNAL MEDICINE

## 2022-07-07 PROCEDURE — 84484 ASSAY OF TROPONIN QUANT: CPT | Performed by: PHYSICIAN ASSISTANT

## 2022-07-07 PROCEDURE — 85730 THROMBOPLASTIN TIME PARTIAL: CPT | Performed by: PHYSICIAN ASSISTANT

## 2022-07-07 PROCEDURE — 99220 PR INITIAL OBSERVATION CARE/DAY 70 MINUTES: CPT | Performed by: INTERNAL MEDICINE

## 2022-07-07 PROCEDURE — 85379 FIBRIN DEGRADATION QUANT: CPT | Performed by: PHYSICIAN ASSISTANT

## 2022-07-07 PROCEDURE — 85610 PROTHROMBIN TIME: CPT | Performed by: PHYSICIAN ASSISTANT

## 2022-07-07 PROCEDURE — 99285 EMERGENCY DEPT VISIT HI MDM: CPT

## 2022-07-07 PROCEDURE — 80053 COMPREHEN METABOLIC PANEL: CPT | Performed by: PHYSICIAN ASSISTANT

## 2022-07-07 PROCEDURE — 83690 ASSAY OF LIPASE: CPT | Performed by: PHYSICIAN ASSISTANT

## 2022-07-07 PROCEDURE — 36415 COLL VENOUS BLD VENIPUNCTURE: CPT | Performed by: PHYSICIAN ASSISTANT

## 2022-07-07 PROCEDURE — 94640 AIRWAY INHALATION TREATMENT: CPT

## 2022-07-07 PROCEDURE — 99285 EMERGENCY DEPT VISIT HI MDM: CPT | Performed by: PHYSICIAN ASSISTANT

## 2022-07-07 PROCEDURE — 99406 BEHAV CHNG SMOKING 3-10 MIN: CPT | Performed by: INTERNAL MEDICINE

## 2022-07-07 PROCEDURE — 85025 COMPLETE CBC W/AUTO DIFF WBC: CPT | Performed by: PHYSICIAN ASSISTANT

## 2022-07-07 PROCEDURE — 87040 BLOOD CULTURE FOR BACTERIA: CPT | Performed by: PHYSICIAN ASSISTANT

## 2022-07-07 PROCEDURE — 83605 ASSAY OF LACTIC ACID: CPT | Performed by: PHYSICIAN ASSISTANT

## 2022-07-07 PROCEDURE — 71275 CT ANGIOGRAPHY CHEST: CPT

## 2022-07-07 RX ORDER — NICOTINE 21 MG/24HR
1 PATCH, TRANSDERMAL 24 HOURS TRANSDERMAL DAILY
Status: DISCONTINUED | OUTPATIENT
Start: 2022-07-08 | End: 2022-07-09 | Stop reason: HOSPADM

## 2022-07-07 RX ORDER — ASPIRIN 81 MG/1
81 TABLET, CHEWABLE ORAL DAILY
Status: DISCONTINUED | OUTPATIENT
Start: 2022-07-08 | End: 2022-07-09 | Stop reason: HOSPADM

## 2022-07-07 RX ORDER — ALBUTEROL SULFATE 90 UG/1
1 AEROSOL, METERED RESPIRATORY (INHALATION) EVERY 4 HOURS PRN
Status: DISCONTINUED | OUTPATIENT
Start: 2022-07-07 | End: 2022-07-09 | Stop reason: HOSPADM

## 2022-07-07 RX ORDER — CARVEDILOL 3.12 MG/1
3.12 TABLET ORAL 2 TIMES DAILY WITH MEALS
Status: DISCONTINUED | OUTPATIENT
Start: 2022-07-07 | End: 2022-07-09 | Stop reason: HOSPADM

## 2022-07-07 RX ORDER — MONTELUKAST SODIUM 10 MG/1
10 TABLET ORAL DAILY
Status: DISCONTINUED | OUTPATIENT
Start: 2022-07-08 | End: 2022-07-09 | Stop reason: HOSPADM

## 2022-07-07 RX ORDER — IPRATROPIUM BROMIDE AND ALBUTEROL SULFATE 2.5; .5 MG/3ML; MG/3ML
3 SOLUTION RESPIRATORY (INHALATION) ONCE
Status: COMPLETED | OUTPATIENT
Start: 2022-07-07 | End: 2022-07-07

## 2022-07-07 RX ORDER — ASPIRIN 81 MG/1
324 TABLET, CHEWABLE ORAL ONCE
Status: COMPLETED | OUTPATIENT
Start: 2022-07-07 | End: 2022-07-07

## 2022-07-07 RX ORDER — ACETAMINOPHEN 325 MG/1
650 TABLET ORAL EVERY 6 HOURS PRN
Status: DISCONTINUED | OUTPATIENT
Start: 2022-07-07 | End: 2022-07-09 | Stop reason: HOSPADM

## 2022-07-07 RX ORDER — ONDANSETRON 2 MG/ML
4 INJECTION INTRAMUSCULAR; INTRAVENOUS EVERY 6 HOURS PRN
Status: DISCONTINUED | OUTPATIENT
Start: 2022-07-07 | End: 2022-07-09 | Stop reason: HOSPADM

## 2022-07-07 RX ORDER — LOSARTAN POTASSIUM 25 MG/1
25 TABLET ORAL DAILY
Status: DISCONTINUED | OUTPATIENT
Start: 2022-07-08 | End: 2022-07-09 | Stop reason: HOSPADM

## 2022-07-07 RX ADMIN — FLUTICASONE FUROATE AND VILANTEROL TRIFENATATE 1 PUFF: 100; 25 POWDER RESPIRATORY (INHALATION) at 23:16

## 2022-07-07 RX ADMIN — IOHEXOL 70 ML: 350 INJECTION, SOLUTION INTRAVENOUS at 08:20

## 2022-07-07 RX ADMIN — APIXABAN 5 MG: 5 TABLET, FILM COATED ORAL at 17:40

## 2022-07-07 RX ADMIN — CARVEDILOL 3.12 MG: 3.12 TABLET, FILM COATED ORAL at 17:40

## 2022-07-07 RX ADMIN — IPRATROPIUM BROMIDE AND ALBUTEROL SULFATE 3 ML: 2.5; .5 SOLUTION RESPIRATORY (INHALATION) at 07:41

## 2022-07-07 RX ADMIN — ASPIRIN 324 MG: 81 TABLET, CHEWABLE ORAL at 07:41

## 2022-07-07 RX ADMIN — UMECLIDINIUM 1 PUFF: 62.5 AEROSOL, POWDER ORAL at 23:16

## 2022-07-07 NOTE — CONSULTS
Consultation - Cardiology   Tejinder Lee 62 y o  male MRN: [id number]  Unit/Bed#: ED 12 Encounter: [id number]  07/07/22  3:59 PM    Assessment/ Plan:  1  Shortness of breath, possible anginal equivalent  · Patient endorses acute onset shortness of breath  Denies chest pain however possibly anginal equivalent  · HS troponin negative  · NT proBNP 298  · TTE and Pharmacologic MPI ordered  2  Hypertension  · BP reasonably controlled  3  Hyperlipidemia  · Patient not currently on statin    4  History of PE on Eliquis    5  COPD    6  Tobacco use  · Cessation advised  History of Present Illness   Physician Requesting Consult: Indira Carr,*    Reason for Consult / Principal Problem:  Dyspnea    HPI: Tejinder Lee is a 62y o  year old male with PMH of hypertension, hyperlipidemia, PE on Eliquis, COPD, and cardiomyopathy with recovered EF who presents with sudden onset of shortness of breath  He notes that his shortness of breath is similar to when he had cardiomyopathy  He denies any chest pain or palpitations  He denies any lower extremity edema  Patient follows with Dr Jomar Kent  He reports adherence to his medications  Patient does currently smoke approx 1/2 PPD  Inpatient consult to Cardiology  Consult performed by: NATE Lamb  Consult ordered by: Eliu Claudio PA-C          EKG:  Normal sinus rhythm      Review of Systems   Constitutional: Negative for chills, diaphoresis and fever  Respiratory: Positive for shortness of breath  Negative for cough and chest tightness  Cardiovascular: Negative for chest pain, palpitations and leg swelling  Gastrointestinal: Negative for abdominal distention, blood in stool, nausea and vomiting  Genitourinary: Negative for difficulty urinating  Musculoskeletal: Negative for arthralgias and back pain  Neurological: Negative for dizziness, syncope, light-headedness and headaches     Psychiatric/Behavioral: Negative for agitation and confusion  The patient is not nervous/anxious  Historical Information   Past Medical History:   Diagnosis Date    Asthma     Cardiomyopathy [facility] [address] [facility]     COPD [facility]ch[facility] obstructive pulmonary disease) ([facility] )     Hypertension      History reviewed  No pertinent surgical history  Social History     Substance and Sexual Activity   Alcohol Use No     Social History     Substance and Sexual Activity   Drug Use No     Social History     Tobacco Use   Smoking Status Current Every Day Smoker    Packs/day: 0 50    Types: Cigarettes   Smokeless Tobacco Never Used       Family History:   Family History   Problem Relation Age of Onset    Deep vein thrombosis Father        Meds/Allergies   all current active meds have been reviewed and current meds:   Current Facility-Administered Medications   Medication Dose Route Frequency    acetaminophen (TYLENOL) tablet 650 mg  650 mg Oral Q6H PRN    albuterol (PROVENTIL HFA,VENTOLIN HFA) inhaler 1 puff  1 puff Inhalation Q4H PRN    apixaban (ELIQUIS) tablet 5 mg  5 mg Oral BID    [START ON 7/8/2022] aspirin chewable tablet 81 mg  81 mg Oral Daily    carvedilol (COREG) tablet 3 125 mg  3 125 mg Oral BID With Meals    [START ON 7/8/2022] fluticasone-vilanterol (BREO ELLIPTA) 100-25 mcg/inh inhaler 1 puff  1 puff Inhalation Daily    [START ON 7/8/2022] losartan (COZAAR) tablet 25 mg  25 mg Oral Daily    [START ON 7/8/2022] montelukast (SINGULAIR) tablet 10 mg  10 mg Oral Daily    [START ON 7/8/2022] nicotine (NICODERM CQ) 21 mg/24 hr TD 24 hr patch 1 patch  1 patch Transdermal Daily    ondansetron (ZOFRAN) injection 4 mg  4 mg Intravenous Q6H PRN    [START ON 7/8/2022] umeclidinium bromide (INCRUSE ELLIPTA) 62 5 mcg/inh inhaler AEPB 1 puff  1 puff Inhalation Daily     Allergies   Allergen Reactions    Naproxen GI Intolerance       Objective   Vitals: Blood pressure 132/75, pulse 77, temperature 98 5 °F (36 9 °C), temperature source Oral, resp   rate 17, weight 68 9 kg (151 lb 14 4 oz), SpO2 94 %  , Body mass index is 22 43 kg/m² ,   Orthostatic Blood Pressures    Flowsheet Row Most Recent Value   Blood Pressure 132/75 filed at 07/07/2022 1245   Patient Position - Orthostatic VS Sitting filed at 07/07/2022 7075          Systolic (60RHS), MMA:433 , Min:132 , OCC:112     Diastolic (45NQS), EMMIE:47, Min:75, Max:79      No intake or output data in the 24 hours ending 07/07/22 1559    Invasive Devices  Report    Peripheral Intravenous Line  Duration           Peripheral IV 07/07/22 Right Antecubital <1 day                    Physical Exam:  Physical Exam  Vitals and nursing note reviewed  Constitutional:       Appearance: He is well-developed  HENT:      Head: Normocephalic and atraumatic  Eyes:      Conjunctiva/sclera: Conjunctivae normal    Cardiovascular:      Rate and Rhythm: Normal rate and regular rhythm  Heart sounds: Normal heart sounds  No murmur heard  Pulmonary:      Effort: Pulmonary effort is normal  No respiratory distress  Breath sounds: Normal breath sounds  Abdominal:      Palpations: Abdomen is soft  Tenderness: There is no abdominal tenderness  Musculoskeletal:      Cervical back: Neck supple  Right lower leg: No edema  Left lower leg: No edema  Skin:     General: Skin is warm and dry  Neurological:      Mental Status: He is alert and oriented to person, place, and time     Psychiatric:         Mood and Affect: Mood normal          Behavior: Behavior normal           Lab Results:     Cardiac Profile:   Results from last 7 days   Lab Units 07/07/22  0911 07/07/22  0723   HS TNI 0HR ng/L  --  2   HS TNI 2HR ng/L 3  --    HSTNI D2 ng/L 1  --    NT-PRO BNP pg/mL  --  298*       CBC with diff:   Results from last 7 days   Lab Units 07/07/22  0723   WBC Thousand/uL 22 60*   HEMOGLOBIN g/dL 14 6   HEMATOCRIT % 42 8   MCV fL 92   PLATELETS Thousands/uL 392*   MCH pg 31 5   MCHC g/dL 34 1   RDW % 13 2   MPV fL 8 8*   NRBC AUTO /100 WBCs 0 CMP:   Results from last 7 days   Lab Units 07/07/22  0723   POTASSIUM mmol/L 4 6   CHLORIDE mmol/L 104   CO2 mmol/L 24   BUN mg/dL 11   CREATININE mg/dL 1 00   CALCIUM mg/dL 8 9   AST U/L 15   ALT U/L 20   ALK PHOS U/L 67   EGFR ml/min/1 73sq m 81

## 2022-07-07 NOTE — ASSESSMENT & PLAN NOTE
· CTA done for dyspnea purposes showed " Abnormal appearance of the spleen, in addition to typical arterial phase enhancement, this appearance could also be due to infarcts, correlate clinically   "  · Clinically patient denies any type of abdominal symptoms such as pain, he feels otherwise fine  Furthermore he is already on anticoagulation for PE  · Monitor clinically

## 2022-07-07 NOTE — ED NOTES
Pt provided with a menu and informed he is pending a cardiology consult      Pascale Wyatt, DEB  07/07/22 9075

## 2022-07-07 NOTE — ED NOTES
Pt sleeping and resting comfortably at this time showing no signs of distress        Helene Zafar RN  07/07/22 5894

## 2022-07-07 NOTE — ASSESSMENT & PLAN NOTE
· Noted for white count of [address] but otherwise denies any fevers or chills, no other localizing signs of infection  , no infiltrates on imaging  · Monitor

## 2022-07-07 NOTE — H&P
[facility]  H&P- Jhony Putnam 1960, 62 y o  male MRN: [id number]  Unit/Bed#: ED 12 Encounter: [id number]  Primary Care Provider: No primary care provider on file  Date and time admitted to hospital: 7/7/2022  6:47 AM    Abnormal CT scan  Assessment & Plan    · CTA done for dyspnea purposes showed " Abnormal appearance of the spleen, in addition to typical arterial phase enhancement, this appearance could also be due to infarcts, correlate clinically   "  · Clinically patient denies any type of abdominal symptoms such as pain, he feels otherwise fine  Furthermore he is already on anticoagulation for PE  · Monitor clinically  Leukocytosis  Assessment & Plan    · Noted for [place] of 879[address] but otherwise denies any fevers or chills, no other localizing signs of infection  , no infiltrates on imaging  · Monitor    Pulmonary emboli (HCC)  Assessment & Plan    · On apixaban, continue    Tobacco abuse  Assessment & Plan    · Encouraging to smoking cessation, nicotine patch to be provided while in the hospital    Hypertension, essential  Assessment & Plan    · Continue losartan and carvedilol  · Monitor BP    * Dyspnea  Assessment & Plan  This is a 25-year-old male with a history of COPD and a previous PE who presented with sudden onset of shortness of breath which has now resolved  He also endorses a history of cardiomyopathy  He denies any worsening cough or congestion, denies any fever, denies any chest pain, denies any flu-like symptoms  His COPD appears well controlled  Emergency department has consulted Cardiology for dyspnea, they recommended to rule out angina equivalent    Patient's initial troponins negative, BNP mildly elevated    · Check echo as per Cardiology recommendation  · Check stress test as per Cardiology recommendation  · Continue inhalers  · Monitor clinically, ventilatory status    VTE Prophylaxis: Apixaban (Eliquis)  / sequential compression device   Code Status: FC  POLST: POLST form is not discussed and not completed at this time  Discussion with family: Wife at the bedside     Anticipated Length of Stay:  Patient will be admitted on an Observation basis with an anticipated length of stay of  Less than 2 midnights  Justification for Hospital Stay: workup for dyspnea    Total Time for Visit, including Counseling / Coordination of Care: 45 minutes  Greater than 50% of this total time spent on direct patient counseling and coordination of care  Chief Complaint:   Dyspnea    History of Present Illness:    Jessie Garduno is a 62 y o  male who presents with shortness of breath  Patient states that yesterday he started having sudden onset shortness of breath, he did have a normal day without any issues and he worked out without any chest pain however sudden he developed some degree of respiratory discomfort at rest, currently this has completely improved  He does endorse a history of COPD but he is compliant with his inhalers  He denies any worsening cough or sputum production  Patient also mentions a history of cardiomyopathy in the past  On arrival his white count was noted to be 24,560, neutrophilic  A CTA scan was done did not reveal any pulmonary embolism but there was incidental finding of "Abnormal appearance of the spleen, in addition to typical arterial phase enhancement, this appearance could also be due to infarcts, correlate clinically  "  Patient denied any abdominal pain or abdominal complains  He is on Eliquis for a history of PE    ED has discussed case with cardiology  who recommended an Echo and stress test therefore patient has been placed under observation  Review of Systems:    Review of Systems   Respiratory: Positive for shortness of breath  All other systems reviewed and are negative        Past Medical and Surgical History:     Past Medical History:   Diagnosis Date    Asthma     Cardiomyopathy [facility] [address] [facility]     [facility]) [facility] [address] [facility]     Hypertension        History reviewed  No pertinent surgical history  Meds/Allergies:    Prior to Admission medications    Medication Sig Start Date End Date Taking?  Authorizing Provider   albuterol (PROAIR HFA) 90 mcg/act inhaler Inhale 1 puff every 4 (four) hours as needed for wheezing or shortness of breath 2/6/18   Gee Baldwin MD   aspirin 81 MG tablet Take 1 tablet by mouth daily 2/19/15   Historical Provider, MD   carvedilol (COREG) 3 125 mg tablet 1 tab twice a day 4/20/21   Isidro Taylor MD   Eliquis 5 MG TAKE ONE TABLET BY MOUTH TWICE A DAY 1/12/22   La Owens MD   fluticasone-salmeterol (ADVAIR DISKUS) 250-50 mcg/dose inhaler Inhale    Historical Provider, MD   losartan (COZAAR) 25 mg tablet Take 1 tablet (25 mg total) by mouth daily 4/20/21   La Owens MD   montelukast (SINGULAIR) 10 mg tablet Take by mouth    Historical Provider, MD   tiotropium (Carissa Pablo) 18 mcg inhalation capsule Place into inhaler and inhale    Historical Provider, MD   carvedilol (COREG) 3 125 mg tablet TAKE ONE TABLET BY MOUTH TWICE A DAY 3/22/21 7/7/22  La Owens MD   carvedilol (COREG) 3 125 mg tablet TAKE ONE TABLET BY MOUTH TWICE A DAY 6/20/21 7/7/22  La Owens MD   carvedilol (COREG) 3 125 mg tablet TAKE ONE TABLET BY MOUTH TWICE A DAY 9/15/21 7/7/22  La Owens MD   carvedilol (COREG) 3 125 mg tablet TAKE ONE TABLET BY MOUTH TWICE A DAY 9/26/21 7/7/22  La Owens MD   carvedilol (COREG) 3 125 mg tablet TAKE ONE TABLET BY MOUTH TWICE A DAY 6/23/22 7/7/22  La Owens MD   losartan (COZAAR) 25 mg tablet Take 1 tablet (25 mg total) by mouth daily 3/25/21 7/7/22  Isidro Taylor MD   losartan (COZAAR) 25 mg tablet TAKE ONE TABLET BY MOUTH EVERY DAY 4/8/22 7/7/22  Isidro Taylor MD   losartan (COZAAR) 25 mg tablet TAKE ONE TABLET BY MOUTH EVERY DAY **MUST CALL MD FOR APPOINTMENT** 5/9/22 7/7/22  La MD Roman     I have reviewed home medications with patient personally  Allergies: Allergies   Allergen Reactions    Naproxen GI Intolerance       Social History:     Marital Status: /Civil Union   Substance Use History:   Social History     Substance and Sexual Activity   Alcohol Use No     Social History     Tobacco Use   Smoking Status Current Every Day Smoker    Packs/day: 0 50    Types: Cigarettes   Smokeless Tobacco Never Used     Social History     Substance and Sexual Activity   Drug Use No       Family History:    Family History   Problem Relation Age of Onset    Deep vein thrombosis Father        Physical Exam:     Vitals:   Blood Pressure: 147/74 (07/07/22 1700)  Pulse: 75 (07/07/22 1700)  Temperature: 98 5 °F (36 9 °C) (07/07/22 0650)  Temp Source: Oral (07/07/22 0650)  Respirations: 17 (07/07/22 1700)  Weight - Scale: 68 9 kg (151 lb 14 4 oz) (07/07/22 0650)  SpO2: 97 % (07/07/22 1700)    Physical Exam  Vitals and nursing note reviewed  Constitutional:       Appearance: Normal appearance  Comments: Male patient in bed, awake   HENT:      Head: Normocephalic and atraumatic  Right Ear: External ear normal       Left Ear: External ear normal       Nose: Nose normal  No congestion or rhinorrhea  Mouth/Throat:      Mouth: Mucous membranes are moist       Pharynx: Oropharynx is clear  No oropharyngeal exudate or posterior oropharyngeal erythema  Eyes:      General: No scleral icterus  Right eye: No discharge  Left eye: No discharge  Pupils: Pupils are equal, round, and reactive to light  Neck:      Vascular: No carotid bruit  Cardiovascular:      Rate and Rhythm: Normal rate and regular rhythm  Pulses: Normal pulses  Heart sounds: No murmur heard  No friction rub  No gallop  Pulmonary:      Effort: Pulmonary effort is normal  No respiratory distress  Breath sounds: Normal breath sounds  No stridor   No wheezing, rhonchi or rales    Abdominal:      General: Abdomen is flat  Bowel sounds are normal  There is no distension  Palpations: Abdomen is soft  There is no mass  Tenderness: There is no abdominal tenderness  There is no guarding or rebound  Hernia: No hernia is present  Musculoskeletal:         General: No swelling, tenderness, deformity or signs of injury  Normal range of motion  Cervical back: Normal range of motion  No rigidity  No muscular tenderness  Lymphadenopathy:      Cervical: No cervical adenopathy  Skin:     General: Skin is warm and dry  Capillary Refill: Capillary refill takes less than 2 seconds  Coloration: Skin is not jaundiced or pale  Findings: No bruising or erythema  Neurological:      General: No focal deficit present  Mental Status: He is alert and oriented to person, place, and time  Mental status is at baseline  Cranial Nerves: No cranial nerve deficit  Sensory: No sensory deficit  Motor: No weakness  Coordination: Coordination normal       Deep Tendon Reflexes: Reflexes normal    Psychiatric:         Mood and Affect: Mood normal          Behavior: Behavior normal          Thought Content: Thought content normal          Judgment: Judgment normal            Additional Data:     Lab Results: I have personally reviewed pertinent reports        Results from last 7 days   Lab Units 07/07/22  0723   WBC Thousand/uL 22 60*   HEMOGLOBIN g/dL 14 6   HEMATOCRIT % 42 8   PLATELETS Thousands/uL 392*   NEUTROS PCT % 88*   LYMPHS PCT % 4*   MONOS PCT % 5   EOS PCT % 1     Results from last 7 days   Lab Units 07/07/22  0723   SODIUM mmol/L 138   POTASSIUM mmol/L 4 6   CHLORIDE mmol/L 104   CO2 mmol/L 24   BUN mg/dL 11   CREATININE mg/dL 1 00   ANION GAP mmol/L 10   CALCIUM mg/dL 8 9   ALBUMIN g/dL 3 9   TOTAL BILIRUBIN mg/dL 0 61   ALK PHOS U/L 67   ALT U/L 20   AST U/L 15   GLUCOSE RANDOM mg/dL 114     Results from last 7 days   Lab Units 07/07/22  0723 INR  1 04             Results from last 7 days   Lab Units 07/07/22  0836   LACTIC ACID mmol/L 1 2       Imaging: I have personally reviewed pertinent reports  CTA ED chest PE Study   Final Result by Franc Herrmann MD (07/07 1890)      1  No pulmonary embolism   2  Abnormal appearance of the spleen, in addition to typical arterial phase enhancement, this appearance could also be due to infarcts, correlate clinically  I personally discussed this study  with Gustavo Schaefer on 7/7/2022 at 9:08 AM             Workstation performed: [id number]         XR chest pa & lateral   Final Result by Melchor Duncan MD (07/07 3212)      No acute cardiopulmonary disease  Workstation performed: [id number]                 Allscripts / Epic Records Reviewed: Yes     ** Please Note: This note has been constructed using a voice recognition system   **

## 2022-07-07 NOTE — ED NOTES
PULSE OX AMBULATION TRIAL:    Pt started in his room with no complaint of shortness of breath/lightheaded/dizzy     Starting sp02: 94ra  Hr: 98    Pt maintained spo2 and heart rate for the duration of the trial for 1 lap around the entire department         Rj Chandler  07/07/22 5673

## 2022-07-07 NOTE — ASSESSMENT & PLAN NOTE
This is a 15-year-old male with a history of COPD and a previous PE who presented with sudden onset of shortness of breath which has now resolved  He also endorses a history of cardiomyopathy  He denies any worsening cough or congestion, denies any fever, denies any chest pain, denies any flu-like symptoms  His COPD appears well controlled  Emergency department has consulted Cardiology for dyspnea, they recommended to rule out angina equivalent    Patient's initial troponins negative, BNP mildly elevated    · Check echo as per Cardiology recommendation  · Check stress test as per Cardiology recommendation  · Continue inhalers  · Monitor clinically, ventilatory status

## 2022-07-07 NOTE — ED PROVIDER NOTES
History  Chief Complaint   Patient presents with    Shortness of Breath     Increased SOB that began yest evening, worsening overnight  Hx of COPD  Lorna Borja is a 56-year-old male with past medical history including hypertension, cardiomyopathy, pulmonary embolism on Eliquis, COPD presenting to the emergency department for evaluation with chief complaint of chest pain and shortness of breath  The patient states that he was awoken from sleep due to diaphoresis at which time he had appreciated chest discomfort localized to the substernal region  He describes this sensation as a feeling of pressure/feeling like if he would belch his symptoms would improve  He states that sometimes he will experience a heaviness in his chest upon lying flat  He does not appreciate any pleuritic component with regard to his chest discomfort  He states that with this he had felt short of breath as well and continues with these symptoms  He states that due to his dyspnea he has not attempted ambulate  He denies calf pain or leg swelling  He notes that he has been compliant with Eliquis but did not take his medication this morning  He was COVID positive 1 month ago  He was fully vaccinated and boosted  He admits to current tobacco use  He states that his symptoms feel similar to when he was diagnosed with cardiomyopathy, with concern that his symptoms may have been exacerbated in setting of recent COVID infection  He denies any fevers or chills, cough, congestion  The patient states that he had experienced similar symptoms approximately one week ago noting that symptoms had resolved within one day  He finds that today's episode was significantly worse  He offers no other complaints or concerns at this time        Shortness of Breath  Associated symptoms: chest pain, cough and diaphoresis    Associated symptoms: no rash and no vomiting        Prior to Admission Medications   Prescriptions Last Dose Informant Patient Reported? Taking? Eliquis 5 MG   No No   Sig: TAKE ONE TABLET BY MOUTH TWICE A DAY   albuterol (PROAIR HFA) 90 mcg/act inhaler  Self No No   Sig: Inhale 1 puff every 4 (four) hours as needed for wheezing or shortness of breath   aspirin 81 MG tablet  Self Yes No   Sig: Take 1 tablet by mouth daily   carvedilol (COREG) 3 125 mg tablet   No No   Si tab twice a day   carvedilol (COREG) 3 125 mg tablet   No No   Sig: TAKE ONE TABLET BY MOUTH TWICE A DAY   carvedilol (COREG) 3 125 mg tablet   No No   Sig: TAKE ONE TABLET BY MOUTH TWICE A DAY   carvedilol (COREG) 3 125 mg tablet   No No   Sig: TAKE ONE TABLET BY MOUTH TWICE A DAY   carvedilol (COREG) 3 125 mg tablet   No No   Sig: TAKE ONE TABLET BY MOUTH TWICE A DAY   carvedilol (COREG) 3 125 mg tablet   No No   Sig: TAKE ONE TABLET BY MOUTH TWICE A DAY   fluticasone-salmeterol (ADVAIR DISKUS) 250-50 mcg/dose inhaler  Self Yes No   Sig: Inhale   losartan (COZAAR) 25 mg tablet   No No   Sig: Take 1 tablet (25 mg total) by mouth daily   losartan (COZAAR) 25 mg tablet   No No   Sig: Take 1 tablet (25 mg total) by mouth daily   losartan (COZAAR) 25 mg tablet   No No   Sig: TAKE ONE TABLET BY MOUTH EVERY DAY   losartan (COZAAR) 25 mg tablet   No No   Sig: TAKE ONE TABLET BY MOUTH EVERY DAY **MUST CALL MD FOR APPOINTMENT**   montelukast (SINGULAIR) 10 mg tablet  Self Yes No   Sig: Take by mouth   tiotropium (SPIRIVA HANDIHALER) 18 mcg inhalation capsule  Self Yes No   Sig: Place into inhaler and inhale      Facility-Administered Medications: None       Past Medical History:   Diagnosis Date    Asthma     Cardiomyopathy ([facility] [address] )     COPD [facility]ch[facility] obstructive pulmonary disease) ([facility])     Hypertension        History reviewed  No pertinent surgical history  Family History   Problem Relation Age of Onset    Deep vein thrombosis Father      I have reviewed and agree with the history as documented      E-Cigarette/Vaping    E-Cigarette Use Never User E-Cigarette/Vaping Substances     Social History     Tobacco Use    Smoking status: Current Every Day Smoker     Packs/day: 0 50     Types: Cigarettes    Smokeless tobacco: Never Used   Vaping Use    Vaping Use: Never used   Substance Use Topics    Alcohol use: No    Drug use: No       Review of Systems   Constitutional: Positive for diaphoresis  Respiratory: Positive for cough and shortness of breath  Cardiovascular: Positive for chest pain  Negative for palpitations and leg swelling  Gastrointestinal: Negative for nausea and vomiting  Skin: Negative for pallor and rash  Neurological: Negative for dizziness, weakness, light-headedness and numbness  All other systems reviewed and are negative  Physical Exam  Physical Exam  Vitals and nursing note reviewed  Constitutional:       General: He is not in acute distress  Appearance: Normal appearance  He is well-developed  He is not ill-appearing, toxic-appearing or diaphoretic  HENT:      Head: Normocephalic and atraumatic  Right Ear: External ear normal       Left Ear: External ear normal    Eyes:      Conjunctiva/sclera: Conjunctivae normal    Cardiovascular:      Rate and Rhythm: Normal rate and regular rhythm  Pulses: Normal pulses  Pulmonary:      Effort: Pulmonary effort is normal  No respiratory distress  Breath sounds: Decreased breath sounds and wheezing (Faint expiratory wheeze appreciate on auscultation of the left posterior lung fields) present  No rhonchi or rales  Comments: Borderline tachypnea  Patient speaking in full sentences without appreciable conversational dyspnea  Saturations stable on room air  Chest:      Chest wall: No tenderness  Abdominal:      General: There is no distension  Palpations: Abdomen is soft  Tenderness: There is no abdominal tenderness  Musculoskeletal:         General: Normal range of motion  Cervical back: Normal range of motion and neck supple  Right lower leg: No tenderness  No edema  Left lower leg: No tenderness  No edema  Skin:     General: Skin is warm and dry  Capillary Refill: Capillary refill takes less than 2 seconds  Neurological:      Mental Status: He is alert  Motor: Motor function is intact  No weakness  Gait: Gait is intact  Psychiatric:         Mood and Affect: Mood normal          Vital Signs  ED Triage Vitals [07/07/22 0650]   Temperature Pulse Respirations Blood Pressure SpO2   98 5 °F (36 9 °C) 99 (!) 24 148/79 95 %      Temp Source Heart Rate Source Patient Position - Orthostatic VS BP Location FiO2 (%)   Oral Monitor Lying Right arm --      Pain Score       4           Vitals:    07/07/22 0650   BP: 148/79   Pulse: 99   Patient Position - Orthostatic VS: Lying         Visual Acuity      ED Medications  Medications   ipratropium-albuterol (DUO-NEB) 0 5-2 5 mg/3 mL inhalation solution 3 mL (has no administration in time range)   aspirin chewable tablet 324 mg (has no administration in time range)       Diagnostic Studies  Results Reviewed     Procedure Component Value Units Date/Time    Blood culture #2 [[id number]] Collected: 07/07/22 0836    Lab Status: Preliminary result Specimen: Blood from Arm, Right Updated: 07/07/22 1304     Blood Culture Received in Microbiology Lab  Culture in Progress  HS Troponin I 2hr [[id number]]  (Normal) Collected: 07/07/22 0911    Lab Status: Final result Specimen: Blood from Hand, Left Updated: 07/07/22 0948     hs TnI 2hr 3 ng/L      Delta 2hr hsTnI 1 ng/L     Blood culture #1 [[id number]] Collected: 07/07/22 0911    Lab Status: In process Specimen: Blood from Arm, Left Updated: 07/07/22 0915    Lactic acid [[id number]]  (Normal) Collected: 07/07/22 0836    Lab Status: Final result Specimen: Blood from Arm, Right Updated: 07/07/22 0909     LACTIC ACID 1 2 mmol/L     Narrative:      Result may be elevated if tourniquet was used during collection      HS Troponin I 4hr [[id number]] Lab Status: No result Specimen: Blood     HS Troponin 0hr (reflex protocol) [[id number]]  (Normal) Collected: 07/07/22 0723    Lab Status: Final result Specimen: Blood from Arm, Right Updated: 07/07/22 0753     hs TnI 0hr 2 ng/L     D-Dimer [[id number]]  (Abnormal) Collected: 07/07/22 0723    Lab Status: Final result Specimen: Blood from Arm, Right Updated: 07/07/22 0753     D-Dimer, Quant 2 92 ug/ml FEU     Narrative: In the evaluation for possible pulmonary embolism, in the appropriate (Well's Score of 4 or less) patient, the age adjusted d-dimer cutoff for this patient can be calculated as:    Age x 0 01 (in ug/mL) for Age-adjusted D-dimer exclusion threshold for a patient over 50 years      Lipase [[id number]]  (Abnormal) Collected: 07/07/22 0723    Lab Status: Final result Specimen: Blood from Arm, Right Updated: 07/07/22 0752     Lipase 68 u/L     NT-BNP PRO [[id number]]  (Abnormal) Collected: 07/07/22 0723    Lab Status: Final result Specimen: Blood from Arm, Right Updated: 07/07/22 0752     NT-proBNP 298 pg/mL     Comprehensive metabolic panel [[id number]] Collected: 07/07/22 0723    Lab Status: Final result Specimen: Blood from Arm, Right Updated: 07/07/22 0745     Sodium 138 mmol/L      Potassium 4 6 mmol/L      Chloride 104 mmol/L      CO2 24 mmol/L      ANION GAP 10 mmol/L      BUN 11 mg/dL      Creatinine 1 00 mg/dL      Glucose 114 mg/dL      Calcium 8 9 mg/dL      AST 15 U/L      ALT 20 U/L      Alkaline Phosphatase 67 U/L      Total Protein 7 6 g/dL      Albumin 3 9 g/dL      Total Bilirubin 0 61 mg/dL      eGFR 81 ml/min/1 73sq m     Narrative:      Yehuda guidelines for Chronic Kidney Disease (CKD):     Stage 1 with normal or high GFR (GFR > 90 mL/min/1 73 square meters)    Stage 2 Mild CKD (GFR = 60-89 mL/min/1 73 square meters)    Stage 3A Moderate CKD (GFR = 45-59 mL/min/1 73 square meters)    Stage 3B Moderate CKD (GFR = 30-44 mL/min/1 73 square meters)   Stage 4 Severe CKD (GFR = 15-29 mL/min/1 73 square meters)    Stage 5 End Stage CKD (GFR <15 mL/min/1 73 square meters)  Note: GFR calculation is accurate only with a steady state creatinine    Protime-INR [[id number]]  (Normal) Collected: 07/07/22 0723    Lab Status: Final result Specimen: Blood from Arm, Right Updated: 07/07/22 0741     Protime 13 2 seconds      INR 1 04    APTT [[id number]]  (Normal) Collected: 07/07/22 0723    Lab Status: Final result Specimen: Blood from Arm, Right Updated: 07/07/22 0741     PTT 34 seconds     CBC and differential [[id number]]  (Abnormal) Collected: 07/07/22 0723    Lab Status: Final result Specimen: Blood from Arm, Right Updated: 07/07/22 0728     WBC 22 60 Thousand/uL      RBC 4 63 Million/uL      Hemoglobin 14 6 g/dL      Hematocrit 42 8 %      MCV 92 fL      MCH 31 5 pg      MCHC 34 1 g/dL      RDW 13 2 %      MPV 8 8 fL      Platelets 932 Thousands/uL      nRBC 0 /100 WBCs      Neutrophils Relative 88 %      Immat GRANS % 1 %      Lymphocytes Relative 4 %      Monocytes Relative 5 %      Eosinophils Relative 1 %      Basophils Relative 1 %      Neutrophils Absolute 20 06 Thousands/µL      Immature Grans Absolute 0 14 Thousand/uL      Lymphocytes Absolute 1 00 Thousands/µL      Monocytes Absolute 1 01 Thousand/µL      Eosinophils Absolute 0 23 Thousand/µL      Basophils Absolute 0 16 Thousands/µL                  CTA ED chest PE Study   Final Result by Charu Baeza MD (07/07 0052)      1  No pulmonary embolism   2  Abnormal appearance of the spleen, in addition to typical arterial phase enhancement, this appearance could also be due to infarcts, correlate clinically  I personally discussed this study  with Cecy Serrano on 7/7/2022 at 9:08 AM             Workstation performed: [id number]         XR chest pa & lateral   Final Result by Britton Bonilla MD (07/07 8804)      No acute cardiopulmonary disease                    Workstation performed: [id number] Procedures  ECG 12 Lead Documentation Only    Date/Time: 7/7/2022 6:51 AM  Performed by: Krystyna Whitehead PA-C  Authorized by: Krystyna Whitehead PA-C     Indications / Diagnosis:  Chest pain  Patient location:  ED  Previous ECG:     Previous ECG:  Compared to current  Interpretation:     Interpretation: normal    Rate:     ECG rate:  100  Rhythm:     Rhythm: sinus tachycardia    Ectopy:     Ectopy: none    QRS:     QRS axis:  Normal  Conduction:     Conduction: normal    ST segments:     ST segments:  Normal  T waves:     T waves: normal               ED Course  ED Course as of 07/07/22 1639   Thu Jul 07, 2022   [facility] with Dr Tash Garnett, Cardiology attending on-call, requesting consult and will see patient at bedside   [phone] Spoke with Dedrick Tapia, Vascular Surgery AP  To review CT results with attending Dr Ga Chavez and get back to me   [phone] Dr Tash Garnett, Cardiology, not yet able to see the patient at bedside  Requested update  1520 Case discussed with Dedrick Tapia, Vascular Surgery AP on-call, who relates that clinical presentation does not seem consistent with splenic infarcts  States that as the patient is currently anticoagulated on Eliquis there is no further management for this indeterminate finding  States to me there is no further imaging or intervention is warranted from a vascular standpoint  Agreeable with plan to d/w Cardiology  1540 Patient evaluated at bedside by Dr Tash Garnett, recommending hospital admission for echo/stress test tomorrow  SBIRT 22yo+    Flowsheet Row Most Recent Value   SBIRT (25 yo +)    In order to provide better care to our patients, we are screening all of our patients for alcohol and drug use  Would it be okay to ask you these screening questions?  No Filed at: 07/07/2022 2146                    MDM  Number of Diagnoses or Management Options  Chest pain, unspecified: new and requires workup  Dyspnea: new and requires workup  Diagnosis management comments: This is a 71-year-old male presenting for evaluation with complaint of chest pain and shortness of breath  Was awoken from sleep with diaphoresis, reporting onset of substernal chest discomfort and dyspnea  Symptoms have been constant from time of onset  He reports experiencing a similar episode of symptoms approximately 1 week ago  He was COVID positive 1 month ago, fully vaccinated and posted  He has a history of PE, and has been compliant with Eliquis  Differential diagnosis includes but is not limited to:  Pneumonia, ptx, balta, pe, pleurisy, acs, arrhythmia, chf, effusions, electrolyte derangements, renal impairment, anemia    Initial ED plan:  ECG, lab work, chest x-ray, discussed with Cardiology    Final ED Assessment: Vital signs stable on ED presentation, examination as above  All labs and imaging independently reviewed with imaging interpreted by the Radiologist   ECG without ischemic changes, troponin within normal limits  BNP mildly elevated  D-dimer was elevated and CTA chest ordered which reports no acute pulmonary embolism, and clear lungs  There is mention of abnormal appearance of the spleen, in addition to typical arterial phase enhancement, this appearance could also be due to infarcts, recommending clinical correlation  The patient has no abdominal pain or back pain  Upon administering aspirin and a DuoNeb treatment, the patient had reported feeling significantly improved, and was chest pain-free  I had discussed this case with vascular surgery AP on-call, who relates that symptomatology does not appear consistent with splenic infarcts and furthermore, as patient is currently anticoagulated on Eliquis there would be no change in his management from vascular standpoint  I had also discussed this case with Dr Laurence La, cardiology attending on-call, recommending hospital admission for echo and stress test tomorrow    Test results reviewed with the patient and spouse at bedside as well as my discussion with specialist as above, and the patient is comfortable and agreeable with disposition and care plan  The case was discussed with Dr Ramiro Varela [facility], accepts patient for hospital admission  He had remained hemodynamically stable while being observed in the emergency department today without new or worsening symptoms and is stable for hospital admission  Bridging orders placed  Amount and/or Complexity of Data Reviewed  Clinical lab tests: ordered and reviewed  Tests in the radiology section of CPT®: ordered and reviewed  Review and summarize past medical records: yes  Discuss the patient with other providers: yes (Dr Brandy Edward, Cardiology)  Independent visualization of images, tracings, or specimens: yes    Risk of Complications, Morbidity, and/or Mortality  Presenting problems: moderate  Diagnostic procedures: moderate  Management options: moderate    Patient Progress  Patient progress: stable      Disposition  Final diagnoses:   Dyspnea     Time reflects when diagnosis was documented in both MDM as applicable and the Disposition within this note     Time User Action Codes Description Comment    7/7/2022 10:08 AM Anthony Moy Add [R06 00] Dyspnea       ED Disposition     ED Disposition   Admit    Condition   Stable    Date/Time   Thu Jul 7, 2022  3:29 PM    Comment   Case was discussed with Dr Ramiro Varela and the patient's admission status was agreed to be Admission Status: observation status to the service of Dr Ramiro Varela   Follow-up Information    None         Patient's Medications   Discharge Prescriptions    No medications on file       No discharge procedures on file      PDMP Review     None          ED Provider  Electronically Signed by           Nicolás Flaherty PA-C  07/07/22 4306

## 2022-07-08 ENCOUNTER — APPOINTMENT (OUTPATIENT)
Dept: NUCLEAR MEDICINE | Facility: HOSPITAL | Age: 62
End: 2022-07-08
Payer: COMMERCIAL

## 2022-07-08 ENCOUNTER — APPOINTMENT (OUTPATIENT)
Dept: NON INVASIVE DIAGNOSTICS | Facility: HOSPITAL | Age: 62
End: 2022-07-08
Payer: COMMERCIAL

## 2022-07-08 ENCOUNTER — TELEPHONE (OUTPATIENT)
Dept: CARDIOLOGY CLINIC | Facility: CLINIC | Age: 62
End: 2022-07-08

## 2022-07-08 LAB
ANION GAP SERPL CALCULATED.3IONS-SCNC: 7 MMOL/L (ref 4–13)
AORTIC ROOT: 3.6 CM
APICAL FOUR CHAMBER EJECTION FRACTION: 60 %
ASCENDING AORTA: 3.2 CM
BACTERIA UR QL AUTO: ABNORMAL /HPF
BASOPHILS # BLD AUTO: 0.14 THOUSANDS/ΜL (ref 0–0.1)
BASOPHILS NFR BLD AUTO: 1 % (ref 0–1)
BILIRUB UR QL STRIP: NEGATIVE
BUN SERPL-MCNC: 16 MG/DL (ref 5–25)
CALCIUM SERPL-MCNC: 8.6 MG/DL (ref 8.3–10.1)
CHEST PAIN STATEMENT: NORMAL
CHLORIDE SERPL-SCNC: 102 MMOL/L (ref 100–108)
CLARITY UR: CLEAR
CO2 SERPL-SCNC: 26 MMOL/L (ref 21–32)
COLOR UR: YELLOW
CREAT SERPL-MCNC: 0.85 MG/DL (ref 0.6–1.3)
E WAVE DECELERATION TIME: 481 MS
EOSINOPHIL # BLD AUTO: 0.41 THOUSAND/ΜL (ref 0–0.61)
EOSINOPHIL NFR BLD AUTO: 3 % (ref 0–6)
ERYTHROCYTE [DISTWIDTH] IN BLOOD BY AUTOMATED COUNT: 13.2 % (ref 11.6–15.1)
FRACTIONAL SHORTENING: 37 % (ref 28–44)
GFR SERPL CREATININE-BSD FRML MDRD: 94 ML/MIN/1.73SQ M
GLUCOSE SERPL-MCNC: 115 MG/DL (ref 65–140)
GLUCOSE UR STRIP-MCNC: NEGATIVE MG/DL
HCT VFR BLD AUTO: 38.1 % (ref 36.5–49.3)
HGB BLD-MCNC: 13 G/DL (ref 12–17)
HGB UR QL STRIP.AUTO: NEGATIVE
IMM GRANULOCYTES # BLD AUTO: 0.07 THOUSAND/UL (ref 0–0.2)
IMM GRANULOCYTES NFR BLD AUTO: 1 % (ref 0–2)
INTERVENTRICULAR SEPTUM IN DIASTOLE (PARASTERNAL SHORT AXIS VIEW): 1 CM
INTERVENTRICULAR SEPTUM: 1 CM (ref 0.6–1.1)
KETONES UR STRIP-MCNC: NEGATIVE MG/DL
LAAS-AP2: 19.5 CM2
LAAS-AP4: 13 CM2
LEFT ATRIUM AREA SYSTOLE SINGLE PLANE A4C: 14 CM2
LEFT ATRIUM SIZE: 2.3 CM
LEFT INTERNAL DIMENSION IN SYSTOLE: 3.2 CM (ref 2.1–4)
LEFT VENTRICULAR INTERNAL DIMENSION IN DIASTOLE: 5.1 CM (ref 3.5–6)
LEFT VENTRICULAR POSTERIOR WALL IN END DIASTOLE: 1 CM
LEFT VENTRICULAR STROKE VOLUME: 81 ML
LEUKOCYTE ESTERASE UR QL STRIP: NEGATIVE
LVSV (TEICH): 81 ML
LYMPHOCYTES # BLD AUTO: 1.97 THOUSANDS/ΜL (ref 0.6–4.47)
LYMPHOCYTES NFR BLD AUTO: 15 % (ref 14–44)
MAX DIASTOLIC BP: 78 MMHG
MAX HEART RATE: 125 BPM
MAX HR: 125 BPM
MAX PREDICTED HEART RATE: 160 BPM
MAX. SYSTOLIC BP: 144 MMHG
MCH RBC QN AUTO: 31.6 PG (ref 26.8–34.3)
MCHC RBC AUTO-ENTMCNC: 34.1 G/DL (ref 31.4–37.4)
MCV RBC AUTO: 93 FL (ref 82–98)
MONOCYTES # BLD AUTO: 0.9 THOUSAND/ΜL (ref 0.17–1.22)
MONOCYTES NFR BLD AUTO: 7 % (ref 4–12)
MV E'TISSUE VEL-SEP: 10 CM/S
MV PEAK A VEL: 0.58 M/S
MV PEAK E VEL: 40 CM/S
MV STENOSIS PRESSURE HALF TIME: 139 MS
MV VALVE AREA P 1/2 METHOD: 1.58 CM2
NEUTROPHILS # BLD AUTO: 10.13 THOUSANDS/ΜL (ref 1.85–7.62)
NEUTS SEG NFR BLD AUTO: 73 % (ref 43–75)
NITRITE UR QL STRIP: NEGATIVE
NON-SQ EPI CELLS URNS QL MICRO: ABNORMAL /HPF
NRBC BLD AUTO-RTO: 0 /100 WBCS
NUC STRESS EJECTION FRACTION: 57 %
PH UR STRIP.AUTO: 6.5 [PH]
PLATELET # BLD AUTO: 347 THOUSANDS/UL (ref 149–390)
PMV BLD AUTO: 9.3 FL (ref 8.9–12.7)
POTASSIUM SERPL-SCNC: 3.9 MMOL/L (ref 3.5–5.3)
PROT UR STRIP-MCNC: NEGATIVE MG/DL
PROTOCOL NAME: NORMAL
RATE PRESSURE PRODUCT: NORMAL
RBC # BLD AUTO: 4.12 MILLION/UL (ref 3.88–5.62)
RBC #/AREA URNS AUTO: ABNORMAL /HPF
REASON FOR TERMINATION: NORMAL
RIGHT ATRIUM AREA SYSTOLE A4C: 11.6 CM2
RIGHT VENTRICLE ID DIMENSION: 3.4 CM
SL CV LEFT ATRIUM LENGTH A2C: 5.2 CM
SL CV LV EF: 60
SL CV PED ECHO LEFT VENTRICLE DIASTOLIC VOLUME (MOD BIPLANE) 2D: 123 ML
SL CV PED ECHO LEFT VENTRICLE SYSTOLIC VOLUME (MOD BIPLANE) 2D: 42 ML
SL CV REST NUCLEAR ISOTOPE DOSE: 11 MCI
SL CV STRESS NUCLEAR ISOTOPE DOSE: 32.4 MCI
SL CV STRESS RECOVERY BP: NORMAL MMHG
SL CV STRESS RECOVERY HR: 90 BPM
SL CV STRESS RECOVERY O2 SAT: 94 %
SODIUM SERPL-SCNC: 135 MMOL/L (ref 136–145)
SP GR UR STRIP.AUTO: 1.01 (ref 1–1.03)
STRESS ANGINA INDEX: 0
STRESS BASELINE BP: NORMAL MMHG
STRESS BASELINE HR: 66 BPM
STRESS O2 SAT REST: 94 %
STRESS PEAK HR: 118 BPM
STRESS POST O2 SAT PEAK: 92 %
STRESS POST PEAK BP: 128 MMHG
STRESS/REST PERFUSION RATIO: 1.12
TARGET HR FORMULA: NORMAL
TEST INDICATION: NORMAL
TIME IN EXERCISE PHASE: NORMAL
UROBILINOGEN UR QL STRIP.AUTO: 0.2 E.U./DL
WBC # BLD AUTO: 13.62 THOUSAND/UL (ref 4.31–10.16)
WBC #/AREA URNS AUTO: ABNORMAL /HPF

## 2022-07-08 PROCEDURE — 85025 COMPLETE CBC W/AUTO DIFF WBC: CPT | Performed by: INTERNAL MEDICINE

## 2022-07-08 PROCEDURE — 78452 HT MUSCLE IMAGE SPECT MULT: CPT

## 2022-07-08 PROCEDURE — 97165 OT EVAL LOW COMPLEX 30 MIN: CPT

## 2022-07-08 PROCEDURE — 80048 BASIC METABOLIC PNL TOTAL CA: CPT | Performed by: INTERNAL MEDICINE

## 2022-07-08 PROCEDURE — G1004 CDSM NDSC: HCPCS

## 2022-07-08 PROCEDURE — 97161 PT EVAL LOW COMPLEX 20 MIN: CPT

## 2022-07-08 PROCEDURE — 93016 CV STRESS TEST SUPVJ ONLY: CPT | Performed by: INTERNAL MEDICINE

## 2022-07-08 PROCEDURE — 99214 OFFICE O/P EST MOD 30 MIN: CPT | Performed by: INTERNAL MEDICINE

## 2022-07-08 PROCEDURE — 93306 TTE W/DOPPLER COMPLETE: CPT

## 2022-07-08 PROCEDURE — 93306 TTE W/DOPPLER COMPLETE: CPT | Performed by: INTERNAL MEDICINE

## 2022-07-08 PROCEDURE — 93018 CV STRESS TEST I&R ONLY: CPT | Performed by: INTERNAL MEDICINE

## 2022-07-08 PROCEDURE — 81001 URINALYSIS AUTO W/SCOPE: CPT | Performed by: INTERNAL MEDICINE

## 2022-07-08 PROCEDURE — A9502 TC99M TETROFOSMIN: HCPCS

## 2022-07-08 PROCEDURE — 78452 HT MUSCLE IMAGE SPECT MULT: CPT | Performed by: INTERNAL MEDICINE

## 2022-07-08 PROCEDURE — 99225 PR SBSQ OBSERVATION CARE/DAY 25 MINUTES: CPT | Performed by: INTERNAL MEDICINE

## 2022-07-08 PROCEDURE — 93017 CV STRESS TEST TRACING ONLY: CPT

## 2022-07-08 RX ADMIN — MONTELUKAST 10 MG: 10 TABLET, FILM COATED ORAL at 09:12

## 2022-07-08 RX ADMIN — APIXABAN 5 MG: 5 TABLET, FILM COATED ORAL at 09:12

## 2022-07-08 RX ADMIN — ASPIRIN 81 MG: 81 TABLET, CHEWABLE ORAL at 09:12

## 2022-07-08 RX ADMIN — UMECLIDINIUM 1 PUFF: 62.5 AEROSOL, POWDER ORAL at 09:12

## 2022-07-08 RX ADMIN — FLUTICASONE FUROATE AND VILANTEROL TRIFENATATE 1 PUFF: 100; 25 POWDER RESPIRATORY (INHALATION) at 09:12

## 2022-07-08 RX ADMIN — APIXABAN 5 MG: 5 TABLET, FILM COATED ORAL at 17:09

## 2022-07-08 RX ADMIN — LOSARTAN POTASSIUM 25 MG: 25 TABLET, FILM COATED ORAL at 09:12

## 2022-07-08 RX ADMIN — REGADENOSON 0.4 MG: 0.08 INJECTION, SOLUTION INTRAVENOUS at 11:22

## 2022-07-08 RX ADMIN — CARVEDILOL 3.12 MG: 3.12 TABLET, FILM COATED ORAL at 09:12

## 2022-07-08 RX ADMIN — CARVEDILOL 3.12 MG: 3.12 TABLET, FILM COATED ORAL at 17:09

## 2022-07-08 NOTE — ED NOTES
Received pt awake alert no distress  Pt requested  Nightly breathing meds      Court DEB Rojas  07/08/22 7148

## 2022-07-08 NOTE — ASSESSMENT & PLAN NOTE
· Patient noted to have elevated [place] [id number] on presentation  · Unclear etiology  · Significant downtrending white count at this time  · Patient denies any fever, chill, nausea or vomiting  · Follow-up blood cultures  · Follow-up UA  · Continue to monitor for any signs of worsening infection

## 2022-07-08 NOTE — UTILIZATION REVIEW
Initial Clinical Review    Admission: Date/Time/Statement:   Admission Orders (From admission, onward)     Ordered        07/07/22 1534  Place in Observation  Once                      Orders Placed This Encounter   Procedures    Place in Observation     Standing Status:   Standing     Number of Occurrences:   1     Order Specific Question:   Level of Care     Answer:   Med Surg [16]     ED Arrival Information     Expected   -    Arrival   7/7/2022 06:40    Acuity   Urgent            Means of arrival   Wheelchair    Escorted by   Family Member    Service   Hospitalist    Admission type   Urgent            Arrival complaint   SOB           Chief Complaint   Patient presents with    Shortness of Breath     Increased SOB that began yest evening, worsening overnight  Hx of COPD  Initial Presentation: 62 y o  male to ED from home w/ SOB , sudden onset   Hx COPD   In ED found to have wbc 22k , CTA neg for PE   Admitted OBS status w/ dyspnea plan for echo , stress test , cardiology consult , inhalers   HTN cont losartan and carvedilol   7/7  Cardiology Consult   SOB , possible anginal equivalent   CTA neg for PE , check TTE and MPI            ED Triage Vitals [07/07/22 0650]   Temperature Pulse Respirations Blood Pressure SpO2   98 5 °F (36 9 °C) 99 (!) 24 148/79 95 %      Temp Source Heart Rate Source Patient Position - Orthostatic VS BP Location FiO2 (%)   Oral Monitor Lying Right arm --      Pain Score       4          Wt Readings from Last 1 Encounters:   07/07/22 68 9 kg (151 lb 14 4 oz)     Additional Vital Signs:   07/08/22 08:07:02 97 8 °F (36 6 °C) 65 17 114/65 81 94 % -- --   07/08/22 02:19:12 98 1 °F (36 7 °C) 67 -- 156/125 Abnormal  135 96 % -- --   07/08/22 0200 -- 63 18 108/59 79 93 % -- --   07/08/22 0000 -- 80 27 Abnormal  108/70 85 93 % -- --   07/07/22 2230 -- 71 21 137/79 103 93 % -- --   07/07/22 2200 -- 70 19 127/69 93 94 % -- Lying   07/07/22 2130 -- 75 19 118/68 86 95 % -- Lying 07/07/22 2030 -- 75 20 118/67 87 93 % None (Room air) Lying   07/07/22 2000 -- 79 20 121/69 90 96 % None (Room air) Lying   07/07/22 1740 -- 78 -- 139/66 -- -- -- --   07/07/22 1700 -- 75 17 147/74 104 97 % None (Room air) Lying   07/07/22 1245 -- 77 17 132/75 98 94 % None (Room air) --   07/07/22 0912 -- 91 20 136/75 -- 95 % None (Room air) Sitting   07/07/22 0812 -- -- -- -- -- -- None (Room air)        Pertinent Labs/Diagnostic Test Results:   CTA ED chest PE Study   Final Result by Lacho Ann MD (07/07 5418)      1  No pulmonary embolism   2  Abnormal appearance of the spleen, in addition to typical arterial phase enhancement, this appearance could also be due to infarcts, correlate clinically  I personally discussed this study  with Radames Celaya on 7/7/2022 at 9:08 AM             Workstation performed: [id number]         XR chest pa & lateral   Final Result by Clarissa Chopra MD (07/07 7841)      No acute cardiopulmonary disease                    Workstation performed: [id number]               Results from last 7 days   Lab Units 07/08/22 0519 07/07/22  0723   WBC Thousand/uL 13 62* 22 60*   HEMOGLOBIN g/dL 13 0 14 6   HEMATOCRIT % 38 1 42 8   PLATELETS Thousands/uL 347 392*   NEUTROS ABS Thousands/µL 10 13* 20 06*     Results from last 7 days   Lab Units 07/08/22  0519 07/07/22  0723   SODIUM mmol/L 135* 138   POTASSIUM mmol/L 3 9 4 6   CHLORIDE mmol/L 102 104   CO2 mmol/L 26 24   ANION GAP mmol/L 7 10   BUN mg/dL 16 11   CREATININE mg/dL 0 85 1 00   EGFR ml/min/1 73sq m 94 81   CALCIUM mg/dL 8 6 8 9     Results from last 7 days   Lab Units 07/07/22  0723   AST U/L 15   ALT U/L 20   ALK PHOS U/L 67   TOTAL PROTEIN g/dL 7 6   ALBUMIN g/dL 3 9   TOTAL BILIRUBIN mg/dL 0 61     Results from last 7 days   Lab Units 07/08/22  0519 07/07/22  0723   GLUCOSE RANDOM mg/dL 115 114     Results from last 7 days   Lab Units 07/07/22  0911 07/07/22  0723   HS TNI 0HR ng/L  --  2   HS TNI 2HR ng/L 3 --    HSTNI D2 ng/L 1  --      Results from last 7 days   Lab Units 07/07/22  0723   D-DIMER QUANTITATIVE ug/ml FEU 2 92*     Results from last 7 days   Lab Units 07/07/22  0723   PROTIME seconds 13 2   INR  1 04   PTT seconds 34     Results from last 7 days   Lab Units 07/07/22  0836   LACTIC ACID mmol/L 1 2     Results from last 7 days   Lab Units 07/07/22  0723   NT-PRO BNP pg/mL 298*       Results from last 7 days   Lab Units 07/07/22  0723   LIPASE u/L 68*       Results from last 7 days   Lab Units 07/07/22  0911 07/07/22  0836   BLOOD CULTURE  Received in Microbiology Lab  Culture in Progress  Received in Microbiology Lab  Culture in Progress       ED Treatment:   Medication Administration from 07/07/2022 0640 to 07/08/2022 1681       Date/Time Order Dose Route Action     07/07/2022 0741 ipratropium-albuterol (DUO-NEB) 0 5-2 5 mg/3 mL inhalation solution 3 mL 3 mL Nebulization Given     07/07/2022 0741 aspirin chewable tablet 324 mg 324 mg Oral Given     07/07/2022 2316 umeclidinium bromide (INCRUSE ELLIPTA) 62 5 mcg/inh inhaler AEPB 1 puff 1 puff Inhalation Given     07/07/2022 2316 fluticasone-vilanterol (BREO ELLIPTA) 100-25 mcg/inh inhaler 1 puff 1 puff Inhalation Given     07/07/2022 1740 apixaban (ELIQUIS) tablet 5 mg 5 mg Oral Given     07/07/2022 1740 carvedilol (COREG) tablet 3 125 mg 3 125 mg Oral Given        Past Medical History:   Diagnosis Date    Asthma     Cardiomyopathy ([facility] [address] )     COPD [facility]chronic obstructive pulmonary disease) ([facility] )     Hypertension      Present on Admission:   Hypertension, essential   Pulmonary emboli (HCC)   Tobacco abuse   Dyspnea   Abnormal CT scan   Leukocytosis      Admitting Diagnosis: Chest pain, unspecified [R07 9]  Dyspnea [R06 00]  SOB (shortness of breath) [R06 02]  Abnormal finding on CT scan [R93 89]  Age/Sex: 62 y o  male  Admission Orders:  Scheduled Medications:  apixaban, 5 mg, Oral, BID  aspirin, 81 mg, Oral, Daily  carvedilol, 3 125 mg, Oral, BID With Meals  fluticasone-vilanterol, 1 puff, Inhalation, Daily  losartan, 25 mg, Oral, Daily  montelukast, 10 mg, Oral, Daily  nicotine, 1 patch, Transdermal, Daily  umeclidinium bromide, 1 puff, Inhalation, Daily      Continuous IV Infusions:     PRN Meds:  acetaminophen, 650 mg, Oral, Q6H PRN  albuterol, 1 puff, Inhalation, Q4H PRN  ondansetron, 4 mg, Intravenous, Q6H PRN        IP CONSULT TO CARDIOLOGY    Network Utilization Review Department  ATTENTION: Please call with any questions or concerns to [phone] and carefully listen to the prompts so that you are directed to the right person  All voicemails are confidential   Ty Limb all requests for admission clinical reviews, approved or denied determinations and any other requests to dedicated fax number below belonging to the campus where the patient is receiving treatment   List of dedicated fax numbers for the Facilities:  [address] DENIALS (Administrative/Medical Necessity) [phone]   [address] (Maternity/NICU/Pediatrics) [phone]   [address]  [address] [facility] [place] [address] [address] [place] [address] Jack Arzola [address] [facility] [facility] [phone]

## 2022-07-08 NOTE — PHYSICAL THERAPY NOTE
Physical Therapy Evaluation     Patient's Name: Sonya Dye    Admitting Diagnosis  Chest pain, unspecified [R07 9]  Dyspnea [R06 00]  SOB (shortness of breath) [R06 02]  Abnormal finding on CT scan [R93 89]    Problem List  Patient Active Problem List   Diagnosis    Hypertension, essential    Asthma    Tobacco abuse    Dyspnea    Pulmonary emboli (HCC)    Abnormal CT scan    Leukocytosis       Past Medical History  Past Medical History:   Diagnosis Date    Asthma     Cardiomyopathy ([facility] [address] )     COPD (chronic obstructive pulmonary disease) ([facility] )     Hypertension        Past Surgical History  History reviewed  No pertinent surgical history  07/08/22 1201   PT Last Visit   PT Visit Date 07/08/22   Note Type   Note type Evaluation   Pain Assessment   Pain Assessment Tool 0-10   Pain Score No Pain   Restrictions/Precautions   Weight Bearing Precautions Per Order No   Braces or Orthoses Other (Comment)  (compression sleeves at work)   Home Living   Type of [address] [place] [address] Two level; Able to live on main level with bedroom/bathroom  (no ALBA)   Bathroom Shower/Tub Tub/shower unit   Bathroom Toilet Standard   Bathroom Equipment Other (Comment)  (none at baseline)   [address] Other (Comment)  (none per patient)   Prior Function   Level of [place] Independent with ADLs and functional mobility   Lives With Spouse   Receives Help From Family   ADL Assistance Independent   IADLs Independent   Vocational Full time employment   Comments (+) drives   General   Family/Caregiver Present No   Cognition   Overall Cognitive Status WFL   Arousal/Participation Alert   Orientation Level Oriented X4   Memory Within functional limits   Following Commands Follows all commands and directions without difficulty   Comments Pt agreeable to participate in PT evaluation   RLE Assessment   RLE Assessment WFL   LLE Assessment   LLE Assessment WFL   Light Touch   RLE Light Touch Grossly intact LLE Light Touch Grossly intact   Bed Mobility   Supine to Sit 7  Independent   Sit to Supine 7  Independent   Additional Comments Pt received at start of session supine in bed with HOB elevated  Transfers   Sit to Stand 7  Independent   Stand to Sit 7  Independent   Additional Comments no device   Ambulation/Elevation   Gait pattern WNL   Gait Assistance 7  Independent   Assistive Device None   Distance 200'   Stair Management Assistance 6  Modified independent   Stair Management Technique One rail R   Number of Stairs 12   Ramp Technique No rails;R rail   Balance   Static Sitting Normal   Dynamic Sitting Normal   Static Standing Normal   Dynamic Standing Normal   Ambulatory Normal   Activity Tolerance   Activity Tolerance Patient tolerated treatment well   Assessment   Prognosis Excellent   Assessment Pt is 62 y o  male seen for PT evaluation s/p admit to [facility] on 7/7/2022 w/ Dyspnea  PT consulted to assess pt's functional mobility and d/c needs  Order placed for PT eval and tx, w/ up w/ A order  Comorbidities affecting pt's physical performance at time of assessment include: leukocytosis, PE, HTN, dyspnea  PTA, pt was independent w/ all functional mobility w/ no device, ambulates community distances and elevations, ambulates household distances, has 0 ALBA, lives w/ spouse in two level house and works full time  Personal factors affecting pt at time of IE include: lives in 2 story house  Please find objective findings from PT assessment regarding body systems outlined above  The following objective measures performed on IE: Barthel Index: 100/100, Modified [place]: 1 (no significant disability) and AM-PAC 6-Clicks: 16/82  Pt's clinical presentation is currently stable  seen in pt's presentation of continued need for medical management and monitoring   From PT/mobility standpoint, pt appears to be functioning close to or at mobility baseline, therefore no further immediate skilled PT needs are warranted at this time  Pt currently performing all phases of functional mobility at independent level without need for AD  Recommend pt continue to mobilize ad amy while here  Recommend pt return to previous living environment once medically cleared  Will sign off, D/C PT  Please reconsult if further immediate skilled PT needs are warranted     Barriers to Discharge None   Goals   Patient Goals None stated   Recommendation   PT Discharge Recommendation No rehabilitation needs   AM-PAC Basic Mobility Inpatient   Turning in Bed Without Bedrails 4   Lying on Back to Sitting on Edge of Flat Bed 4   Moving Bed to Chair 4   Standing Up From Chair 4   Walk in Room 4   Climb 3-5 Stairs 4   Basic Mobility Inpatient Raw Score 24   Basic Mobility Standardized Score 57 68   Highest Level Of Mobility   -HL Goal 8: Walk 250 feet or more   JH-HLM Achieved 7: Walk 25 feet or more   Modified [place] Scale   Modified [place] Scale 1   Barthel Index   Feeding 10   Bathing 5   Grooming Score 5   Dressing Score 10   Bladder Score 10   Bowels Score 10   Toilet Use Score 10   Transfers (Bed/Chair) Score 15   Mobility (Level Surface) Score 15   Stairs Score 10   Barthel Index Score 100       Yuliana Silva, PT

## 2022-07-08 NOTE — ASSESSMENT & PLAN NOTE
· This is a 26-year-old male with a history of COPD and a previous PE who presented with sudden onset of shortness of breath which has now resolved    He also endorses a history of cardiomyopathy  · Patient states that shortness of breath has resolved at this time  · CTA PE study negative for any acute pulmonary embolism  · Echocardiogram read pending  · Stress test read pending  · Cardiology following  · Continue inhalers  · Monitor clinically, ventilatory status

## 2022-07-08 NOTE — TELEPHONE ENCOUNTER
----- Message from NATE Rodriguez sent at 7/8/2022  4:02 PM EDT -----  Regarding: Hospital Follow-up  Cardiology Follow-up:    Patient clinical visit in 2-3 month at the Cardio location: [place] office. .    Schedule visit with Cardio [facility] Providers: La Owens.    Type of Visit: VISIT TYPE: in-person office visit.    Test ordered: .    Additional Details:

## 2022-07-08 NOTE — OCCUPATIONAL THERAPY NOTE
Occupational Therapy Evaluation        Patient Name: Dio Nair  XOHHG'C Date: 7/8/2022 07/08/22 1155   OT Last Visit   OT Visit Date 07/08/22   Note Type   Note type Evaluation   Restrictions/Precautions   Weight Bearing Precautions Per Order No   Braces or Orthoses Other (Comment)  (compression sleeves at work)   Pain Assessment   Pain Assessment Tool 0-10   Pain Score No Pain   Home Living   Type of [address] [facility] Two level; Able to live on main level with bedroom/bathroom  (no ALBA)   Bathroom Shower/Tub Tub/shower unit   Bathroom Toilet Standard   Bathroom Equipment Other (Comment)  (none at baseline)   [address] Other (Comment)  (none at baseline)   Prior Function   Level of [place] Independent with ADLs and functional mobility   Lives With Spouse   Receives Help From Family   ADL Assistance Independent   IADLs Independent   Vocational Full time employment   Comments patient drives   Lifestyle   Autonomy patient is independent with ADLs/ IADLs, ambulates with no AD, drives and works full time at [employer]     Reciprocal Relationships Supportive family   Service to [employer] for [address] enjoys working out/ lifting weights   Psychosocial   Psychosocial (WDL) WDL   ADL   Eating Assistance 7  Independent   Grooming Assistance 7  [address] 7  Independent   [address]  [address]   Supine to Sit 7  Independent   Sit to Supine 7  Independent   Transfers   Sit to Stand 7  Independent   Stand to Sit 7  Independent   Functional Mobility   Functional Mobility 7  Independent   Additional items   (no AD)   Balance   Static Sitting Normal   Dynamic Sitting Normal   Static Standing Normal   Dynamic Standing Normal   Ambulatory Normal   Activity Tolerance   Activity Tolerance Patient tolerated treatment well   RUE Assessment   RUE Assessment WFL   LUE Assessment   LUE Assessment WFL   Hand Function   Gross Motor Coordination Functional   Sensation   Light Touch No apparent deficits  (BUEs)   Vision-Basic Assessment   Current Vision Wears glasses only for reading   Patient Visual Report Other (Comment)  (No significant changes reported)   Perception   Inattention/Neglect Appears intact   Cognition   Overall Cognitive Status WFL   Arousal/Participation Alert; Cooperative;Responsive   Attention Within functional limits   Orientation Level Oriented X4   Memory Within functional limits   Following Commands Follows all commands and directions without difficulty   Assessment   Assessment Patient is a 62 y o  male seen for OT evaluation s/p admit to [address] [place] [address] on 7/7/2022 w/Dyspnea  Commorbidities affecting patient's functional performance at time of assessment include: Abnormal CT Scan, Leukocytosis, HTN, h/o of PE  Patient presented to ED with sudden onset of SOB  Orders placed for OT evaluation and treatment  Performed at least two patient identifiers during session including name and wristband  Prior to admission, patient is independent with ADLs/ IADLs, ambulates with no AD, drives and works full time at [employer]  Upon evaluation, patient requires independent assist for UB ADLs, independent assist for LB ADLs, transfers and functional ambulation in room and bathroom with independent assist, with no AD  Presents with functional use of BUEs, with intact prehension, coordination and symmetrical muscle strength  Patient appears to be functioning at baseline  No further acute OT needs identified at this time to warrant continuation of services  D/C OT services  From OT standpoint, recommendation at time of d/c would be Home with family support     Recommendation   OT Discharge Recommendation No rehabilitation needs   AM-PAC Daily Activity Inpatient   Lower Body Dressing 4   Bathing 4   Toileting 4   Upper Body Dressing 4   Grooming 4   Eating 4   Daily Activity Raw Score 24   Daily Activity Standardized Score (Calc for Raw Score >=11) 57 54   AM-PAC Applied Cognition Inpatient   Following a Speech/Presentation 4   Understanding Ordinary Conversation 4   Taking Medications 4   Remembering Where Things Are Placed or Put Away 4   Remembering List of 4-5 Errands 4   Taking Care of Complicated Tasks 4   Applied Cognition Raw Score 24   Applied Cognition Standardized Score 62 21   Barthel Index   Feeding 10   Bathing 5   Grooming Score 5   Dressing Score 10   Bladder Score 10   Bowels Score 10   Toilet Use Score 10   Transfers (Bed/Chair) Score 15   Mobility (Level Surface) Score 15   Stairs Score 0   Barthel Index Score 90

## 2022-07-08 NOTE — PROGRESS NOTES
Cardiology Progress Note - Kaylan Hall 62 y o  male MRN: [id number]    Unit/Bed#: -01 Encounter: [id number]      Assessment/Plan:  1  Dyspnea  · Patient presented with acute onset shortness of breath  · Patient denies any episodes of dyspnea overnight  · HS troponin negative  · Pharmacologic MPI negative for ischemia  · TTE done- EF 60%,No RWMA    2  Reported history of CM in 2018  · TTE results pending    3  Hypertension  · Continue Coreg 3 125 mg b i d  And losartan 25 mg daily    4  Hyperlipidemia  · Patient not on statin    5  Hx of PE on Eliquis    6  COPD    7  Tobacco use  · Cessation advised    8  Abnormal CT  · Management per primary team     Will see as needed  Please reach out with any questions or concerns  Will arrange outpatient follow-up  Subjective:   Patient seen and examined  No significant events overnight  Patient denies any episodes of dyspnea overnight  The patient denies any chest pain or lower extremity edema  Objective:     Vitals: Blood pressure 135/78, pulse 65, temperature 97 9 °F (36 6 °C), resp  rate 17, height 5' 9" (1 753 m), weight 68 5 kg (151 lb), SpO2 97 %  , Body mass index is 22 3 kg/m² ,   Orthostatic Blood Pressures    Flowsheet Row Most Recent Value   Blood Pressure 135/78 filed at 07/08/2022 1245   Patient Position - Orthostatic VS Lying filed at 07/07/2022 2200            Intake/Output Summary (Last 24 hours) at 7/8/2022 1517  Last data filed at 7/8/2022 1013  Gross per 24 hour   Intake 200 ml   Output 0 ml   Net 200 ml         Physical Exam:  Physical Exam  Vitals and nursing note reviewed  Constitutional:       Appearance: He is well-developed  HENT:      Head: Normocephalic and atraumatic  Eyes:      Conjunctiva/sclera: Conjunctivae normal    Cardiovascular:      Rate and Rhythm: Normal rate and regular rhythm  Heart sounds: Normal heart sounds  No murmur heard  Pulmonary:      Effort: Pulmonary effort is normal  No respiratory distress  Breath sounds: Normal breath sounds  Abdominal:      Palpations: Abdomen is soft  Tenderness: There is no abdominal tenderness  Musculoskeletal:      Cervical back: Neck supple  Right lower leg: No edema  Left lower leg: No edema  Skin:     General: Skin is warm and dry  Neurological:      Mental Status: He is alert and oriented to person, place, and time     Psychiatric:         Mood and Affect: Mood normal          Behavior: Behavior normal               Medications:      Current Facility-Administered Medications:     acetaminophen (TYLENOL) tablet 650 mg, 650 mg, Oral, Q6H PRN, Adele Castro MD    albuterol (PROVENTIL HFA,VENTOLIN HFA) inhaler 1 puff, 1 puff, Inhalation, Q4H PRN, Adele Castro MD    [facility]) tablet 5 mg, 5 mg, Oral, BID, Adele Castro MD, 5 mg at 07/08/22 1196    aspirin chewable tablet 81 mg, 81 mg, Oral, Daily, Adele Castro MD, 81 mg at 07/08/22 0912    carvedilol (COREG) tablet 3 125 mg, 3 125 mg, Oral, BID With Meals, Adele Castro MD, 3 125 mg at 07/08/22 0912    fluticasone-vilanterol (BREO ELLIPTA) 100-25 mcg/inh inhaler 1 puff, 1 puff, Inhalation, Daily, Adele Castro MD, 1 puff at 07/08/22 0912    losartan (COZAAR) tablet 25 mg, 25 mg, Oral, Daily, Adele Castro MD, 25 mg at 07/08/22 0912    montelukast (SINGULAIR) tablet 10 mg, 10 mg, Oral, Daily, Adele Castro MD, 10 mg at 07/08/22 0912    nicotine (NICODERM CQ) 21 mg/24 hr TD 24 hr patch 1 patch, 1 patch, Transdermal, Daily, Adele Castro MD    ondansetron [facility]) injection 4 mg, 4 mg, Intravenous, Q6H PRN, Adele Castro MD    umeclidinium bromide (INCRUSE ELLIPTA) 62 5 mcg/inh inhaler AEPB 1 puff, 1 puff, Inhalation, Daily, Adele Castro MD, 1 puff at 07/08/22 0912     Labs & Results:     Results from last 7 days   Lab Units 07/07/22  0911 07/07/22  0723   HS TNI 0HR ng/L  --  2   HS TNI 2HR ng/L 3  --    HSTNI D2 ng/L 1  --    NT-PRO BNP pg/mL  --  298*     Results from last 7 days   Lab Units 07/08/22  0519 07/07/22 0723   WBC Thousand/uL 13 62* 22 60*   HEMOGLOBIN g/dL 13 0 14 6   HEMATOCRIT % 38 1 42 8   PLATELETS Thousands/uL 347 392*         Results from last 7 days   Lab Units 07/08/22  0519 07/07/22 0723   POTASSIUM mmol/L 3 9 4 6   CHLORIDE mmol/L 102 104   CO2 mmol/L 26 24   BUN mg/dL 16 11   CREATININE mg/dL 0 85 1 00   CALCIUM mg/dL 8 6 8 9   ALK PHOS U/L  --  67   ALT U/L  --  20   AST U/L  --  15     Results from last 7 days   Lab Units 07/07/22  0723   INR  1 04   PTT seconds 34           Vitals: Blood pressure 135/78, pulse 65, temperature 97 9 °F (36 6 °C), resp  rate 17, height 5' 9" (1 753 m), weight 68 5 kg (151 lb), SpO2 97 %  , Body mass index is 22 3 kg/m² ,   Orthostatic Blood Pressures    Flowsheet Row Most Recent Value   Blood Pressure 135/78 filed at 07/08/2022 1245   Patient Position - Orthostatic VS Lying filed at 07/07/2022 8493          Systolic (50TFM), NGY:553 , Min:108 , VERONICA:119     Diastolic (74PSZ), MJY:00, Min:59, Max:125        Intake/Output Summary (Last 24 hours) at 7/8/2022 1517  Last data filed at 7/8/2022 1013  Gross per 24 hour   Intake 200 ml   Output 0 ml   Net 200 ml       Invasive Devices  Report    Peripheral Intravenous Line  Duration           Peripheral IV 07/07/22 Right Antecubital 1 day                      Telemetry:  Telemetry Orders (From admission, onward)             24 Hour Telemetry Monitoring  Continuous x 24 Hours (Telem)        Expiring   References:    Telemetry Guidelines   Question:  Reason for 24 Hour Telemetry  Answer:  Patients waiting for PCI/EP Study/CABG                 Telemetry Reviewed: Normal Sinus Rhythm  Indication for Continued Telemetry Use: No indication for continued use  Will discontinue       BP Readings from Last 3 Encounters:   07/08/22 135/78   03/10/21 (!) 178/94   03/02/21 144/80 Wt Readings from Last 3 Encounters:   07/08/22 68 5 kg (151 lb)   03/10/21 76 2 kg (168 lb)   03/02/21 76 2 kg (168 lb)

## 2022-07-08 NOTE — PROGRESS NOTES
[facility]  Progress Note - Josh Anu 1960, 62 y o  male MRN: [id number]  Unit/Bed#: -01 Encounter: [id number]  Primary Care Provider: No primary care provider on file  Date and time admitted to hospital: 7/7/2022  6:47 AM    * Dyspnea  Assessment & Plan  · This is a 62-year-old male with a history of COPD and a previous PE who presented with sudden onset of shortness of breath which has now resolved  He also endorses a history of cardiomyopathy  · Patient states that shortness of breath has resolved at this time  · CTA PE study negative for any acute pulmonary embolism  · Echocardiogram read pending  · Stress test read pending  · Cardiology following  · Continue inhalers  · Monitor clinically, ventilatory status    Leukocytosis  Assessment & Plan    · Patient noted to have elevated [place] count of [address] on presentation  · Unclear etiology  · Significant downtrending white count at this time  · Patient denies any fever, chill, nausea or vomiting  · Follow-up blood cultures  · Follow-up UA  · Continue to monitor for any signs of worsening infection    Abnormal CT scan  Assessment & Plan  · CTA done for dyspnea purposes showed " Abnormal appearance of the spleen, in addition to typical arterial phase enhancement, this appearance could also be due to infarcts, correlate clinically   "  · Clinically patient denies any type of abdominal symptoms such as pain, he feels otherwise fine  Furthermore he is already on anticoagulation for PE  · Monitor clinically      Pulmonary emboli (HCC)  Assessment & Plan  · On apixaban, continue    Tobacco abuse  Assessment & Plan    · Encouraging to smoking cessation, nicotine patch to be provided while in the hospital    Hypertension, essential  Assessment & Plan  · Blood pressure well controlled  · Continue losartan and carvedilol  · Monitor BP        VTE Pharmacologic Prophylaxis:   Moderate Risk (Score 3-4) - Pharmacological DVT Prophylaxis Ordered: apixaban (Eliquis)  Patient Centered Rounds: I performed bedside rounds with nursing staff today  Discussions with Specialists or Other Care Team Provider:  Cardiology, Case Management    Education and Discussions with Family / Patient: Patient declined call to   Time Spent for Care: 30 minutes  More than 50% of total time spent on counseling and coordination of care as described above  Current Length of Stay: 0 day(s)  Current Patient Status: Observation   Certification Statement: Will continue to monitor given elevated white count and fevers outpatient  Discharge Plan: Anticipate discharge tomorrow to home  Code Status: Level 1 - Full Code    Subjective:   Patient resting comfortably on examination  Patient had no overnight events or complaints on exam this morning  Patient states he is feeling better compared to yesterday  Objective:     Vitals:   Temp (24hrs), Av 9 °F (36 6 °C), Min:97 8 °F (36 6 °C), Max:98 1 °F (36 7 °C)    Temp:  [97 8 °F (36 6 °C)-98 1 °F (36 7 °C)] 97 9 °F (36 6 °C)  HR:  [63-80] 65  Resp:  [17-27] 17  BP: (108-156)/() 135/78  SpO2:  [93 %-97 %] 97 %  Body mass index is 22 3 kg/m²  Input and Output Summary (last 24 hours): Intake/Output Summary (Last 24 hours) at 2022 1517  Last data filed at 2022 1013  Gross per 24 hour   Intake 200 ml   Output 0 ml   Net 200 ml       Physical Exam:   Physical Exam  Vitals and nursing note reviewed  Constitutional:       General: He is not in acute distress  Appearance: He is well-developed  HENT:      Head: Normocephalic and atraumatic  Eyes:      General: No scleral icterus  Conjunctiva/sclera: Conjunctivae normal    Cardiovascular:      Rate and Rhythm: Normal rate and regular rhythm  Heart sounds: Normal heart sounds  No murmur heard  No friction rub  No gallop  Pulmonary:      Effort: Pulmonary effort is normal  No respiratory distress        Breath sounds: Normal breath sounds  No wheezing or rales  Abdominal:      General: Bowel sounds are normal  There is no distension  Palpations: Abdomen is soft  Tenderness: There is no abdominal tenderness  Musculoskeletal:         General: Normal range of motion  Skin:     General: Skin is warm  Findings: No rash  Neurological:      Mental Status: He is alert and oriented to person, place, and time  Additional Data:     Labs:  Results from last 7 days   Lab Units 07/08/22  0519   WBC Thousand/uL 13 62*   HEMOGLOBIN g/dL 13 0   HEMATOCRIT % 38 1   PLATELETS Thousands/uL 347   NEUTROS PCT % 73   LYMPHS PCT % 15   MONOS PCT % 7   EOS PCT % 3     Results from last 7 days   Lab Units 07/08/22  0519 07/07/22  0723   SODIUM mmol/L 135* 138   POTASSIUM mmol/L 3 9 4 6   CHLORIDE mmol/L 102 104   CO2 mmol/L 26 24   BUN mg/dL 16 11   CREATININE mg/dL 0 85 1 00   ANION GAP mmol/L 7 10   CALCIUM mg/dL 8 6 8 9   ALBUMIN g/dL  --  3 9   TOTAL BILIRUBIN mg/dL  --  0 61   ALK PHOS U/L  --  67   ALT U/L  --  20   AST U/L  --  15   GLUCOSE RANDOM mg/dL 115 114     Results from last 7 days   Lab Units 07/07/22  0723   INR  1 04             Results from last 7 days   Lab Units 07/07/22  0836   LACTIC ACID mmol/L 1 2       Lines/Drains:  Invasive Devices  Report    Peripheral Intravenous Line  Duration           Peripheral IV 07/07/22 Right Antecubital 1 day                  Telemetry:  Telemetry Orders (From admission, onward)             24 Hour Telemetry Monitoring  Continuous x 24 Hours (Telem)        Expiring   References:    Telemetry Guidelines   Question:  Reason for 24 Hour Telemetry  Answer:  Patients waiting for PCI/EP Study/CABG                          Imaging: No pertinent imaging reviewed  Recent Cultures (last 7 days):   Results from last 7 days   Lab Units 07/07/22  0911 07/07/22  0836   BLOOD CULTURE  Received in Microbiology Lab  Culture in Progress  No Growth at 24 hrs         Last 24 Hours Medication List:   Current Facility-Administered Medications   Medication Dose Route Frequency Provider Last Rate    acetaminophen  650 mg Oral Q6H PRN Marquez Leal MD      albuterol  1 puff Inhalation Q4H PRN Marquez Leal MD      apixaban  5 mg Oral BID Marquez Leal MD      aspirin  81 mg Oral Daily Marquez Leal MD      carvedilol  3 125 mg Oral BID With Meals Marquez Leal MD      fluticasone-vilanterol  1 puff Inhalation Daily Marquez Leal MD      losartan  25 mg Oral Daily Marquez Leal MD      montelukast  10 mg Oral Daily Marquez Leal MD      nicotine  1 patch Transdermal Daily Marquez Leal MD      ondansetron  4 mg Intravenous Q6H PRN Marquez Leal MD      umeclidinium bromide  1 puff Inhalation Daily Marquez Leal MD          Today, Patient Was Seen By: Roxanne House DO    **Please Note: This note may have been constructed using a voice recognition system  **

## 2022-07-08 NOTE — PLAN OF CARE
Problem: Potential for Falls  Goal: Patient will remain free of falls  Description: INTERVENTIONS:  - Educate patient/family on patient safety including physical limitations  - Instruct patient to call for assistance with activity   - Consult OT/PT to assist with strengthening/mobility   - Keep Call bell within reach  - Keep bed low and locked with side rails adjusted as appropriate  - Keep care items and personal belongings within reach  - Initiate and maintain comfort rounds  - Make Fall Risk Sign visible to staff  - Offer Toileting every 2 Hours, in advance of need  - Initiate/Maintain bed alarm  - Obtain necessary fall risk management equipment: chair alarm  - Apply yellow socks and bracelet for high fall risk patients  - Consider moving patient to room near nurses station  Outcome: Progressing     Problem: PAIN - ADULT  Goal: Verbalizes/displays adequate comfort level or baseline comfort level  Description: Interventions:  - Encourage patient to monitor pain and request assistance  - Assess pain using appropriate pain scale  - Administer analgesics based on type and severity of pain and evaluate response  - Implement non-pharmacological measures as appropriate and evaluate response  - Consider cultural and social influences on pain and pain management  - Notify physician/advanced practitioner if interventions unsuccessful or patient reports new pain  Outcome: Progressing     Problem: INFECTION - ADULT  Goal: Absence or prevention of progression during hospitalization  Description: INTERVENTIONS:  - Assess and monitor for signs and symptoms of infection  - Monitor lab/diagnostic results  - Monitor all insertion sites, i e  indwelling lines, tubes, and drains  - Monitor endotracheal if appropriate and nasal secretions for changes in amount and color  - [place] appropriate cooling/warming therapies per order  - Administer medications as ordered  - Instruct and encourage patient and family to use good hand hygiene technique  - Identify and instruct in appropriate isolation precautions for identified infection/condition  Outcome: Progressing  Goal: Absence of fever/infection during neutropenic period  Description: INTERVENTIONS:  - Monitor WBC    Outcome: Progressing     Problem: SAFETY ADULT  Goal: Patient will remain free of falls  Description: INTERVENTIONS:  - Educate patient/family on patient safety including physical limitations  - Instruct patient to call for assistance with activity   - Consult OT/PT to assist with strengthening/mobility   - Keep Call bell within reach  - Keep bed low and locked with side rails adjusted as appropriate  - Keep care items and personal belongings within reach  - Initiate and maintain comfort rounds  - Make Fall Risk Sign visible to staff  - Offer Toileting every 2 Hours, in advance of need  - Initiate/Maintain bed alarm  - Obtain necessary fall risk management equipment: chair alarm  - Apply yellow socks and bracelet for high fall risk patients  - Consider moving patient to room near nurses station  Outcome: Progressing  Goal: Maintain or return to baseline ADL function  Description: INTERVENTIONS:  -  Assess patient's ability to carry out ADLs; assess patient's baseline for ADL function and identify physical deficits which impact ability to perform ADLs (bathing, care of mouth/teeth, toileting, grooming, dressing, etc )  - Assess/evaluate cause of self-care deficits   - Assess range of motion  - Assess patient's mobility; develop plan if impaired  - Assess patient's need for assistive devices and provide as appropriate  - Encourage maximum independence but intervene and supervise when necessary  - Involve family in performance of ADLs  - Assess for home care needs following discharge   - Consider OT consult to assist with ADL evaluation and planning for discharge  - Provide patient education as appropriate  Outcome: Progressing  Goal: Maintains/Returns to pre admission functional level  Description: INTERVENTIONS:  - Perform BMAT or MOVE assessment daily    - Set and communicate daily mobility goal to care team and patient/family/caregiver  - Collaborate with rehabilitation services on mobility goals if consulted  - Perform Range of Motion 3 times a day  - Reposition patient every 3 hours  - Dangle patient 3 times a day  - Stand patient 3 times a day  - Ambulate patient 3 times a day  - Out of bed to chair 3 times a day   - Out of bed for meals 3 times a day  - Out of bed for toileting  - Record patient progress and toleration of activity level   Outcome: Progressing     Problem: DISCHARGE PLANNING  Goal: Discharge to home or other facility with appropriate resources  Description: INTERVENTIONS:  - Identify barriers to discharge w/patient and caregiver  - Arrange for needed discharge resources and transportation as appropriate  - Identify discharge learning needs (meds, wound care, etc )  - Arrange for interpretive services to assist at discharge as needed  - Refer to Case Management Department for coordinating discharge planning if the patient needs post-hospital services based on physician/advanced practitioner order or complex needs related to functional status, cognitive ability, or social support system  Outcome: Progressing     Problem: Knowledge Deficit  Goal: Patient/family/caregiver demonstrates understanding of disease process, treatment plan, medications, and discharge instructions  Description: Complete learning assessment and assess knowledge base    Interventions:  - Provide teaching at level of understanding  - Provide teaching via preferred learning methods  Outcome: Progressing     Problem: CARDIOVASCULAR - ADULT  Goal: Maintains optimal cardiac output and hemodynamic stability  Description: INTERVENTIONS:  - Monitor I/O, vital signs and rhythm  - Monitor for S/S and trends of decreased cardiac output  - Administer and titrate ordered vasoactive medications to optimize hemodynamic stability  - Assess quality of pulses, skin color and temperature  - Assess for signs of decreased coronary artery perfusion  - Instruct patient to report change in severity of symptoms  Outcome: Progressing  Goal: Absence of cardiac dysrhythmias or at baseline rhythm  Description: INTERVENTIONS:  - Continuous cardiac monitoring, vital signs, obtain 12 lead EKG if ordered  - Administer antiarrhythmic and heart rate control medications as ordered  - Monitor electrolytes and administer replacement therapy as ordered  Outcome: Progressing

## 2022-07-09 VITALS
HEIGHT: 69 IN | TEMPERATURE: 98.2 F | DIASTOLIC BLOOD PRESSURE: 75 MMHG | RESPIRATION RATE: 19 BRPM | HEART RATE: 70 BPM | WEIGHT: 151 LBS | BODY MASS INDEX: 22.36 KG/M2 | OXYGEN SATURATION: 90 % | SYSTOLIC BLOOD PRESSURE: 124 MMHG

## 2022-07-09 LAB
BASOPHILS # BLD AUTO: 0.11 THOUSANDS/ΜL (ref 0–0.1)
BASOPHILS NFR BLD AUTO: 2 % (ref 0–1)
EOSINOPHIL # BLD AUTO: 0.34 THOUSAND/ΜL (ref 0–0.61)
EOSINOPHIL NFR BLD AUTO: 5 % (ref 0–6)
ERYTHROCYTE [DISTWIDTH] IN BLOOD BY AUTOMATED COUNT: 13 % (ref 11.6–15.1)
HCT VFR BLD AUTO: 42 % (ref 36.5–49.3)
HGB BLD-MCNC: 14.2 G/DL (ref 12–17)
IMM GRANULOCYTES # BLD AUTO: 0.02 THOUSAND/UL (ref 0–0.2)
IMM GRANULOCYTES NFR BLD AUTO: 0 % (ref 0–2)
LYMPHOCYTES # BLD AUTO: 1.95 THOUSANDS/ΜL (ref 0.6–4.47)
LYMPHOCYTES NFR BLD AUTO: 26 % (ref 14–44)
MCH RBC QN AUTO: 31.3 PG (ref 26.8–34.3)
MCHC RBC AUTO-ENTMCNC: 33.8 G/DL (ref 31.4–37.4)
MCV RBC AUTO: 93 FL (ref 82–98)
MONOCYTES # BLD AUTO: 0.85 THOUSAND/ΜL (ref 0.17–1.22)
MONOCYTES NFR BLD AUTO: 12 % (ref 4–12)
NEUTROPHILS # BLD AUTO: 4.14 THOUSANDS/ΜL (ref 1.85–7.62)
NEUTS SEG NFR BLD AUTO: 55 % (ref 43–75)
NRBC BLD AUTO-RTO: 0 /100 WBCS
PLATELET # BLD AUTO: 381 THOUSANDS/UL (ref 149–390)
PMV BLD AUTO: 9.1 FL (ref 8.9–12.7)
RBC # BLD AUTO: 4.53 MILLION/UL (ref 3.88–5.62)
WBC # BLD AUTO: 7.41 THOUSAND/UL (ref 4.31–10.16)

## 2022-07-09 PROCEDURE — 85025 COMPLETE CBC W/AUTO DIFF WBC: CPT | Performed by: INTERNAL MEDICINE

## 2022-07-09 PROCEDURE — 99217 PR OBSERVATION CARE DISCHARGE MANAGEMENT: CPT | Performed by: INTERNAL MEDICINE

## 2022-07-09 RX ORDER — HYDROXYZINE HYDROCHLORIDE 25 MG/1
25 TABLET, FILM COATED ORAL EVERY 6 HOURS PRN
Qty: 20 TABLET | Refills: 0 | Status: SHIPPED | OUTPATIENT
Start: 2022-07-09 | End: 2022-08-04 | Stop reason: HOSPADM

## 2022-07-09 RX ADMIN — NICOTINE 1 PATCH: 21 PATCH, EXTENDED RELEASE TRANSDERMAL at 08:55

## 2022-07-09 RX ADMIN — CARVEDILOL 3.12 MG: 3.12 TABLET, FILM COATED ORAL at 08:55

## 2022-07-09 RX ADMIN — APIXABAN 5 MG: 5 TABLET, FILM COATED ORAL at 08:55

## 2022-07-09 RX ADMIN — MONTELUKAST 10 MG: 10 TABLET, FILM COATED ORAL at 08:55

## 2022-07-09 RX ADMIN — FLUTICASONE FUROATE AND VILANTEROL TRIFENATATE 1 PUFF: 100; 25 POWDER RESPIRATORY (INHALATION) at 08:57

## 2022-07-09 RX ADMIN — UMECLIDINIUM 1 PUFF: 62.5 AEROSOL, POWDER ORAL at 08:57

## 2022-07-09 RX ADMIN — ASPIRIN 81 MG: 81 TABLET, CHEWABLE ORAL at 08:55

## 2022-07-09 RX ADMIN — LOSARTAN POTASSIUM 25 MG: 25 TABLET, FILM COATED ORAL at 08:55

## 2022-07-09 NOTE — PROGRESS NOTES
[facility]  Progress Note - Tiffany Deleon 1960, 62 y o  male MRN: [id number]  Unit/Bed#: -Francia Encounter: [id number]  Primary Care Provider: No primary care provider on file  Date and time admitted to hospital: 7/7/2022  6:47 AM    * Dyspnea  Assessment & Plan  · This is a 80-year-old male with a history of COPD and a previous PE who presented with sudden onset of shortness of breath which has now resolved  He also endorses a history of cardiomyopathy  · Patient with no further episodes of shortness of breath  · CTA PE study negative for any acute pulmonary embolism  · Echocardiogram-EF 60% and wall motion normal  · Nuclear med stress test normal  · Continue home inhalers  · Follow-up with primary care provider    Leukocytosis  Assessment & Plan    · Patient noted to have elevated white count of [address] on presentation  · Unclear etiology  · Within normal limits  · Blood culture x2 negative at 1 day  · UA negative    Abnormal CT scan  Assessment & Plan  · CTA done for dyspnea purposes showed " Abnormal appearance of the spleen, in addition to typical arterial phase enhancement, this appearance could also be due to infarcts, correlate clinically   "  · Clinically patient denies any type of abdominal symptoms such as pain, he feels otherwise fine  Furthermore he is already on anticoagulation for PE  · Monitor clinically  Pulmonary emboli (HCC)  Assessment & Plan  · On apixaban, continue    Tobacco abuse  Assessment & Plan    · Encouraging to smoking cessation, nicotine patch to be provided while in the hospital    Hypertension, essential  Assessment & Plan  · Blood pressure well controlled  · Continue home blood pressure medications on discharge      Medical Problems             Resolved Problems  Date Reviewed: 3/2/2021   None               Discharging Physician / Practitioner: Herminia Meredith DO  PCP: No primary care provider on file    Admission Date:   Admission Orders (From admission, onward)     Ordered        07/07/22 1534  Place in Observation  Once                      Discharge Date: 07/09/22    Consultations During Hospital Stay:  · Cardiology    Complications:  none    Reason for Admission:  Dyspnea    Hospital Course:   Beatriz Goodman is a 62 y o  male patient who originally presented to the hospital on 7/7/2022 due to dyspnea  Patient was seen by Cardiology team and had echocardiogram showing normal ejection fraction and also underwent nuclear med stress test which came back normal   Patient was noted to have elevated white count but has since normalized  Blood cultures negative x1 day, UA negative and no fevers noted during hospitalization  Patient should follow-up with primary care provider on discharge  Patient is stable for discharge at this time  Please see above list of diagnoses and related plan for additional information  Condition at Discharge: stable    Discharge Day Visit / Exam:   Subjective:  Patient resting comfortably on examination  Patient had no overnight events or complaints on exam   Vitals: Blood Pressure: 124/75 (07/09/22 0727)  Pulse: 70 (07/09/22 0727)  Temperature: 98 2 °F (36 8 °C) (07/09/22 0727)  Temp Source: Oral (07/07/22 0650)  Respirations: 19 (07/09/22 0727)  Height: 5' 9" (175 3 cm) (07/08/22 1245)  Weight - Scale: 68 5 kg (151 lb) (07/08/22 1245)  SpO2: 90 % (07/09/22 0727)  Exam:   Physical Exam  Vitals and nursing note reviewed  Constitutional:       General: He is not in acute distress  Appearance: He is well-developed  HENT:      Head: Normocephalic and atraumatic  Eyes:      General: No scleral icterus  Conjunctiva/sclera: Conjunctivae normal    Cardiovascular:      Rate and Rhythm: Normal rate and regular rhythm  Heart sounds: Normal heart sounds  No murmur heard  No friction rub  No gallop  Pulmonary:      Effort: Pulmonary effort is normal  No respiratory distress        Breath sounds: Normal breath sounds  No wheezing or rales  Abdominal:      General: Bowel sounds are normal  There is no distension  Palpations: Abdomen is soft  Tenderness: There is no abdominal tenderness  Musculoskeletal:         General: Normal range of motion  Skin:     General: Skin is warm  Findings: No rash  Neurological:      Mental Status: He is alert and oriented to person, place, and time  Discussion with Family: Patient declined call to   Discharge instructions/Information to patient and family:   See after visit summary for information provided to patient and family  Provisions for Follow-Up Care:  See after visit summary for information related to follow-up care and any pertinent home health orders  Disposition:   Home    Planned Readmission: none     Discharge Statement:  I spent 35 minutes discharging the patient  This time was spent on the day of discharge  I had direct contact with the patient on the day of discharge  Greater than 50% of the total time was spent examining patient, answering all patient questions, arranging and discussing plan of care with patient as well as directly providing post-discharge instructions  Additional time then spent on discharge activities  Discharge Medications:  See after visit summary for reconciled discharge medications provided to patient and/or family        **Please Note: This note may have been constructed using a voice recognition system**

## 2022-07-09 NOTE — QUICK NOTE
Reviewed telemetry order  Per review of patient's chart and telemetry guidelines, telemetry order has been discontinued  Per cardiology notes "pharmacologic MPI with no evidence of ischemia " Telemetry is no longer necessary per chart review, should patient's condition change RN has been instructed to notify [facility]

## 2022-07-09 NOTE — ASSESSMENT & PLAN NOTE
· This is a 50-year-old male with a history of COPD and a previous PE who presented with sudden onset of shortness of breath which has now resolved    He also endorses a history of cardiomyopathy  · Patient with no further episodes of shortness of breath  · CTA PE study negative for any acute pulmonary embolism  · Echocardiogram-EF 60% and wall motion normal  · Nuclear med stress test normal  · Continue home inhalers  · Follow-up with primary care provider

## 2022-07-09 NOTE — ASSESSMENT & PLAN NOTE
· Patient noted to have elevated white count of 24[address] on presentation  · Unclear etiology  · Within normal limits  · Blood culture x2 negative at 1 day  · UA negative

## 2022-07-09 NOTE — PLAN OF CARE
Problem: Potential for Falls  Goal: Patient will remain free of falls  Description: INTERVENTIONS:  - Educate patient/family on patient safety including physical limitations  - Instruct patient to call for assistance with activity   - Consult OT/PT to assist with strengthening/mobility   - Keep Call bell within reach  - Keep bed low and locked with side rails adjusted as appropriate  - Keep care items and personal belongings within reach  - Initiate and maintain comfort rounds  - Make Fall Risk Sign visible to staff  - Offer Toileting every 2 Hours, in advance of need  - Initiate/Maintain bed alarm  - Obtain necessary fall risk management equipment  - Apply yellow socks and bracelet for high fall risk patients  - Consider moving patient to room near nurses station  Outcome: Progressing     Problem: PAIN - ADULT  Goal: Verbalizes/displays adequate comfort level or baseline comfort level  Description: Interventions:  - Encourage patient to monitor pain and request assistance  - Assess pain using appropriate pain scale  - Administer analgesics based on type and severity of pain and evaluate response  - Implement non-pharmacological measures as appropriate and evaluate response  - Consider cultural and social influences on pain and pain management  - Notify physician/advanced practitioner if interventions unsuccessful or patient reports new pain  Outcome: Progressing     Problem: INFECTION - ADULT  Goal: Absence or prevention of progression during hospitalization  Description: INTERVENTIONS:  - Assess and monitor for signs and symptoms of infection  - Monitor lab/diagnostic results  - Monitor all insertion sites, i e  indwelling lines, tubes, and drains  - Monitor endotracheal if appropriate and nasal secretions for changes in amount and color  - [place] appropriate cooling/warming therapies per order  - Administer medications as ordered  - Instruct and encourage patient and family to use good hand hygiene technique  - Identify and instruct in appropriate isolation precautions for identified infection/condition  Outcome: Progressing  Goal: Absence of fever/infection during neutropenic period  Description: INTERVENTIONS:  - Monitor WBC    Outcome: Progressing     Problem: SAFETY ADULT  Goal: Patient will remain free of falls  Description: INTERVENTIONS:  - Educate patient/family on patient safety including physical limitations  - Instruct patient to call for assistance with activity   - Consult OT/PT to assist with strengthening/mobility   - Keep Call bell within reach  - Keep bed low and locked with side rails adjusted as appropriate  - Keep care items and personal belongings within reach  - Initiate and maintain comfort rounds  - Make Fall Risk Sign visible to staff  - Offer Toileting every 2 Hours, in advance of need  - Initiate/Maintain bed alarm  - Obtain necessary fall risk management equipment  - Apply yellow socks and bracelet for high fall risk patients  - Consider moving patient to room near nurses station  Outcome: Progressing  Goal: Maintain or return to baseline ADL function  Description: INTERVENTIONS:  -  Assess patient's ability to carry out ADLs; assess patient's baseline for ADL function and identify physical deficits which impact ability to perform ADLs (bathing, care of mouth/teeth, toileting, grooming, dressing, etc )  - Assess/evaluate cause of self-care deficits   - Assess range of motion  - Assess patient's mobility; develop plan if impaired  - Assess patient's need for assistive devices and provide as appropriate  - Encourage maximum independence but intervene and supervise when necessary  - Involve family in performance of ADLs  - Assess for home care needs following discharge   - Consider OT consult to assist with ADL evaluation and planning for discharge  - Provide patient education as appropriate  Outcome: Progressing  Goal: Maintains/Returns to pre admission functional level  Description: INTERVENTIONS:  - Perform BMAT or MOVE assessment daily    - Set and communicate daily mobility goal to care team and patient/family/caregiver  - Collaborate with rehabilitation services on mobility goals if consulted  - Perform Range of Motion 3 times a day  - Reposition patient every 2 hours  - Dangle patient 3 times a day  - Stand patient 3 times a day  - Ambulate patient 3 times a day  - Out of bed to chair 3 times a day   - Out of bed for meals 3 times a day  - Out of bed for toileting  - Record patient progress and toleration of activity level   Outcome: Progressing     Problem: DISCHARGE PLANNING  Goal: Discharge to home or other facility with appropriate resources  Description: INTERVENTIONS:  - Identify barriers to discharge w/patient and caregiver  - Arrange for needed discharge resources and transportation as appropriate  - Identify discharge learning needs (meds, wound care, etc )  - Arrange for interpretive services to assist at discharge as needed  - Refer to Case Management Department for coordinating discharge planning if the patient needs post-hospital services based on physician/advanced practitioner order or complex needs related to functional status, cognitive ability, or social support system  Outcome: Progressing     Problem: Knowledge Deficit  Goal: Patient/family/caregiver demonstrates understanding of disease process, treatment plan, medications, and discharge instructions  Description: Complete learning assessment and assess knowledge base    Interventions:  - Provide teaching at level of understanding  - Provide teaching via preferred learning methods  Outcome: Progressing     Problem: CARDIOVASCULAR - ADULT  Goal: Maintains optimal cardiac output and hemodynamic stability  Description: INTERVENTIONS:  - Monitor I/O, vital signs and rhythm  - Monitor for S/S and trends of decreased cardiac output  - Administer and titrate ordered vasoactive medications to optimize hemodynamic stability  - Assess quality of pulses, skin color and temperature  - Assess for signs of decreased coronary artery perfusion  - Instruct patient to report change in severity of symptoms  Outcome: Progressing  Goal: Absence of cardiac dysrhythmias or at baseline rhythm  Description: INTERVENTIONS:  - Continuous cardiac monitoring, vital signs, obtain 12 lead EKG if ordered  - Administer antiarrhythmic and heart rate control medications as ordered  - Monitor electrolytes and administer replacement therapy as ordered  Outcome: Progressing

## 2022-07-09 NOTE — ASSESSMENT & PLAN NOTE
· Patient noted to have elevated white count of [id number] on presentation  · Unclear etiology  · Within normal limits  · Blood culture x2 negative at 1 day  · UA negative

## 2022-07-09 NOTE — PLAN OF CARE
Problem: PAIN - ADULT  Goal: Verbalizes/displays adequate comfort level or baseline comfort level  Description: Interventions:  - Encourage patient to monitor pain and request assistance  - Assess pain using appropriate pain scale  - Administer analgesics based on type and severity of pain and evaluate response  - Implement non-pharmacological measures as appropriate and evaluate response  - Consider cultural and social influences on pain and pain management  - Notify physician/advanced practitioner if interventions unsuccessful or patient reports new pain  Outcome: Progressing     Problem: INFECTION - ADULT  Goal: Absence or prevention of progression during hospitalization  Description: INTERVENTIONS:  - Assess and monitor for signs and symptoms of infection  - Monitor lab/diagnostic results  - Monitor all insertion sites, i e  indwelling lines, tubes, and drains  - Monitor endotracheal if appropriate and nasal secretions for changes in amount and color  - [place] appropriate cooling/warming therapies per order  - Administer medications as ordered  - Instruct and encourage patient and family to use good hand hygiene technique  - Identify and instruct in appropriate isolation precautions for identified infection/condition  Outcome: Progressing  Goal: Absence of fever/infection during neutropenic period  Description: INTERVENTIONS:  - Monitor WBC    Outcome: Progressing     Problem: Knowledge Deficit  Goal: Patient/family/caregiver demonstrates understanding of disease process, treatment plan, medications, and discharge instructions  Description: Complete learning assessment and assess knowledge base    Interventions:  - Provide teaching at level of understanding  - Provide teaching via preferred learning methods  Outcome: Progressing     Problem: DISCHARGE PLANNING  Goal: Discharge to home or other facility with appropriate resources  Description: INTERVENTIONS:  - Identify barriers to discharge w/patient and caregiver  - Arrange for needed discharge resources and transportation as appropriate  - Identify discharge learning needs (meds, wound care, etc )  - Arrange for interpretive services to assist at discharge as needed  - Refer to Case Management Department for coordinating discharge planning if the patient needs post-hospital services based on physician/advanced practitioner order or complex needs related to functional status, cognitive ability, or social support system  Outcome: Progressing

## 2022-07-09 NOTE — DISCHARGE SUMMARY
[facility]  Discharge- Dio Nair 1960, 62 y o  male MRN: [id number]  Unit/Bed#: -01 Encounter: [id number]  Primary Care Provider: No primary care provider on file  Date and time admitted to hospital: 7/7/2022  6:47 AM    * Dyspnea  Assessment & Plan  · This is a 62-year-old male with a history of COPD and a previous PE who presented with sudden onset of shortness of breath which has now resolved  He also endorses a history of cardiomyopathy  · Patient with no further episodes of shortness of breath  · CTA PE study negative for any acute pulmonary embolism  · Echocardiogram-EF 60% and wall motion normal  · Nuclear med stress test normal  · Continue home inhalers  · Follow-up with primary care provider    Leukocytosis  Assessment & Plan    · Patient noted to have elevated [place] count of [id number] on presentation  · Unclear etiology  · Within normal limits  · Blood culture x2 negative at 1 day  · UA negative    Abnormal CT scan  Assessment & Plan  · CTA done for dyspnea purposes showed " Abnormal appearance of the spleen, in addition to typical arterial phase enhancement, this appearance could also be due to infarcts, correlate clinically   "  · Clinically patient denies any type of abdominal symptoms such as pain, he feels otherwise fine  Furthermore he is already on anticoagulation for PE  · Monitor clinically  Pulmonary emboli (HCC)  Assessment & Plan  · On apixaban, continue    Tobacco abuse  Assessment & Plan    · Encouraging to smoking cessation, nicotine patch to be provided while in the hospital    Hypertension, essential  Assessment & Plan  · Blood pressure well controlled  · Continue home blood pressure medications on discharge             Medical Problems                  Resolved Problems  Date Reviewed: 3/2/2021   None                   Discharging Physician / Practitioner: Eliane Weathers DO  PCP: No primary care provider on file    Admission Date: Admission Orders (From admission, onward)              Ordered          07/07/22 1534   Place in Observation  Once                          Discharge Date: 07/09/22     Consultations During Hospital Stay:  · Cardiology     Complications:  none     Reason for Admission:  Dyspnea     Hospital Course:   Jermaine Jara is a 62 y o  male patient who originally presented to the hospital on 7/7/2022 due to dyspnea  Patient was seen by Cardiology team and had echocardiogram showing normal ejection fraction and also underwent nuclear med stress test which came back normal   Patient was noted to have elevated white count but has since normalized  Blood cultures negative x1 day, UA negative and no fevers noted during hospitalization  Patient should follow-up with primary care provider on discharge  Patient is stable for discharge at this time      Please see above list of diagnoses and related plan for additional information       Condition at Discharge: stable     Discharge Day Visit / Exam:   Subjective:  Patient resting comfortably on examination  Patient had no overnight events or complaints on exam   Vitals: Blood Pressure: 124/75 (07/09/22 0727)  Pulse: 70 (07/09/22 0727)  Temperature: 98 2 °F (36 8 °C) (07/09/22 0727)  Temp Source: Oral (07/07/22 0650)  Respirations: 19 (07/09/22 0727)  Height: 5' 9" (175 3 cm) (07/08/22 1245)  Weight - Scale: 68 5 kg (151 lb) (07/08/22 1245)  SpO2: 90 % (07/09/22 0727)  Exam:   Physical Exam  Vitals and nursing note reviewed  Constitutional:       General: He is not in acute distress  Appearance: He is well-developed  HENT:      Head: Normocephalic and atraumatic  Eyes:      General: No scleral icterus  Conjunctiva/sclera: Conjunctivae normal    Cardiovascular:      Rate and Rhythm: Normal rate and regular rhythm  Heart sounds: Normal heart sounds  No murmur heard  No friction rub  No gallop     Pulmonary:      Effort: Pulmonary effort is normal  No respiratory distress  Breath sounds: Normal breath sounds  No wheezing or rales  Abdominal:      General: Bowel sounds are normal  There is no distension  Palpations: Abdomen is soft  Tenderness: There is no abdominal tenderness  Musculoskeletal:         General: Normal range of motion  Skin:     General: Skin is warm  Findings: No rash  Neurological:      Mental Status: He is alert and oriented to person, place, and time           Discussion with Family: Patient declined call to        Discharge instructions/Information to patient and family:   See after visit summary for information provided to patient and family        Provisions for Follow-Up Care:  See after visit summary for information related to follow-up care and any pertinent home health orders  Disposition:   Home     Planned Readmission: none     Discharge Statement:  I spent 35 minutes discharging the patient  This time was spent on the day of discharge  I had direct contact with the patient on the day of discharge  Greater than 50% of the total time was spent examining patient, answering all patient questions, arranging and discussing plan of care with patient as well as directly providing post-discharge instructions    Additional time then spent on discharge activities      Discharge Medications:  See after visit summary for reconciled discharge medications provided to patient and/or family        **Please Note: This note may have been constructed using a voice recognition system**      Electronically signed by Gelacio Tucker DO at 7/9/2022  8:57 AM

## 2022-07-09 NOTE — ASSESSMENT & PLAN NOTE
· This is a 62-year-old male with a history of COPD and a previous PE who presented with sudden onset of shortness of breath which has now resolved    He also endorses a history of cardiomyopathy  · Patient with no further episodes of shortness of breath  · CTA PE study negative for any acute pulmonary embolism  · Echocardiogram-EF 60% and wall motion normal  · Nuclear med stress test normal  · Continue home inhalers  · Follow-up with primary care provider

## 2022-07-10 LAB
ATRIAL RATE: 100 BPM
P AXIS: 74 DEGREES
PR INTERVAL: 132 MS
QRS AXIS: 79 DEGREES
QRSD INTERVAL: 94 MS
QT INTERVAL: 352 MS
QTC INTERVAL: 454 MS
T WAVE AXIS: 65 DEGREES
VENTRICULAR RATE: 100 BPM

## 2022-07-10 PROCEDURE — 93010 ELECTROCARDIOGRAM REPORT: CPT | Performed by: INTERNAL MEDICINE

## 2022-07-12 LAB
BACTERIA BLD CULT: NORMAL
BACTERIA BLD CULT: NORMAL

## 2022-07-19 NOTE — TELEPHONE ENCOUNTER
Pt called to schedule a F/UP appt the dates that pt had available Dr. DEL CASTILLO is not in office. Pt will cb with his schedule to see what days he as available when he gets off of work.

## 2022-07-21 DIAGNOSIS — I10 ESSENTIAL HYPERTENSION: ICD-10-CM

## 2022-07-21 RX ORDER — LOSARTAN POTASSIUM 25 MG/1
25 TABLET ORAL DAILY
Qty: 90 TABLET | Refills: 3 | Status: SHIPPED | OUTPATIENT
Start: 2022-07-21 | End: 2022-09-06

## 2022-08-04 ENCOUNTER — OFFICE VISIT (OUTPATIENT)
Dept: CARDIOLOGY CLINIC | Facility: CLINIC | Age: 62
End: 2022-08-04
Payer: COMMERCIAL

## 2022-08-04 VITALS
BODY MASS INDEX: 24.05 KG/M2 | HEIGHT: 69 IN | OXYGEN SATURATION: 95 % | DIASTOLIC BLOOD PRESSURE: 80 MMHG | SYSTOLIC BLOOD PRESSURE: 140 MMHG | WEIGHT: 162.4 LBS | HEART RATE: 94 BPM

## 2022-08-04 DIAGNOSIS — I10 ESSENTIAL HYPERTENSION: Primary | ICD-10-CM

## 2022-08-04 DIAGNOSIS — Z79.01 CHRONIC ANTICOAGULATION: ICD-10-CM

## 2022-08-04 DIAGNOSIS — E78.2 COMBINED HYPERLIPIDEMIA: ICD-10-CM

## 2022-08-04 DIAGNOSIS — F17.200 SMOKING: ICD-10-CM

## 2022-08-04 PROCEDURE — 99214 OFFICE O/P EST MOD 30 MIN: CPT | Performed by: INTERNAL MEDICINE

## 2022-08-04 RX ORDER — CELECOXIB 200 MG/1
200 CAPSULE ORAL DAILY
COMMUNITY
End: 2022-08-04

## 2022-08-04 RX ORDER — BENZONATATE 200 MG/1
200 CAPSULE ORAL 3 TIMES DAILY PRN
COMMUNITY
Start: 2022-06-14 | End: 2022-08-04

## 2022-08-04 RX ORDER — FLUTICASONE PROPIONATE AND SALMETEROL 250; 50 UG/1; UG/1
POWDER RESPIRATORY (INHALATION)
COMMUNITY
End: 2022-08-04 | Stop reason: SDUPTHER

## 2022-08-04 NOTE — PROGRESS NOTES
[facility] [address] [facility] [place] [address]  Cardiology Follow Up    Gi Santiago  1960  [id number]      1  Essential hypertension     2  Combined hyperlipidemia     3  Chronic anticoagulation     4  Smoking         Chief Complaint   Patient presents with    Follow-up     Routine follow up       Interval History:  Patient presents for follow-up visit  Patient does have history of cardiomyopathy from which she has recovered with normal ejection fraction  Patient also has history of recurrent DVT and pulmonary emboli on long-term anticoagulation with Eliquis  Patient was admitted with chest pain and shortness of breath  Patient had an echocardiogram as well as pharmacological stress test which were unremarkable  Patient feels much better  Unfortunately continues to smoke in spite of repeated counseling  Admits to significant stress at work  No bleeding issues      Patient Active Problem List   Diagnosis    Hypertension, essential    Asthma    Tobacco abuse    Dyspnea    Pulmonary emboli (HCC)    Abnormal CT scan    Leukocytosis     Past Medical History:   Diagnosis Date    Asthma     Cardiomyopathy ([facility] )     COPD (chronic obstructive pulmonary disease) ([facility] )     Hypertension      Social History     Socioeconomic History    Marital status: /Civil Union     Spouse name: Not on file    Number of children: Not on file    Years of education: Not on file    Highest education level: Not on file   Occupational History    Not on file   Tobacco Use    Smoking status: Current Every Day Smoker     Packs/day: 0 50     Types: Cigarettes    Smokeless tobacco: Never Used   Vaping Use    Vaping Use: Never used   Substance and Sexual Activity    Alcohol use: No    Drug use: No    Sexual activity: Not on file   Other Topics Concern    Not on file   Social History Narrative    Not on file     Social Determinants of Health     Financial Resource Strain: Not on file Food Insecurity: Not on file   Transportation Needs: Not on file   Physical Activity: Not on file   Stress: Not on file   Social Connections: Not on file   Intimate Partner Violence: Not on file   Housing Stability: Not on file      Family History   Problem Relation Age of Onset    Deep vein thrombosis Father      History reviewed  No pertinent surgical history      Current Outpatient Medications:     albuterol (PROAIR HFA) 90 mcg/act inhaler, Inhale 1 puff every 4 (four) hours as needed for wheezing or shortness of breath, Disp: 1 Inhaler, Rfl: 0    aspirin 81 MG tablet, Take 1 tablet by mouth daily, Disp: , Rfl:     carvedilol (COREG) 3 125 mg tablet, 1 tab twice a day, Disp: 180 tablet, Rfl: 3    Eliquis 5 MG, TAKE ONE TABLET BY MOUTH TWICE A DAY, Disp: 180 tablet, Rfl: 3    fluticasone-salmeterol (Advair) 250-50 mcg/dose inhaler, Inhale, Disp: , Rfl:     losartan (COZAAR) 25 mg tablet, Take 1 tablet (25 mg total) by mouth daily, Disp: 90 tablet, Rfl: 3    montelukast (SINGULAIR) 10 mg tablet, Take by mouth, Disp: , Rfl:     umeclidinium-vilanterol (Anoro Ellipta) 62 5-25 MCG/INH inhaler, Anoro Ellipta 62 5 mcg-25 mcg/actuation powder for inhalation  INHALE ONE PUFF BY MOUTH EVERY DAY, Disp: , Rfl:   Allergies   Allergen Reactions    Naproxen GI Intolerance    Pollen Extract Sneezing     Other reaction(s): Unknown       Labs:  Admission on 07/07/2022, Discharged on 07/09/2022   Component Date Value    WBC 07/07/2022 22 60 (A)    RBC 07/07/2022 4 63     Hemoglobin 07/07/2022 14 6     Hematocrit 07/07/2022 42 8     MCV 07/07/2022 92     MCH 07/07/2022 31 5     MCHC 07/07/2022 34 1     RDW 07/07/2022 13 2     MPV 07/07/2022 8 8 (A)    Platelets 73/37/6591 392 (A)    nRBC 07/07/2022 0     Neutrophils Relative 07/07/2022 88 (A)    Immat GRANS % 07/07/2022 1     Lymphocytes Relative 07/07/2022 4 (A)    Monocytes Relative 07/07/2022 5     Eosinophils Relative 07/07/2022 1     Basophils Relative 07/07/2022 1     Neutrophils Absolute 07/07/2022 20 06 (A)    Immature Grans Absolute 07/07/2022 0 14     Lymphocytes Absolute 07/07/2022 1 00     Monocytes Absolute 07/07/2022 1 01     Eosinophils Absolute 07/07/2022 0 23     Basophils Absolute 07/07/2022 0 16 (A)    Sodium 07/07/2022 138     Potassium 07/07/2022 4 6     Chloride 07/07/2022 104     CO2 07/07/2022 24     ANION GAP 07/07/2022 10     BUN 07/07/2022 11     Creatinine 07/07/2022 1 00     Glucose 07/07/2022 114     Calcium 07/07/2022 8 9     AST 07/07/2022 15     ALT 07/07/2022 20     Alkaline Phosphatase 07/07/2022 67     Total Protein 07/07/2022 7 6     Albumin 07/07/2022 3 9     Total Bilirubin 07/07/2022 0 61     eGFR 07/07/2022 81     Protime 07/07/2022 13 2     INR 07/07/2022 1 04     PTT 07/07/2022 34     NT-proBNP 07/07/2022 298 (A)    hs TnI 0hr 07/07/2022 2     Lipase 07/07/2022 68 (A)    D-Dimer, Quant 07/07/2022 2 92 (A)    hs TnI 2hr 07/07/2022 3     Delta 2hr hsTnI 07/07/2022 1     Blood Culture 07/07/2022 No Growth After 5 Days   Blood Culture 07/07/2022 No Growth After 5 Days       LACTIC ACID 07/07/2022 1 2     LA size 07/08/2022 2 3     LVPWd 07/08/2022 1 00     Left Atrium Area-systoli* 07/08/2022 19 5     Left Atrium Area-systoli* 07/08/2022 13     MV E' Tissue Velocity Se* 07/08/2022 10     IVSd 07/08/2022 9 93     LV DIASTOLIC VOLUME (MOD* 82/19/1825 123     LEFT VENTRICLE SYSTOLIC * 71/75/7138 42     Left ventricular stroke * 07/08/2022 81 00     A4C EF 07/08/2022 60     LA length (A2C) 07/08/2022 5 20     LVIDd 07/08/2022 5 10     IVS 07/08/2022 1     LVIDS 07/08/2022 3 20     FS 07/08/2022 37     Asc Ao 07/08/2022 3 2     Ao root 07/08/2022 3 60     RVID d 07/08/2022 3 4     MV valve area p 1/2 meth* 07/08/2022 1 58     E wave deceleration time 07/08/2022 481     MV Peak E Leandro 07/08/2022 40     MV Peak A Leandro 07/08/2022 0 58     LENNY A4C 07/08/2022 14     RAA A4C 07/08/2022 11 6     MV stenosis pressure 1/2* 07/08/2022 139     LVSV, 2D 07/08/2022 81     LV EF 07/08/2022 60     Baseline HR 07/08/2022 66     Baseline BP 07/08/2022 122/80     O2 sat rest 07/08/2022 94     Stress peak HR 07/08/2022 118     Post peak BP 07/08/2022 128     Rate Pressure Product 07/08/2022 15,104 0     O2 sat peak 07/08/2022 92     Recovery HR 07/08/2022 90     Recovery BP 07/08/2022 144/78     O2 sat recovery 07/08/2022 94     Max HR 07/08/2022 125     Angina Index 07/08/2022 0     Rest Nuclear Isotope Dose 07/08/2022 11 00     Stress Nuclear Isotope D* 07/08/2022 32 40     EF (%) 07/08/2022 57     Stress/rest perfusion ra* 07/08/2022 1 12     WBC 07/08/2022 13 62 (A)    RBC 07/08/2022 4 12     Hemoglobin 07/08/2022 13 0     Hematocrit 07/08/2022 38 1     MCV 07/08/2022 93     MCH 07/08/2022 31 6     MCHC 07/08/2022 34 1     RDW 07/08/2022 13 2     MPV 07/08/2022 9 3     Platelets 86/97/8507 347     nRBC 07/08/2022 0     Neutrophils Relative 07/08/2022 73     Immat GRANS % 07/08/2022 1     Lymphocytes Relative 07/08/2022 15     Monocytes Relative 07/08/2022 7     Eosinophils Relative 07/08/2022 3     Basophils Relative 07/08/2022 1     Neutrophils Absolute 07/08/2022 10 13 (A)    Immature Grans Absolute 07/08/2022 0 07     Lymphocytes Absolute 07/08/2022 1 97     Monocytes Absolute 07/08/2022 0 90     Eosinophils Absolute 07/08/2022 0 41     Basophils Absolute 07/08/2022 0 14 (A)    Sodium 07/08/2022 135 (A)    Potassium 07/08/2022 3 9     Chloride 07/08/2022 102     CO2 07/08/2022 26     ANION GAP 07/08/2022 7     BUN 07/08/2022 16     Creatinine 07/08/2022 0 85     Glucose 07/08/2022 115     Calcium 07/08/2022 8 6     eGFR 07/08/2022 94     Clarity, UA 07/08/2022 Clear     Color, UA 07/08/2022 Yellow     Specific Gravity, UA 07/08/2022 1 010     pH, UA 07/08/2022 6 5     Glucose, UA 07/08/2022 Negative     Ketones, UA 07/08/2022 Negative  Occult Blood, UA 07/08/2022 Negative     Protein, UA 07/08/2022 Negative     Nitrite, UA 07/08/2022 Negative     Bilirubin, UA 07/08/2022 Negative     Urobilinogen, UA 07/08/2022 0 2     Leukocytes, UA 07/08/2022 Negative     WBC, UA 07/08/2022 0-1 (A)    RBC, UA 07/08/2022 None Seen     Bacteria, UA 07/08/2022 None Seen     Epithelial Cells 07/08/2022 None Seen     Protocol Name 07/08/2022 LEXISCAN-TM     Time In Exercise Phase 07/08/2022 00:03:00     MAX   SYSTOLIC BP 95/83/3996 191     Max Diastolic Bp 47/36/9098 78     Max Heart Rate 07/08/2022 125     Max Predicted Heart Rate 07/08/2022 160     Reason for Termination 07/08/2022 Protocol Complete     Test Indication 07/08/2022 SOB, angina equivalent     Target Hr Formular 07/08/2022 (220 - Age)*85%     Chest Pain Statement 07/08/2022 none     WBC 07/09/2022 7 41     RBC 07/09/2022 4 53     Hemoglobin 07/09/2022 14 2     Hematocrit 07/09/2022 42 0     MCV 07/09/2022 93     MCH 07/09/2022 31 3     MCHC 07/09/2022 33 8     RDW 07/09/2022 13 0     MPV 07/09/2022 9 1     Platelets 18/43/6944 381     nRBC 07/09/2022 0     Neutrophils Relative 07/09/2022 55     Immat GRANS % 07/09/2022 0     Lymphocytes Relative 07/09/2022 26     Monocytes Relative 07/09/2022 12     Eosinophils Relative 07/09/2022 5     Basophils Relative 07/09/2022 2 (A)    Neutrophils Absolute 07/09/2022 4 14     Immature Grans Absolute 07/09/2022 0 02     Lymphocytes Absolute 07/09/2022 1 95     Monocytes Absolute 07/09/2022 0 85     Eosinophils Absolute 07/09/2022 0 34     Basophils Absolute 07/09/2022 0 11 (A)    Ventricular Rate 07/07/2022 100     Atrial Rate 07/07/2022 100     IL Interval 07/07/2022 132     QRSD Interval 07/07/2022 94     QT Interval 07/07/2022 352     QTC Interval 07/07/2022 454     P Axis 07/07/2022 74     QRS Axis 07/07/2022 79     T Wave Axis 07/07/2022 65      Imaging: XR chest pa & lateral    Result Date: 7/7/2022  Narrative: CHEST INDICATION:   cough, sob  COMPARISON:  Chest radiograph and CT from 1/12/2020  EXAM PERFORMED/VIEWS:  XR CHEST PA & LATERAL  DUAL ENERGY SUBTRACTION  FINDINGS: Cardiomediastinal silhouette appears unremarkable  The lungs are clear  No pneumothorax or pleural effusion  Osseous structures appear within normal limits for patient age  Impression: No acute cardiopulmonary disease  Workstation performed: [id number]     Stress strip    Result Date: 7/8/2022  Narrative: Confirmed by Heather El ([id number]),  Gabby Welch ([id number]) on 7/8/2022 12:39:52 PM    CTA ED chest PE Study    Result Date: 7/7/2022  Narrative: CTA - CHEST WITH IV CONTRAST - PULMONARY ANGIOGRAM INDICATION:   cp, sob, h/o cardiomyopathy and PE  WBCs ~ 23 COMPARISON: CT 1/12/20 TECHNIQUE: CTA examination of the chest was performed using angiographic technique according to a protocol specifically tailored to evaluate for pulmonary embolism  Axial, sagittal, and coronal 2D reformatted images were created from the source data and  submitted for interpretation  In addition, coronal 3D MIP postprocessing was performed on the acquisition scanner  Radiation dose length product (DLP) for this visit:  391 mGy-cm   This examination, like all CT scans performed in the [facility], was performed utilizing techniques to minimize radiation dose exposure, including the use of iterative reconstruction and automated exposure control  IV Contrast:  70 mL of iohexol (OMNIPAQUE)  FINDINGS: PULMONARY ARTERIAL TREE:  No pulmonary embolus is seen  LUNGS:  Lungs are clear  There is no tracheal or endobronchial lesion  PLEURA:  Unremarkable  HEART/GREAT VESSELS:  Unremarkable for patient's age  No thoracic aortic aneurysm  MEDIASTINUM AND CODIE:  Unremarkable  CHEST WALL AND LOWER NECK:   Unremarkable   VISUALIZED STRUCTURES IN THE UPPER ABDOMEN:  There are periphery relative hypodensities within the spleen, best appreciated on series 601/98  OSSEOUS STRUCTURES:  No acute fracture or destructive osseous lesion  Impression: 1  No pulmonary embolism 2  Abnormal appearance of the spleen, in addition to typical arterial phase enhancement, this appearance could also be due to infarcts, correlate clinically  I personally discussed this study  with Waynette Sacks on 7/7/2022 at 9:08 AM  Workstation performed: [id number]     Echo complete w/ contrast if indicated    Result Date: 7/8/2022  Narrative: [facility] Driver  Left Ventricle: Left ventricular cavity size is normal  Wall thickness is normal  The left ventricular ejection fraction is 60%  Systolic function is normal  Wall motion is normal  Diastolic function is normal for age    Mitral Valve: There is annular calcification  NM myocardial perfusion spect (rx stress and/or rest)    Result Date: 7/8/2022  Narrative: [facility] Driver  Resting ECG: The ECG shows normal sinus rhythm    Stress ECG: The ECG was not diagnostic due to pharmacological (vasodilator) stress    Perfusion: There is a left ventricular perfusion defect present in the inferior location(s) that is partially reversible  The defect appears to be probable artifact caused by subdiaphragmatic activity  It resolved on prone imaging    Stress Function: Left ventricular function post-stress is normal  Post-stress ejection fraction is 57 %    Stress Combined Conclusion: There is image artifact, without diagnostic evidence for perfusion abnormality    Perfusion Defect Conclusion: The stress/rest perfusion ratio is 1 12   There is no evidence of transient ischemic dilation (TID)  The rest end diastolic cavity size is normal  Normal study after pharmacologic vasodilation  There was image artifact without diagnostic evidence of perfusion abnormality  Left ventricular function was preserved         Review of Systems:  Review of Systems   REVIEW OF SYSTEMS:  Constitutional:  Denies fever or chills   Eyes:  Denies change in visual acuity   HENT: Denies nasal congestion or sore throat   Respiratory:   shortness of breath   Cardiovascular:  Denies chest pain or edema   GI:  Denies abdominal pain, nausea, vomiting, bloody stools or diarrhea   :  Denies dysuria, frequency, difficulty in micturition and nocturia  Musculoskeletal:  Denies back pain or joint pain   Neurologic:  Denies headache, focal weakness or sensory changes   Endocrine:  Denies polyuria or polydipsia   Lymphatic:  Denies swollen glands   Psychiatric:  Denies depression or anxiety       Physical Exam:    /80 (BP Location: Left arm, Patient Position: Sitting, Cuff Size: Standard)   Pulse 94   Ht 5' 9" (1 753 m)   Wt 73 7 kg (162 lb 6 4 oz)   SpO2 95%   BMI 23 98 kg/m²     Physical Exam  PHYSICAL EXAM:  General:  Patient is not in acute distress   Head: Normocephalic, Atraumatic  HEENT:  Both pupils normal-size atraumatic, normocephalic, nonicteric  Neck:  JVP not raised  Trachea central  No carotid bruit  Respiratory:  Decreased breath sounds bilateral  Cardiovascular:  Regular rate and rhythm no S3 no murmurs  GI:  Abdomen soft nontender  No organomegaly  Lymphatic:  No cervical or inguinal lymphadenopathy  Neurologic:  Patient is awake alert, oriented   Grossly nonfocal  Extremities no edema    Discussion/Summary:  Patient with multiple medical problems who seems to be doing reasonably well from cardiac standpoint  Previous studies reviewed with patient  Medications reviewed and possible side effects discussed  concepts of cardiovascular disease , signs and symptoms of heart disease  Dietary and risk factor modification reinforced  All questions answered  Safety measures reviewed  Patient advised to report any problems prompting medical attention  Patient understands the risks and benefits of anticoagulation to prevent venous thromboembolism and pulmonary embolism  Patient to report any bleeding issues  Prescriptions for his medications reviewed and refilled    Sensitivity and specificity of stress test discussed with patient  Symptoms to watch out from cardiac standpoint which would indicate the need for further cardiac evaluation also discussed  Patient strongly counseled to quit smoking to prevent future cardiovascular events  Follow-up in 6 months or earlier as needed  Patient is agreeable with the plan of care

## 2022-09-06 DIAGNOSIS — I10 ESSENTIAL HYPERTENSION: ICD-10-CM

## 2022-09-06 RX ORDER — LOSARTAN POTASSIUM 25 MG/1
TABLET ORAL
Qty: 90 TABLET | Refills: 3 | Status: SHIPPED | OUTPATIENT
Start: 2022-09-06

## 2022-09-09 DIAGNOSIS — I10 HYPERTENSION, ESSENTIAL: ICD-10-CM

## 2022-09-09 RX ORDER — CARVEDILOL 3.12 MG/1
TABLET ORAL
Qty: 180 TABLET | Refills: 3 | Status: SHIPPED | OUTPATIENT
Start: 2022-09-09

## 2022-09-09 NOTE — TELEPHONE ENCOUNTER
Patient Rony Anupama [phone] contacting office requesting refill on his carvedilol for a 90 day supply to be sent to [facility] in [facility]

## 2022-12-13 ENCOUNTER — HOSPITAL ENCOUNTER (EMERGENCY)
Facility: HOSPITAL | Age: 62
Discharge: HOME | End: 2022-12-13
Attending: EMERGENCY MEDICINE

## 2022-12-13 ENCOUNTER — APPOINTMENT (EMERGENCY)
Dept: RADIOLOGY | Facility: HOSPITAL | Age: 62
End: 2022-12-13

## 2022-12-13 VITALS
TEMPERATURE: 99.2 F | BODY MASS INDEX: 23.91 KG/M2 | HEART RATE: 95 BPM | WEIGHT: 167 LBS | RESPIRATION RATE: 18 BRPM | HEIGHT: 70 IN | SYSTOLIC BLOOD PRESSURE: 162 MMHG | DIASTOLIC BLOOD PRESSURE: 87 MMHG | OXYGEN SATURATION: 97 %

## 2022-12-13 DIAGNOSIS — J10.1 INFLUENZA A: ICD-10-CM

## 2022-12-13 DIAGNOSIS — H61.20 CERUMEN IMPACTION: Primary | ICD-10-CM

## 2022-12-13 DIAGNOSIS — J06.9 VIRAL URI WITH COUGH: ICD-10-CM

## 2022-12-13 LAB
FLUAV RNA RESP QL NAA+PROBE: POSITIVE
FLUBV RNA RESP QL NAA+PROBE: NEGATIVE
RSV RNA RESP QL NAA+PROBE: NEGATIVE
SARS-COV-2 RNA RESP QL NAA+PROBE: NEGATIVE

## 2022-12-13 RX ORDER — OSELTAMIVIR PHOSPHATE 75 MG/1
75 CAPSULE ORAL 2 TIMES DAILY
Qty: 10 CAPSULE | Refills: 0 | Status: SHIPPED | OUTPATIENT
Start: 2022-12-13 | End: 2022-12-18

## 2022-12-13 RX ORDER — BENZONATATE 100 MG/1
100 CAPSULE ORAL EVERY 8 HOURS
Qty: 21 CAPSULE | Refills: 0 | Status: SHIPPED | OUTPATIENT
Start: 2022-12-13

## 2022-12-13 NOTE — Clinical Note
Beatriz Maxine was seen and treated in our emergency department on 12/13/2022  Diagnosis:     Aleksandra Belcher  may return to work on return date  He may return on this date: 12/19/2022         If you have any questions or concerns, please don't hesitate to call        Amaury Peterson MD    ______________________________           _______________          _______________  Hospital Representative                              Date                                Time

## 2022-12-13 NOTE — RESULT ENCOUNTER NOTE
Called patient  Advised of Flu A positive result   Tamiflu prescribed and sent to [facility] pharmacy per patient request

## 2022-12-13 NOTE — ED PROVIDER NOTES
History  Chief Complaint   Patient presents with   • Nasal Congestion     Pt  Presents to ER with c/o congestion and cough for about 3 weeks  Is a 45-year-old male presents emergency department with several weeks of cough and congestion intermittently  About 3 weeks ago he was placed on azithromycin for bronchitis while he was traveling to [place]  Patient was feeling better and then last night started with cough and congestion again  Also notes that his ears feel a bit clogged  No wheezing  The patient is a daily smoker  Increased smoking recently because of stress due to loss of both parents recently  No fevers or chills  Symptoms are moderate in severity  No aggravating relieving factors  No radiation of symptoms  No shortness of breath but does have frequent coughing  Prior to Admission Medications   Prescriptions Last Dose Informant Patient Reported? Taking? Eliquis 5 MG   No No   Sig: TAKE ONE TABLET BY MOUTH TWICE A DAY   albuterol (PROAIR HFA) 90 mcg/act inhaler   No No   Sig: Inhale 1 puff every 4 (four) hours as needed for wheezing or shortness of breath   aspirin 81 MG tablet   Yes No   Sig: Take 1 tablet by mouth daily   carvedilol (COREG) 3 125 mg tablet   No No   Si tab twice a day   fluticasone-salmeterol (Advair) 250-50 mcg/dose inhaler   Yes No   Sig: Inhale   losartan (COZAAR) 25 mg tablet   No No   Sig: TAKE ONE TABLET BY MOUTH EVERY DAY   montelukast (SINGULAIR) 10 mg tablet   Yes No   Sig: Take by mouth   umeclidinium-vilanterol (Anoro Ellipta) 62 5-25 MCG/INH inhaler   Yes No   Sig: Anoro Ellipta 62 5 mcg-25 mcg/actuation powder for inhalation   INHALE ONE PUFF BY MOUTH EVERY DAY      Facility-Administered Medications: None       Past Medical History:   Diagnosis Date   • Asthma    • Cardiomyopathy ([facility] [address] )    • COPD (chronic obstructive pulmonary disease) ([facility])    • Hypertension        History reviewed  No pertinent surgical history      Family History   Problem Relation Age of Onset   • Deep vein thrombosis Father      I have reviewed and agree with the history as documented  E-Cigarette/Vaping   • E-Cigarette Use Never User      E-Cigarette/Vaping Substances     Social History     Tobacco Use   • Smoking status: Every Day     Packs/day: 0 50     Types: Cigarettes   • Smokeless tobacco: Never   Vaping Use   • Vaping Use: Never used   Substance Use Topics   • Alcohol use: No   • Drug use: No       Review of Systems   Constitutional: Negative for activity change, appetite change, chills and fever  HENT: Negative for congestion, ear discharge, ear pain, rhinorrhea and sore throat  Eyes: Negative for pain, discharge and redness  Respiratory: Positive for cough  Negative for shortness of breath and wheezing  Cardiovascular: Negative for chest pain and palpitations  Gastrointestinal: Negative for abdominal pain, diarrhea, nausea and vomiting  Genitourinary: Negative for difficulty urinating  Skin: Negative for color change and rash  Allergic/Immunologic: Negative for immunocompromised state  Neurological: Negative for dizziness, weakness and light-headedness  Hematological: Does not bruise/bleed easily  Psychiatric/Behavioral: Negative for confusion  All other systems reviewed and are negative  Physical Exam  Physical Exam  Vitals and nursing note reviewed  Constitutional:       General: He is not in acute distress  Appearance: He is well-developed  HENT:      Head: Normocephalic and atraumatic  Right Ear: External ear normal  There is impacted cerumen  Left Ear: External ear normal  There is impacted cerumen  Nose: Nose normal    Eyes:      General: No scleral icterus  Conjunctiva/sclera: Conjunctivae normal    Cardiovascular:      Rate and Rhythm: Normal rate and regular rhythm  Heart sounds: Normal heart sounds  Pulmonary:      Effort: Pulmonary effort is normal  No respiratory distress        Breath sounds: Normal breath sounds  No stridor  No wheezing  Comments: Productive cough  No wheezes  Abdominal:      General: There is no distension  Palpations: Abdomen is soft  Tenderness: There is no abdominal tenderness  There is no guarding or rebound  Musculoskeletal:         General: No deformity  Cervical back: Normal range of motion and neck supple  Skin:     General: Skin is warm and dry  Findings: No rash  Neurological:      Mental Status: He is alert and oriented to person, place, and time  Psychiatric:         Thought Content: Thought content normal          Vital Signs  ED Triage Vitals [12/13/22 1333]   Temperature Pulse Respirations Blood Pressure SpO2   99 2 °F (37 3 °C) 95 18 162/87 97 %      Temp Source Heart Rate Source Patient Position - Orthostatic VS BP Location FiO2 (%)   Oral Monitor Sitting Left arm --      Pain Score       5           Vitals:    12/13/22 1333   BP: 162/87   Pulse: 95   Patient Position - Orthostatic VS: Sitting         Visual Acuity      ED Medications  Medications - No data to display    Diagnostic Studies  Results Reviewed     Procedure Component Value Units Date/Time    FLU/RSV/COVID - if FLU/RSV clinically relevant [[id number]]  (Abnormal) Collected: 12/13/22 1501    Lab Status: Final result Specimen: Nares from Nose Updated: 12/13/22 1649     SARS-CoV-2 Negative     INFLUENZA A PCR Positive     INFLUENZA B PCR Negative     RSV PCR Negative    Narrative:      FOR PEDIATRIC PATIENTS - copy/paste COVID Guidelines URL to browser: [email or web address] org/  [email or web address]    SARS-CoV-2 assay is a Nucleic Acid Amplification assay intended for the  qualitative detection of nucleic acid from SARS-CoV-2 in nasopharyngeal  swabs  Results are for the presumptive identification of SARS-CoV-2 RNA      Positive results are indicative of infection with SARS-CoV-2, the virus  causing COVID-19, but do not rule out bacterial infection or co-infection  with other viruses  Laboratories within the [place] and its  territories are required to report all positive results to the appropriate  public health authorities  Negative results do not preclude SARS-CoV-2  infection and should not be used as the sole basis for treatment or other  patient management decisions  Negative results must be combined with  clinical observations, patient history, and epidemiological information  This test has not been FDA cleared or approved  This test has been authorized by FDA under an Emergency Use Authorization  (EUA)  This test is only authorized for the duration of time the  declaration that circumstances exist justifying the authorization of the  emergency use of an in vitro diagnostic tests for detection of SARS-CoV-2  virus and/or diagnosis of COVID-19 infection under section 564(b)(1) of  the Act, 21 U  S C  611FUR-9(Y)(1), unless the authorization is terminated  or revoked sooner  The test has been validated but independent review by FDA  and CLIA is pending  Test performed using [employer] GeneXpert: This RT-PCR assay targets N2,  a region unique to SARS-CoV-2  A conserved region in the E-gene was chosen  for pan-Sarbecovirus detection which includes SARS-CoV-2  According to CMS-2020-01-R, this platform meets the definition of high-throughput technology  XR chest 2 views   ED Interpretation by Shona Joyner MD (12/13 1414)   No infiltrate  Procedures  Procedures         ED Course  ED Course as of 12/13/22 1722   Tue Dec 13, 2022   1442  Patient will await callback with results  CXR negative for infiltrate  1453 Patient has medication contraindications but does have indications for molnupiravir  Advised of EUA status  Information sheet given  No concern for pregnancy of partner  Advised of risk  Will await covid result                                 SBIRT 20yo+    Flowsheet Row Most Recent Value   SBIRT (21 yo +)    In order to provide better care to our patients, we are screening all of our patients for alcohol and drug use  Would it be okay to ask you these screening questions? Yes Filed at: 12/13/2022 1350   Initial Alcohol Screen: US AUDIT-C     1  How often do you have a drink containing alcohol? 0 Filed at: 12/13/2022 1350   2  How many drinks containing alcohol do you have on a typical day you are drinking? 0 Filed at: 12/13/2022 1350   3a  Male UNDER 65: How often do you have five or more drinks on one occasion? 0 Filed at: 12/13/2022 1350   3b  FEMALE Any Age, or MALE 65+: How often do you have 4 or more drinks on one occassion? 0 Filed at: 12/13/2022 1350   Audit-C Score 0 Filed at: 12/13/2022 1350   PHOEN[place]: How many times in the past year have you    Used an illegal drug or used a prescription medication for non-medical reasons? Never Filed at: 12/13/2022 1350                    MDM    Disposition  Final diagnoses:   Cerumen impaction   Viral URI with cough   Influenza A     Time reflects when diagnosis was documented in both MDM as applicable and the Disposition within this note     Time User Action Codes Description Comment    12/13/2022  1:58 PM Mimi Rosa Add [H61 20] Cerumen impaction     12/13/2022  2:40 PM Bryant Bess Add [J06 9] Viral URI with cough     12/13/2022  5:20 PM Mimi Rosa Add [J10 1] Influenza A       ED Disposition     ED Disposition   Discharge    Condition   Stable    Date/Time   Tue Dec 13, 2022  2:40 PM    Comment   Naman Browning discharge to home/self care                 Follow-up Information     Follow up With Specialties Details Why Contact Info    Your PCP  In 5 days if not improved           Discharge Medication List as of 12/13/2022  2:55 PM      START taking these medications    Details   benzonatate (TESSALON PERLES) 100 mg capsule Take 1 capsule (100 mg total) by mouth every 8 (eight) hours, Starting Tue 12/13/2022, Normal      carbamide peroxide (DEBROX) 6 5 % otic solution Administer 5 drops into ears 2 (two) times a day, Starting Tue 12/13/2022, Normal         CONTINUE these medications which have NOT CHANGED    Details   albuterol (PROAIR HFA) 90 mcg/act inhaler Inhale 1 puff every 4 (four) hours as needed for wheezing or shortness of breath, Starting Tue 2/6/2018, Normal      aspirin 81 MG tablet Take 1 tablet by mouth daily, Starting u 2/19/2015, Historical Med      carvedilol (COREG) 3 125 mg tablet 1 tab twice a day, Normal      Eliquis 5 MG TAKE ONE TABLET BY MOUTH TWICE A DAY, Normal      fluticasone-salmeterol (Advair) 250-50 mcg/dose inhaler Inhale, Historical Med      losartan (COZAAR) 25 mg tablet TAKE ONE TABLET BY MOUTH EVERY DAY, Normal      montelukast (SINGULAIR) 10 mg tablet Take by mouth, Historical Med      umeclidinium-vilanterol (Anoro Ellipta) 62 5-25 MCG/INH inhaler Anoro Ellipta 62 5 mcg-25 mcg/actuation powder for inhalation   INHALE ONE PUFF BY MOUTH EVERY DAY, Historical Med             No discharge procedures on file      PDMP Review     None          ED Provider  Electronically Signed by           Dinorah Fisher MD  12/13/22 6036

## 2023-02-26 DIAGNOSIS — R06.00 DYSPNEA: ICD-10-CM

## 2023-02-27 RX ORDER — APIXABAN 5 MG/1
TABLET, FILM COATED ORAL
Qty: 180 TABLET | Refills: 3 | Status: SHIPPED | OUTPATIENT
Start: 2023-02-27

## 2023-09-02 DIAGNOSIS — I10 HYPERTENSION, ESSENTIAL: ICD-10-CM

## 2023-09-05 RX ORDER — CARVEDILOL 3.12 MG/1
TABLET ORAL
Qty: 180 TABLET | Refills: 3 | Status: SHIPPED | OUTPATIENT
Start: 2023-09-05

## 2023-09-20 DIAGNOSIS — I10 HYPERTENSION, ESSENTIAL: ICD-10-CM

## 2023-09-20 DIAGNOSIS — I10 ESSENTIAL HYPERTENSION: ICD-10-CM

## 2023-09-20 NOTE — TELEPHONE ENCOUNTER
Name of Caller: Patient     Call back Number:  [phone]    Medication(s): carvedilol (COREG) 3.125 mg tablet  losartan (COZAAR) 25 mg tablet  Are we prescribing provider?:    30 or 90 day supply: 90 day supply     Pharmacy name/number:   [address] [place] [address]        Last or Next appt?:  12/28/23

## 2023-09-21 ENCOUNTER — APPOINTMENT (EMERGENCY)
Dept: RADIOLOGY | Facility: HOSPITAL | Age: 63
End: 2023-09-21
Payer: COMMERCIAL

## 2023-09-21 ENCOUNTER — HOSPITAL ENCOUNTER (EMERGENCY)
Facility: HOSPITAL | Age: 63
Discharge: HOME | End: 2023-09-21
Attending: EMERGENCY MEDICINE
Payer: COMMERCIAL

## 2023-09-21 VITALS
SYSTOLIC BLOOD PRESSURE: 158 MMHG | HEART RATE: 67 BPM | OXYGEN SATURATION: 97 % | TEMPERATURE: 97 F | RESPIRATION RATE: 18 BRPM | DIASTOLIC BLOOD PRESSURE: 81 MMHG

## 2023-09-21 DIAGNOSIS — J44.9 COPD (CHRONIC OBSTRUCTIVE PULMONARY DISEASE) (HCC): Primary | ICD-10-CM

## 2023-09-21 LAB
ALBUMIN SERPL BCP-MCNC: 4.5 G/DL (ref 3.5–5)
ALP SERPL-CCNC: 44 U/L (ref 34–104)
ALT SERPL W P-5'-P-CCNC: 13 U/L (ref 7–52)
ANION GAP SERPL CALCULATED.3IONS-SCNC: 6 MMOL/L
AST SERPL W P-5'-P-CCNC: 13 U/L (ref 13–39)
ATRIAL RATE: 77 BPM
BASOPHILS # BLD AUTO: 0.06 THOUSANDS/ÂΜL (ref 0–0.1)
BASOPHILS NFR BLD AUTO: 1 % (ref 0–1)
BILIRUB SERPL-MCNC: 0.48 MG/DL (ref 0.2–1)
BNP SERPL-MCNC: 25 PG/ML (ref 0–100)
BUN SERPL-MCNC: 18 MG/DL (ref 5–25)
CALCIUM SERPL-MCNC: 9.3 MG/DL (ref 8.4–10.2)
CARDIAC TROPONIN I PNL SERPL HS: <2 NG/L
CHLORIDE SERPL-SCNC: 103 MMOL/L (ref 96–108)
CO2 SERPL-SCNC: 28 MMOL/L (ref 21–32)
CREAT SERPL-MCNC: 0.91 MG/DL (ref 0.6–1.3)
EOSINOPHIL # BLD AUTO: 0.05 THOUSAND/ÂΜL (ref 0–0.61)
EOSINOPHIL NFR BLD AUTO: 0 % (ref 0–6)
ERYTHROCYTE [DISTWIDTH] IN BLOOD BY AUTOMATED COUNT: 12.8 % (ref 11.6–15.1)
GFR SERPL CREATININE-BSD FRML MDRD: 90 ML/MIN/1.73SQ M
GLUCOSE SERPL-MCNC: 117 MG/DL (ref 65–140)
HCT VFR BLD AUTO: 45.9 % (ref 36.5–49.3)
HGB BLD-MCNC: 15.8 G/DL (ref 12–17)
IMM GRANULOCYTES # BLD AUTO: 0.1 THOUSAND/UL (ref 0–0.2)
IMM GRANULOCYTES NFR BLD AUTO: 1 % (ref 0–2)
INR PPP: 0.93 (ref 0.84–1.19)
LYMPHOCYTES # BLD AUTO: 0.91 THOUSANDS/ÂΜL (ref 0.6–4.47)
LYMPHOCYTES NFR BLD AUTO: 7 % (ref 14–44)
MCH RBC QN AUTO: 33.3 PG (ref 26.8–34.3)
MCHC RBC AUTO-ENTMCNC: 34.4 G/DL (ref 31.4–37.4)
MCV RBC AUTO: 97 FL (ref 82–98)
MONOCYTES # BLD AUTO: 0.9 THOUSAND/ÂΜL (ref 0.17–1.22)
MONOCYTES NFR BLD AUTO: 7 % (ref 4–12)
NEUTROPHILS # BLD AUTO: 10.89 THOUSANDS/ÂΜL (ref 1.85–7.62)
NEUTS SEG NFR BLD AUTO: 84 % (ref 43–75)
NRBC BLD AUTO-RTO: 0 /100 WBCS
P AXIS: 79 DEGREES
PLATELET # BLD AUTO: 339 THOUSANDS/UL (ref 149–390)
PMV BLD AUTO: 9.1 FL (ref 8.9–12.7)
POTASSIUM SERPL-SCNC: 3.9 MMOL/L (ref 3.5–5.3)
PR INTERVAL: 124 MS
PROT SERPL-MCNC: 7.2 G/DL (ref 6.4–8.4)
PROTHROMBIN TIME: 13 SECONDS (ref 11.6–14.5)
QRS AXIS: 78 DEGREES
QRSD INTERVAL: 106 MS
QT INTERVAL: 388 MS
QTC INTERVAL: 439 MS
RBC # BLD AUTO: 4.74 MILLION/UL (ref 3.88–5.62)
SODIUM SERPL-SCNC: 137 MMOL/L (ref 135–147)
T WAVE AXIS: 67 DEGREES
TSH SERPL DL<=0.05 MIU/L-ACNC: 0.83 UIU/ML (ref 0.45–4.5)
VENTRICULAR RATE: 77 BPM
WBC # BLD AUTO: 12.91 THOUSAND/UL (ref 4.31–10.16)

## 2023-09-21 PROCEDURE — 80053 COMPREHEN METABOLIC PANEL: CPT | Performed by: EMERGENCY MEDICINE

## 2023-09-21 PROCEDURE — 93005 ELECTROCARDIOGRAM TRACING: CPT

## 2023-09-21 PROCEDURE — 94640 AIRWAY INHALATION TREATMENT: CPT

## 2023-09-21 PROCEDURE — 99284 EMERGENCY DEPT VISIT MOD MDM: CPT | Performed by: EMERGENCY MEDICINE

## 2023-09-21 PROCEDURE — 84443 ASSAY THYROID STIM HORMONE: CPT | Performed by: EMERGENCY MEDICINE

## 2023-09-21 PROCEDURE — 85025 COMPLETE CBC W/AUTO DIFF WBC: CPT | Performed by: EMERGENCY MEDICINE

## 2023-09-21 PROCEDURE — 93010 ELECTROCARDIOGRAM REPORT: CPT | Performed by: INTERNAL MEDICINE

## 2023-09-21 PROCEDURE — 84484 ASSAY OF TROPONIN QUANT: CPT | Performed by: EMERGENCY MEDICINE

## 2023-09-21 PROCEDURE — 85610 PROTHROMBIN TIME: CPT | Performed by: EMERGENCY MEDICINE

## 2023-09-21 PROCEDURE — 83880 ASSAY OF NATRIURETIC PEPTIDE: CPT | Performed by: EMERGENCY MEDICINE

## 2023-09-21 PROCEDURE — 36415 COLL VENOUS BLD VENIPUNCTURE: CPT | Performed by: EMERGENCY MEDICINE

## 2023-09-21 PROCEDURE — 71046 X-RAY EXAM CHEST 2 VIEWS: CPT

## 2023-09-21 PROCEDURE — 99285 EMERGENCY DEPT VISIT HI MDM: CPT

## 2023-09-21 RX ORDER — AZITHROMYCIN 500 MG/1
500 TABLET, FILM COATED ORAL DAILY
Qty: 7 TABLET | Refills: 0 | Status: SHIPPED | OUTPATIENT
Start: 2023-09-21 | End: 2023-09-28

## 2023-09-21 RX ORDER — CARVEDILOL 3.12 MG/1
TABLET ORAL
Qty: 180 TABLET | Refills: 3 | Status: SHIPPED | OUTPATIENT
Start: 2023-09-21

## 2023-09-21 RX ORDER — ALBUTEROL SULFATE 2.5 MG/3ML
2.5 SOLUTION RESPIRATORY (INHALATION) EVERY 6 HOURS PRN
Qty: 75 ML | Refills: 0 | Status: SHIPPED | OUTPATIENT
Start: 2023-09-21

## 2023-09-21 RX ORDER — IPRATROPIUM BROMIDE AND ALBUTEROL SULFATE 2.5; .5 MG/3ML; MG/3ML
3 SOLUTION RESPIRATORY (INHALATION) ONCE
Status: COMPLETED | OUTPATIENT
Start: 2023-09-21 | End: 2023-09-21

## 2023-09-21 RX ORDER — LOSARTAN POTASSIUM 25 MG/1
25 TABLET ORAL DAILY
Qty: 90 TABLET | Refills: 3 | Status: SHIPPED | OUTPATIENT
Start: 2023-09-21

## 2023-09-21 RX ORDER — ALBUTEROL SULFATE 2.5 MG/3ML
2.5 SOLUTION RESPIRATORY (INHALATION) ONCE
Status: COMPLETED | OUTPATIENT
Start: 2023-09-21 | End: 2023-09-21

## 2023-09-21 RX ORDER — PREDNISONE 20 MG/1
60 TABLET ORAL DAILY
Qty: 15 TABLET | Refills: 0 | Status: SHIPPED | OUTPATIENT
Start: 2023-09-21 | End: 2023-09-26

## 2023-09-21 RX ORDER — IPRATROPIUM BROMIDE AND ALBUTEROL SULFATE 2.5; .5 MG/3ML; MG/3ML
3 SOLUTION RESPIRATORY (INHALATION) 2 TIMES DAILY
Qty: 180 ML | Refills: 0 | Status: SHIPPED | OUTPATIENT
Start: 2023-09-21 | End: 2023-10-21

## 2023-09-21 RX ADMIN — ALBUTEROL SULFATE 2.5 MG: 2.5 SOLUTION RESPIRATORY (INHALATION) at 11:30

## 2023-09-21 RX ADMIN — IPRATROPIUM BROMIDE AND ALBUTEROL SULFATE 3 ML: 2.5; .5 SOLUTION RESPIRATORY (INHALATION) at 11:29

## 2023-09-21 NOTE — ED NOTES
Patient transported to [address] [place], [place] [address], RN  09/21/23 4653

## 2023-09-21 NOTE — ED PROVIDER NOTES
History  Chief Complaint   Patient presents with   • Shortness of Breath     Was a urgent care yesterday and diagnosed with sinus infection but now feeling short of breath     56-year-old male patient presents emergency department for evaluation of shortness of breath. The patient has a history of COPD, and recurrent pneumonia approximately once every 4 years. The patient states this is normal for him. He has been afebrile, with no other associated complaints. The patient does describe some chest discomfort, however,. The differential diagnosis currently includes was not limited to COPD, CHF, pneumonia. History provided by:  Patient   used: No    Shortness of Breath  Severity:  Mild  Onset quality:  Gradual  Timing:  Constant  Progression:  Worsening  Chronicity:  New  Context: not emotional upset, not pollens and not URI    Relieved by:  Nothing  Worsened by:  Nothing  Ineffective treatments:  None tried  Associated symptoms: no abdominal pain, no cough and no headaches    Risk factors: no recent alcohol use        Prior to Admission Medications   Prescriptions Last Dose Informant Patient Reported? Taking?    Eliquis 5 MG   No No   Sig: TAKE ONE TABLET BY MOUTH TWICE A DAY   albuterol (PROAIR HFA) 90 mcg/act inhaler  Self No No   Sig: Inhale 1 puff every 4 (four) hours as needed for wheezing or shortness of breath   aspirin 81 MG tablet  Self Yes No   Sig: Take 1 tablet by mouth daily   benzonatate (TESSALON PERLES) 100 mg capsule   No No   Sig: Take 1 capsule (100 mg total) by mouth every 8 (eight) hours   carbamide peroxide (DEBROX) 6.5 % otic solution   No No   Sig: Administer 5 drops into ears 2 (two) times a day   fluticasone-salmeterol (Advair) 250-50 mcg/dose inhaler  Self Yes No   Sig: Inhale   montelukast (SINGULAIR) 10 mg tablet  Self Yes No   Sig: Take by mouth   umeclidinium-vilanterol (Anoro Ellipta) 62.5-25 MCG/INH inhaler  Self Yes No   Sig: Anoro Ellipta 62.5 mcg-25 mcg/actuation powder for inhalation   INHALE ONE PUFF BY MOUTH EVERY DAY      Facility-Administered Medications: None       Past Medical History:   Diagnosis Date   • Asthma    • Cardiomyopathy ([address])    • COPD (chronic obstructive pulmonary disease) ([address])    • Hypertension        History reviewed. No pertinent surgical history. Family History   Problem Relation Age of Onset   • Deep vein thrombosis Father      I have reviewed and agree with the history as documented. E-Cigarette/Vaping   • E-Cigarette Use Never User      E-Cigarette/Vaping Substances     Social History     Tobacco Use   • Smoking status: Every Day     Packs/day: 0.50     Types: Cigarettes   • Smokeless tobacco: Never   Vaping Use   • Vaping Use: Never used   Substance Use Topics   • Alcohol use: No   • Drug use: No       Review of Systems   Respiratory: Positive for shortness of breath. Negative for cough. Gastrointestinal: Negative for abdominal pain. Neurological: Negative for headaches. All other systems reviewed and are negative. Physical Exam  Physical Exam  Vitals and nursing note reviewed. Constitutional:       General: He is not in acute distress. Appearance: He is well-developed. He is not diaphoretic. HENT:      Head: Normocephalic and atraumatic. Right Ear: External ear normal.      Left Ear: External ear normal.   Eyes:      General: No scleral icterus. Right eye: No discharge. Left eye: No discharge. Conjunctiva/sclera: Conjunctivae normal.   Neck:      Thyroid: No thyromegaly. Vascular: No JVD. Trachea: No tracheal deviation. Cardiovascular:      Rate and Rhythm: Normal rate and regular rhythm. Pulmonary:      Effort: Pulmonary effort is normal. No respiratory distress. Breath sounds: Normal breath sounds. No stridor. No wheezing or rales. Abdominal:      General: Bowel sounds are normal. There is no distension. Palpations: Abdomen is soft.       Tenderness: There is no abdominal tenderness. Musculoskeletal:         General: No tenderness or deformity. Normal range of motion. Cervical back: Normal range of motion and neck supple. Skin:     General: Skin is warm and dry. Neurological:      Mental Status: He is alert and oriented to person, place, and time. Cranial Nerves: No cranial nerve deficit.       Coordination: Coordination normal.   Psychiatric:         Behavior: Behavior normal.         Vital Signs  ED Triage Vitals   Temperature Pulse Respirations Blood Pressure SpO2   09/21/23 1055 09/21/23 1055 09/21/23 1055 09/21/23 1055 09/21/23 1055   (!) 97 °F (36.1 °C) 67 18 158/81 97 %      Temp Source Heart Rate Source Patient Position - Orthostatic VS BP Location FiO2 (%)   09/21/23 1055 09/21/23 1055 09/21/23 1055 09/21/23 1055 --   Tympanic Monitor Sitting Left arm       Pain Score       09/21/23 1100       No Pain           Vitals:    09/21/23 1055   BP: 158/81   Pulse: 67   Patient Position - Orthostatic VS: Sitting         Visual Acuity      ED Medications  Medications   albuterol inhalation solution 2.5 mg (2.5 mg Nebulization Given 9/21/23 1130)   ipratropium-albuterol (DUO-NEB) 0.5-2.5 mg/3 mL inhalation solution 3 mL (3 mL Nebulization Given 9/21/23 1129)       Diagnostic Studies  Results Reviewed     Procedure Component Value Units Date/Time    TSH, 3rd generation with Free T4 reflex [[id number]]  (Normal) Collected: 09/21/23 1133    Lab Status: Final result Specimen: Blood from Arm, Right Updated: 09/21/23 1215     TSH 3RD GENERATON 0.832 uIU/mL     B-Type Natriuretic Peptide(BNP) [[id number]]  (Normal) Collected: 09/21/23 1133    Lab Status: Final result Specimen: Blood from Arm, Right Updated: 09/21/23 1205     BNP 25 pg/mL     HS Troponin 0hr (reflex protocol) [[id number]]  (Normal) Collected: 09/21/23 1133    Lab Status: Final result Specimen: Blood from Arm, Right Updated: 09/21/23 1205     hs TnI 0hr <2 ng/L     Protime-INR [[id number]] (Normal) Collected: 09/21/23 1133    Lab Status: Final result Specimen: Blood from Arm, Right Updated: 09/21/23 1203     Protime 13.0 seconds      INR 0.93    Comprehensive metabolic panel [[id number]] Collected: 09/21/23 1133    Lab Status: Final result Specimen: Blood from Arm, Right Updated: 09/21/23 1156     Sodium 137 mmol/L      Potassium 3.9 mmol/L      Chloride 103 mmol/L      CO2 28 mmol/L      ANION GAP 6 mmol/L      BUN 18 mg/dL      Creatinine 0.91 mg/dL      Glucose 117 mg/dL      Calcium 9.3 mg/dL      AST 13 U/L      ALT 13 U/L      Alkaline Phosphatase 44 U/L      Total Protein 7.2 g/dL      Albumin 4.5 g/dL      Total Bilirubin 0.48 mg/dL      eGFR 90 ml/min/1.73sq m     Narrative:      National Kidney Disease Foundation guidelines for Chronic Kidney Disease (CKD):   •  Stage 1 with normal or high GFR (GFR > 90 mL/min/1.73 square meters)  •  Stage 2 Mild CKD (GFR = 60-89 mL/min/1.73 square meters)  •  Stage 3A Moderate CKD (GFR = 45-59 mL/min/1.73 square meters)  •  Stage 3B Moderate CKD (GFR = 30-44 mL/min/1.73 square meters)  •  Stage 4 Severe CKD (GFR = 15-29 mL/min/1.73 square meters)  •  Stage 5 End Stage CKD (GFR <15 mL/min/1.73 square meters)  Note: GFR calculation is accurate only with a steady state creatinine    CBC and differential [[id number]]  (Abnormal) Collected: 09/21/23 1133    Lab Status: Final result Specimen: Blood from Arm, Right Updated: 09/21/23 1143     WBC 12.91 Thousand/uL      RBC 4.74 Million/uL      Hemoglobin 15.8 g/dL      Hematocrit 45.9 %      MCV 97 fL      MCH 33.3 pg      MCHC 34.4 g/dL      RDW 12.8 %      MPV 9.1 fL      Platelets 001 Thousands/uL      nRBC 0 /100 WBCs      Neutrophils Relative 84 %      Immat GRANS % 1 %      Lymphocytes Relative 7 %      Monocytes Relative 7 %      Eosinophils Relative 0 %      Basophils Relative 1 %      Neutrophils Absolute 10.89 Thousands/µL      Immature Grans Absolute 0.10 Thousand/uL      Lymphocytes Absolute 0.91 Thousands/µL      Monocytes Absolute 0.90 Thousand/µL      Eosinophils Absolute 0.05 Thousand/µL      Basophils Absolute 0.06 Thousands/µL                  XR chest 2 views   Final Result by Micaela Lau MD (09/21 1148)      No acute cardiopulmonary disease. Workstation performed: [id number]                    Procedures  Procedures         ED Course                               SBIRT 22yo+    Flowsheet Row Most Recent Value   Initial Alcohol Screen: US AUDIT-C     1. How often do you have a drink containing alcohol? 0 Filed at: 09/21/2023 1054   2. How many drinks containing alcohol do you have on a typical day you are drinking? 0 Filed at: 09/21/2023 1054   3a. Male UNDER 65: How often do you have five or more drinks on one occasion? 0 Filed at: 09/21/2023 1054   Audit-C Score 0 Filed at: 09/21/2023 1054   [place]OEN[place]: How many times in the past year have you. .. Used an illegal drug or used a prescription medication for non-medical reasons? Never Filed at: 09/21/2023 1054                    MDM    Disposition  Final diagnoses:   COPD (chronic obstructive pulmonary disease) ([address] )     Time reflects when diagnosis was documented in both MDM as applicable and the Disposition within this note     Time User Action Codes Description Comment    9/21/2023 12:58 PM Yan Helm Add [J44.9] COPD (chronic obstructive pulmonary disease) [facility])       ED Disposition     ED Disposition   Discharge    Condition   Stable    Date/Time   Thu Sep 21, 2023 12:58 PM    Comment   Eliza Castorena discharge to home/self care.                Follow-up Information     Follow up With Specialties Details Why Contact Info Additional Information    [address] [place] [address] Emergency Department Emergency Medicine   [address] [place] [address] [facility] Emergency Department, [place], [place], [address]          Discharge Medication List as of 9/21/2023 12:59 PM      START taking these medications    Details   albuterol (2.5 mg/3 mL) 0.083 % nebulizer solution Take 3 mL (2.5 mg total) by nebulization every 6 (six) hours as needed for wheezing or shortness of breath, Starting Thu 9/21/2023, Normal      azithromycin (ZITHROMAX) 500 MG tablet Take 1 tablet (500 mg total) by mouth daily for 7 days, Starting Th 9/21/2023, Until Thu 9/28/2023, Normal      ipratropium-albuterol (DUO-NEB) 0.5-2.5 mg/3 mL nebulizer solution Take 3 mL by nebulization 2 (two) times a day, Starting Th 9/21/2023, Until Sat 10/21/2023, Normal      predniSONE 20 mg tablet Take 3 tablets (60 mg total) by mouth daily for 5 days, Starting Thu 9/21/2023, Until Tue 9/26/2023, Normal         CONTINUE these medications which have NOT CHANGED    Details   albuterol (PROAIR HFA) 90 mcg/act inhaler Inhale 1 puff every 4 (four) hours as needed for wheezing or shortness of breath, Starting Tue 2/6/2018, Normal      aspirin 81 MG tablet Take 1 tablet by mouth daily, Starting Thu 2/19/2015, Historical Med      benzonatate (TESSALON PERLES) 100 mg capsule Take 1 capsule (100 mg total) by mouth every 8 (eight) hours, Starting Tue 12/13/2022, Normal      carbamide peroxide (DEBROX) 6.5 % otic solution Administer 5 drops into ears 2 (two) times a day, Starting Tue 12/13/2022, Normal      Eliquis 5 MG TAKE ONE TABLET BY MOUTH TWICE A DAY, Normal      fluticasone-salmeterol (Advair) 250-50 mcg/dose inhaler Inhale, Historical Med      montelukast (SINGULAIR) 10 mg tablet Take by mouth, Historical Med      umeclidinium-vilanterol (Anoro Ellipta) 62.5-25 MCG/INH inhaler Anoro Ellipta 62.5 mcg-25 mcg/actuation powder for inhalation   INHALE ONE PUFF BY MOUTH EVERY DAY, Historical Med      carvedilol (COREG) 3.125 mg tablet TAKE ONE TABLET BY MOUTH TWICE A DAY, Normal      losartan (COZAAR) 25 mg tablet TAKE ONE TABLET BY MOUTH EVERY DAY, Normal             No discharge procedures on file.     PDMP Review None          ED Provider  Electronically Signed by           Samantha Reed,   09/23/23 3875

## 2024-02-29 DIAGNOSIS — R06.00 DYSPNEA: ICD-10-CM

## 2024-02-29 RX ORDER — APIXABAN 5 MG/1
TABLET, FILM COATED ORAL
Qty: 180 TABLET | Refills: 3 | Status: SHIPPED | OUTPATIENT
Start: 2024-02-29

## 2024-08-29 ENCOUNTER — HOSPITAL ENCOUNTER (EMERGENCY)
Facility: HOSPITAL | Age: 64
Discharge: HOME | End: 2024-08-29
Attending: EMERGENCY MEDICINE

## 2024-08-29 ENCOUNTER — APPOINTMENT (EMERGENCY)
Dept: RADIOLOGY | Facility: HOSPITAL | Age: 64
End: 2024-08-29

## 2024-08-29 VITALS
DIASTOLIC BLOOD PRESSURE: 75 MMHG | BODY MASS INDEX: 23.1 KG/M2 | TEMPERATURE: 97.8 F | RESPIRATION RATE: 22 BRPM | OXYGEN SATURATION: 94 % | SYSTOLIC BLOOD PRESSURE: 135 MMHG | HEART RATE: 78 BPM | WEIGHT: 161 LBS

## 2024-08-29 DIAGNOSIS — F41.9 ANXIETY: ICD-10-CM

## 2024-08-29 DIAGNOSIS — R06.02 SOB (SHORTNESS OF BREATH): Primary | ICD-10-CM

## 2024-08-29 DIAGNOSIS — J98.01 ACUTE BRONCHOSPASM: ICD-10-CM

## 2024-08-29 LAB
2HR DELTA HS TROPONIN: <-2 NG/L
ALBUMIN SERPL BCG-MCNC: 4.3 G/DL (ref 3.5–5)
ALP SERPL-CCNC: 56 U/L (ref 34–104)
ALT SERPL W P-5'-P-CCNC: 13 U/L (ref 7–52)
ANION GAP SERPL CALCULATED.3IONS-SCNC: 5 MMOL/L (ref 4–13)
AST SERPL W P-5'-P-CCNC: 17 U/L (ref 13–39)
ATRIAL RATE: 94 BPM
BASOPHILS # BLD AUTO: 0.15 THOUSANDS/ÂΜL (ref 0–0.1)
BASOPHILS NFR BLD AUTO: 2 % (ref 0–1)
BILIRUB SERPL-MCNC: 0.37 MG/DL (ref 0.2–1)
BUN SERPL-MCNC: 16 MG/DL (ref 5–25)
CALCIUM SERPL-MCNC: 9.2 MG/DL (ref 8.4–10.2)
CARDIAC TROPONIN I PNL SERPL HS: 4 NG/L
CARDIAC TROPONIN I PNL SERPL HS: <2 NG/L
CHLORIDE SERPL-SCNC: 106 MMOL/L (ref 96–108)
CO2 SERPL-SCNC: 29 MMOL/L (ref 21–32)
CREAT SERPL-MCNC: 1 MG/DL (ref 0.6–1.3)
EOSINOPHIL # BLD AUTO: 0.31 THOUSAND/ÂΜL (ref 0–0.61)
EOSINOPHIL NFR BLD AUTO: 4 % (ref 0–6)
ERYTHROCYTE [DISTWIDTH] IN BLOOD BY AUTOMATED COUNT: 12.8 % (ref 11.6–15.1)
FLUAV RNA RESP QL NAA+PROBE: NEGATIVE
FLUBV RNA RESP QL NAA+PROBE: NEGATIVE
GFR SERPL CREATININE-BSD FRML MDRD: 80 ML/MIN/1.73SQ M
GLUCOSE SERPL-MCNC: 106 MG/DL (ref 65–140)
HCT VFR BLD AUTO: 42.5 % (ref 36.5–49.3)
HGB BLD-MCNC: 14.5 G/DL (ref 12–17)
IMM GRANULOCYTES # BLD AUTO: 0.03 THOUSAND/UL (ref 0–0.2)
IMM GRANULOCYTES NFR BLD AUTO: 0 % (ref 0–2)
LYMPHOCYTES # BLD AUTO: 1.85 THOUSANDS/ÂΜL (ref 0.6–4.47)
LYMPHOCYTES NFR BLD AUTO: 24 % (ref 14–44)
MCH RBC QN AUTO: 32.5 PG (ref 26.8–34.3)
MCHC RBC AUTO-ENTMCNC: 34.1 G/DL (ref 31.4–37.4)
MCV RBC AUTO: 95 FL (ref 82–98)
MONOCYTES # BLD AUTO: 1.14 THOUSAND/ÂΜL (ref 0.17–1.22)
MONOCYTES NFR BLD AUTO: 15 % (ref 4–12)
NEUTROPHILS # BLD AUTO: 4.16 THOUSANDS/ÂΜL (ref 1.85–7.62)
NEUTS SEG NFR BLD AUTO: 55 % (ref 43–75)
NRBC BLD AUTO-RTO: 0 /100 WBCS
P AXIS: 78 DEGREES
PLATELET # BLD AUTO: 366 THOUSANDS/UL (ref 149–390)
PMV BLD AUTO: 9.1 FL (ref 8.9–12.7)
POTASSIUM SERPL-SCNC: 5.2 MMOL/L (ref 3.5–5.3)
PR INTERVAL: 138 MS
PROT SERPL-MCNC: 7.2 G/DL (ref 6.4–8.4)
QRS AXIS: 71 DEGREES
QRSD INTERVAL: 90 MS
QT INTERVAL: 358 MS
QTC INTERVAL: 447 MS
RBC # BLD AUTO: 4.46 MILLION/UL (ref 3.88–5.62)
RSV RNA RESP QL NAA+PROBE: NEGATIVE
SARS-COV-2 RNA RESP QL NAA+PROBE: NEGATIVE
SODIUM SERPL-SCNC: 140 MMOL/L (ref 135–147)
T WAVE AXIS: 64 DEGREES
VENTRICULAR RATE: 94 BPM
WBC # BLD AUTO: 7.64 THOUSAND/UL (ref 4.31–10.16)

## 2024-08-29 PROCEDURE — 80053 COMPREHEN METABOLIC PANEL: CPT | Performed by: EMERGENCY MEDICINE

## 2024-08-29 PROCEDURE — 93010 ELECTROCARDIOGRAM REPORT: CPT | Performed by: STUDENT IN AN ORGANIZED HEALTH CARE EDUCATION/TRAINING PROGRAM

## 2024-08-29 PROCEDURE — 36415 COLL VENOUS BLD VENIPUNCTURE: CPT

## 2024-08-29 PROCEDURE — 99285 EMERGENCY DEPT VISIT HI MDM: CPT | Performed by: PHYSICIAN ASSISTANT

## 2024-08-29 PROCEDURE — 71046 X-RAY EXAM CHEST 2 VIEWS: CPT

## 2024-08-29 PROCEDURE — 0241U HB NFCT DS VIR RESP RNA 4 TRGT: CPT | Performed by: EMERGENCY MEDICINE

## 2024-08-29 PROCEDURE — 99285 EMERGENCY DEPT VISIT HI MDM: CPT

## 2024-08-29 PROCEDURE — 85025 COMPLETE CBC W/AUTO DIFF WBC: CPT | Performed by: EMERGENCY MEDICINE

## 2024-08-29 PROCEDURE — 93005 ELECTROCARDIOGRAM TRACING: CPT

## 2024-08-29 PROCEDURE — 84484 ASSAY OF TROPONIN QUANT: CPT | Performed by: EMERGENCY MEDICINE

## 2024-08-29 RX ORDER — LORAZEPAM 0.5 MG/1
0.5 TABLET ORAL
Qty: 30 TABLET | Refills: 0 | Status: SHIPPED | OUTPATIENT
Start: 2024-08-29 | End: 2024-09-28

## 2024-08-29 RX ORDER — AZITHROMYCIN 250 MG/1
TABLET, FILM COATED ORAL
Qty: 6 TABLET | Refills: 0 | Status: SHIPPED | OUTPATIENT
Start: 2024-08-29 | End: 2024-09-02

## 2024-08-29 RX ORDER — PREDNISONE 20 MG/1
20 TABLET ORAL ONCE
Status: COMPLETED | OUTPATIENT
Start: 2024-08-29 | End: 2024-08-29

## 2024-08-29 RX ORDER — PREDNISONE 5 MG/1
TABLET ORAL
Qty: 60 TABLET | Refills: 0 | Status: SHIPPED | OUTPATIENT
Start: 2024-08-29

## 2024-08-29 RX ADMIN — PREDNISONE 20 MG: 20 TABLET ORAL at 09:42

## 2024-08-29 NOTE — ED PROVIDER NOTES
History  Chief Complaint   Patient presents with    Shortness of Breath     Pt states sob starting at 0300 feeling like he cannot breathe, hx asthma and emphysema. Tried nebulizer and inhaler with no relief      63-year-old male with past medical history significant for asthma, COPD, HTN and cardiomyopathy presents to the emergency department for evaluation of shortness of breath that started suddenly at 0300 this morning.  Denies fevers, chills, nausea, vomiting, chest pain, abdominal pain, syncope or rash.   Reports he tried using nebulizer at home without relief.   States sitting upright improved symptoms.  Currently on evaluation, patient reports symptoms have resolved.       History provided by:  Patient   used: No    Shortness of Breath  Associated symptoms: no abdominal pain, no chest pain, no fever, no rash and no vomiting        Prior to Admission Medications   Prescriptions Last Dose Informant Patient Reported? Taking?   Eliquis 5 MG   No No   Sig: TAKE ONE TABLET BY MOUTH TWICE A DAY   albuterol (2.5 mg/3 mL) 0.083 % nebulizer solution   No No   Sig: Take 3 mL (2.5 mg total) by nebulization every 6 (six) hours as needed for wheezing or shortness of breath   albuterol (PROAIR HFA) 90 mcg/act inhaler  Self No No   Sig: Inhale 1 puff every 4 (four) hours as needed for wheezing or shortness of breath   aspirin 81 MG tablet  Self Yes No   Sig: Take 1 tablet by mouth daily   benzonatate (TESSALON PERLES) 100 mg capsule   No No   Sig: Take 1 capsule (100 mg total) by mouth every 8 (eight) hours   carbamide peroxide (DEBROX) 6.5 % otic solution   No No   Sig: Administer 5 drops into ears 2 (two) times a day   carvedilol (COREG) 3.125 mg tablet   No No   Sig: TAKE ONE TABLET BY MOUTH TWICE A DAY   fluticasone-salmeterol (Advair) 250-50 mcg/dose inhaler  Self Yes No   Sig: Inhale   losartan (COZAAR) 25 mg tablet   No No   Sig: Take 1 tablet (25 mg total) by mouth daily   montelukast (SINGULAIR)  10 mg tablet  Self Yes No   Sig: Take by mouth   umeclidinium-vilanterol (Anoro Ellipta) 62.5-25 MCG/INH inhaler  Self Yes No   Sig: Anoro Ellipta 62.5 mcg-25 mcg/actuation powder for inhalation   INHALE ONE PUFF BY MOUTH EVERY DAY      Facility-Administered Medications: None       Past Medical History:   Diagnosis Date    Asthma     Cardiomyopathy (HCC)     COPD (chronic obstructive pulmonary disease) (HCC)     Hypertension        History reviewed. No pertinent surgical history.    Family History   Problem Relation Age of Onset    Deep vein thrombosis Father      I have reviewed and agree with the history as documented.    E-Cigarette/Vaping    E-Cigarette Use Never User      E-Cigarette/Vaping Substances     Social History     Tobacco Use    Smoking status: Every Day     Current packs/day: 0.50     Types: Cigarettes    Smokeless tobacco: Never   Vaping Use    Vaping status: Never Used   Substance Use Topics    Alcohol use: No    Drug use: No       Review of Systems   Constitutional:  Negative for fever.   Respiratory:  Positive for shortness of breath. Negative for chest tightness and stridor.    Cardiovascular:  Negative for chest pain, palpitations and leg swelling.   Gastrointestinal:  Negative for abdominal pain, diarrhea, nausea and vomiting.   Genitourinary:  Negative for decreased urine volume and difficulty urinating.   Musculoskeletal:  Negative for gait problem.   Skin:  Negative for rash.   Neurological:  Negative for seizures, syncope, facial asymmetry and numbness.   All other systems reviewed and are negative.      Physical Exam  Physical Exam  Vitals and nursing note reviewed.   Constitutional:       General: He is not in acute distress.     Appearance: Normal appearance.   HENT:      Head: Normocephalic and atraumatic.      Right Ear: External ear normal.      Left Ear: External ear normal.      Nose: Nose normal.   Eyes:      General: No scleral icterus.        Right eye: No discharge.          Left eye: No discharge.   Cardiovascular:      Rate and Rhythm: Normal rate and regular rhythm.      Pulses: Normal pulses.      Heart sounds: Normal heart sounds.   Pulmonary:      Effort: Pulmonary effort is normal.      Breath sounds: Normal breath sounds.   Abdominal:      Tenderness: There is no abdominal tenderness. There is no guarding or rebound.   Musculoskeletal:         General: No tenderness, deformity or signs of injury.      Cervical back: Normal range of motion and neck supple.   Skin:     General: Skin is dry.      Capillary Refill: Capillary refill takes less than 2 seconds.      Coloration: Skin is not jaundiced.      Findings: No erythema or rash.   Neurological:      General: No focal deficit present.      Mental Status: He is alert and oriented to person, place, and time. Mental status is at baseline.      Motor: No weakness.      Gait: Gait normal.   Psychiatric:         Mood and Affect: Mood normal.         Behavior: Behavior normal.         Thought Content: Thought content normal.         Vital Signs  ED Triage Vitals [08/29/24 0600]   Temperature Pulse Respirations Blood Pressure SpO2   97.8 °F (36.6 °C) 90 20 (!) 173/94 97 %      Temp Source Heart Rate Source Patient Position - Orthostatic VS BP Location FiO2 (%)   Oral Monitor Sitting Left arm --      Pain Score       --           Vitals:    08/29/24 0600 08/29/24 0659 08/29/24 0800 08/29/24 0830   BP: (!) 173/94 141/71 129/66 135/75   Pulse: 90 78 76 78   Patient Position - Orthostatic VS: Sitting Sitting Sitting Sitting         Visual Acuity      ED Medications  Medications   predniSONE tablet 20 mg (20 mg Oral Given 8/29/24 0942)       Diagnostic Studies  Results Reviewed       Procedure Component Value Units Date/Time    HS Troponin I 2hr [[id number]]  (Normal) Collected: 08/29/24 0801    Lab Status: Final result Specimen: Blood from Arm, Left Updated: 08/29/24 0856     hs TnI 2hr <2 ng/L      Delta 2hr hsTnI <-2 ng/L     HS Troponin I  4hr [[id number]]     Lab Status: No result Specimen: Blood     FLU/RSV/COVID - if FLU/RSV clinically relevant [[id number]]  (Normal) Collected: 08/29/24 0612    Lab Status: Final result Specimen: Nares from Nose Updated: 08/29/24 0655     SARS-CoV-2 Negative     INFLUENZA A PCR Negative     INFLUENZA B PCR Negative     RSV PCR Negative    Narrative:      This test has been performed using the CoV-2/Flu/RSV plus assay on the [employer] platform. This test has been validated by the  and verified by the performing laboratory.     This test is designed to amplify and detect the following: nucleocapsid (N), envelope (E), and RNA-dependent RNA polymerase (RdRP) genes of the SARS-CoV-2 genome; matrix (M), basic polymerase (PB2), and acidic protein (PA) segments of the influenza A genome; matrix (M) and non-structural protein (NS) segments of the influenza B genome, and the nucleocapsid genes of RSV A and RSV B.     Positive results are indicative of the presence of Flu A, Flu B, RSV, and/or SARS-CoV-2 RNA. Positive results for SARS-CoV-2 or suspected novel influenza should be reported to state, local, or federal health departments according to local reporting requirements.      All results should be assessed in conjunction with clinical presentation and other laboratory markers for clinical management.     FOR PEDIATRIC PATIENTS - copy/paste COVID Guidelines URL to browser: [email or web address]       HS Troponin 0hr (reflex protocol) [[id number]]  (Normal) Collected: 08/29/24 0612    Lab Status: Final result Specimen: Blood from Arm, Left Updated: 08/29/24 0642     hs TnI 0hr 4 ng/L     Comprehensive metabolic panel [[id number]] Collected: 08/29/24 0612    Lab Status: Final result Specimen: Blood from Arm, Left Updated: 08/29/24 0635     Sodium 140 mmol/L      Potassium 5.2 mmol/L      Chloride 106 mmol/L      CO2 29 mmol/L      ANION GAP 5 mmol/L       BUN 16 mg/dL      Creatinine 1.00 mg/dL      Glucose 106 mg/dL      Calcium 9.2 mg/dL      AST 17 U/L      ALT 13 U/L      Alkaline Phosphatase 56 U/L      Total Protein 7.2 g/dL      Albumin 4.3 g/dL      Total Bilirubin 0.37 mg/dL      eGFR 80 ml/min/1.73sq m     Narrative:      National Kidney Disease Foundation guidelines for Chronic Kidney Disease (CKD):     Stage 1 with normal or high GFR (GFR > 90 mL/min/1.73 square meters)    Stage 2 Mild CKD (GFR = 60-89 mL/min/1.73 square meters)    Stage 3A Moderate CKD (GFR = 45-59 mL/min/1.73 square meters)    Stage 3B Moderate CKD (GFR = 30-44 mL/min/1.73 square meters)    Stage 4 Severe CKD (GFR = 15-29 mL/min/1.73 square meters)    Stage 5 End Stage CKD (GFR <15 mL/min/1.73 square meters)  Note: GFR calculation is accurate only with a steady state creatinine    CBC and differential [[id number]]  (Abnormal) Collected: 08/29/24 0612    Lab Status: Final result Specimen: Blood from Arm, Left Updated: 08/29/24 0619     WBC 7.64 Thousand/uL      RBC 4.46 Million/uL      Hemoglobin 14.5 g/dL      Hematocrit 42.5 %      MCV 95 fL      MCH 32.5 pg      MCHC 34.1 g/dL      RDW 12.8 %      MPV 9.1 fL      Platelets 366 Thousands/uL      nRBC 0 /100 WBCs      Segmented % 55 %      Immature Grans % 0 %      Lymphocytes % 24 %      Monocytes % 15 %      Eosinophils Relative 4 %      Basophils Relative 2 %      Absolute Neutrophils 4.16 Thousands/µL      Absolute Immature Grans 0.03 Thousand/uL      Absolute Lymphocytes 1.85 Thousands/µL      Absolute Monocytes 1.14 Thousand/µL      Eosinophils Absolute 0.31 Thousand/µL      Basophils Absolute 0.15 Thousands/µL                    XR chest 2 views   Final Result by Katlin Henriquez MD (08/29 1026)      No acute cardiopulmonary disease.            Resident: Deborah Esquivel      I, the attending radiologist, have reviewed the images and agree with the final report above.      Workstation performed: [id number]                     Procedures  ECG 12 Lead Documentation Only    Date/Time: 8/29/2024 6:07 AM    Performed by: Minesh Beebe PA-C  Authorized by: Minesh Beebe PA-C    Indications / Diagnosis:  SOB  ECG reviewed by me, the ED Provider: yes    Patient location:  ED  Previous ECG:     Previous ECG:  Compared to current    Comparison ECG info:  9/21/23    Similarity:  No change    Comparison to cardiac monitor: Yes    Interpretation:     Interpretation: normal    Rate:     ECG rate:  94    ECG rate assessment: normal    Rhythm:     Rhythm: sinus rhythm    Ectopy:     Ectopy: none    QRS:     QRS axis:  Normal    QRS intervals:  Normal  Conduction:     Conduction: normal    ST segments:     ST segments:  Normal  T waves:     T waves: normal             ED Course  ED Course as of 08/29/24 1042   Thu Aug 29, 2024   0708 CBC and differential(!)  Reviewed. Normal, no anemia or elevated WBC.    0708 FLU/RSV/COVID - if FLU/RSV clinically relevant  Viral screen for covid/flu/rsv negative.    0708 Comprehensive metabolic panel  Normal, no renal failure.    0708 HS Troponin 0hr (reflex protocol)  Troponin normal at 4.    0716 Re-evaluation: initial BP hypertensive at 173/94, repeat BP improved at 141/71 without intervention    0723 Initial troponin 4 is negative.  HEART score 3.  Will obtain delta troponin, if trend is flat, patient reasonable for discharge and outpatient follow up.     0737 Re-evaluation.  Patient remains asymptomatic on repeat evaluation.  Additional history obtained.  Patient previously on 5 mg prednisone daily for COPD and PND maintinence, but stopped it 2-3 weeks ago when RX ran out.  Was out golfing 36 holes yesterday without any symptoms.   Cardiac workup negative.  Suspect being off prednisone, patient has post nasal drip causing bronchospasm.  Will check SP02 lying flat and with ambulation and reassess after 2nd troponin.    0827 Re-evaluation:  ambulatory SP02 normal 94-96% room air, HR 80-97  without SOB, cp or dizziness.   Lying flat SPO2 normal at 94-95%, and patient reports no SOB.  Will obtain 2 hour troponin, if trend is flat, patient stable for discharge.  SOB likely secondary to bronchospasm exacerbated by post nasal drip and recent discontinuation of prednisone.  Low suspicion for cardiac etiology of SOB.   0921 HS Troponin I 2hr  2 hour troponin is less than 2.  Delta is <2. ED workup negative for ACS.     0926 Remains asymptomatic on repeat evaluation.  Stable for discharge home with steroid burst and taper back down to 5 mg maintenance dose.  RX in pocket for azithromycin to cover COPD exacerbation if symptoms persist.  Patient requested medication for difficulty sleeping at night.  Will give rx for ativan which has worked well for patient in the past.                 HEART Risk Score      Flowsheet Row Most Recent Value   Heart Score Risk Calculator    History 0 Filed at: 08/29/2024 0719   ECG 0 Filed at: 08/29/2024 0719   Age 1 Filed at: 08/29/2024 0719   Risk Factors 2 Filed at: 08/29/2024 0719   Troponin 0 Filed at: 08/29/2024 0719   HEART Score 3 Filed at: 08/29/2024 0719                          SBIRT 20yo+      Flowsheet Row Most Recent Value   Initial Alcohol Screen: US AUDIT-C     1. How often do you have a drink containing alcohol? 0 Filed at: 08/29/2024 0610   2. How many drinks containing alcohol do you have on a typical day you are drinking?  0 Filed at: 08/29/2024 0610   3a. Male UNDER 65: How often do you have five or more drinks on one occasion? 0 Filed at: 08/29/2024 0610   Audit-C Score 0 Filed at: 08/29/2024 0610   [place]: How many times in the past year have you...    Used an illegal drug or used a prescription medication for non-medical reasons? Never Filed at: 08/29/2024 0610                      Medical Decision Making  MDM:     DDX: Based on my history physical exam and review of patient co-morbidities Differential diagnosis includes but is not limited to COPD  exacerbation, asthma, pneumonia, pleural effusion, pericardial effusion, congestive heart failure, pleurisy, bronchospasm, pericarditis, bronchitis, influenza, plan SOB working including ekg, cxr, and labs.     Patient has been medically evaluated for potential limb- or life-threatening conditions that may lead to permanent organ injury or dysfunction. I reviewed all vital signs, nursing notes, and other relevant history, physical exam and ancillary testing.    EKG ordered and interpreted by me.   My initial interpretation:  normal sinus rhythm, normal rate at 94, normal axis and intervals.  No STEMI.     CXR ordered and interpreted by me.  My initial interpretation: no infiltrate or PTX.     Final Assessment (see ED course for additional MDM): Shortness of breath.  Acute exacerbation COPD with bronchospasm likely exacerbated by recent discontinuation of prednisone.  Improved after ED treatment.   ACS workup negative.  HEART score 3.  Patient improved/asymptomatic on repeat evaluation.  Vital signs normal, no hypoxia.  Stable for discharge and outpatient treatment with PCP, established cardiologist and pulmonologist.     Return to ED precautions given.                 Amount and/or Complexity of Data Reviewed  Labs: ordered. Decision-making details documented in ED Course.  Radiology: ordered.    Risk  Prescription drug management.                 Disposition  Final diagnoses:   SOB (shortness of breath)   Acute bronchospasm   Anxiety     Time reflects when diagnosis was documented in both MDM as applicable and the Disposition within this note       Time User Action Codes Description Comment    8/29/2024  9:22 AM Minesh Beebe [R06.02] SOB (shortness of breath)     8/29/2024  9:22 AM Minesh Beebe Add [J98.01] Acute bronchospasm     8/29/2024  9:28 AM Minesh Beebe [F41.9] Anxiety           ED Disposition       ED Disposition   Discharge    Condition   Stable    Date/Time   Thu Aug 29, 2024 0922     Comment   Moy Dickerson discharge to home/self care.                   Follow-up Information       Follow up With Specialties Details Why Contact Info Additional Information    [facility] Emergency Department Emergency Medicine Go to  If symptoms worsen [address] [facility] [id number] [phone] [facility] Emergency Department, [address] [place], [place], [address]    La Owens MD Cardiology Call in 1 week for further evaluation of symptoms [address] [facility] [place] [address]  [phone]       John De La Rosa MD Pulmonary Disease Call in 3 days for further evaluation of symptoms [address] [facility] [address]  [phone]               Discharge Medication List as of 8/29/2024  9:38 AM        START taking these medications    Details   azithromycin (ZITHROMAX) 250 mg tablet Take 2 tablets on day 1, then 1 tablet PO daily x 4 days, Normal      LORazepam (Ativan) 0.5 mg tablet Take 1 tablet (0.5 mg total) by mouth daily at bedtime as needed for anxiety, Starting Thu 8/29/2024, Until Sat 9/28/2024 at 2359, Normal      predniSONE 5 mg tablet 20 mg PO daily x 1 day, then 15 mg PO daily x 1 day, then 10mg PO daily x 1 days then 5 mg PO daily, Normal           CONTINUE these medications which have NOT CHANGED    Details   albuterol (2.5 mg/3 mL) 0.083 % nebulizer solution Take 3 mL (2.5 mg total) by nebulization every 6 (six) hours as needed for wheezing or shortness of breath, Starting Thu 9/21/2023, Normal      albuterol (PROAIR HFA) 90 mcg/act inhaler Inhale 1 puff every 4 (four) hours as needed for wheezing or shortness of breath, Starting Tue 2/6/2018, Normal      aspirin 81 MG tablet Take 1 tablet by mouth daily, Starting Thu 2/19/2015, Historical Med      benzonatate (TESSALON PERLES) 100 mg capsule Take 1 capsule (100 mg total) by mouth every 8 (eight) hours, Starting Tue 12/13/2022, Normal      carbamide  peroxide (DEBROX) 6.5 % otic solution Administer 5 drops into ears 2 (two) times a day, Starting Tue 12/13/2022, Normal      carvedilol (COREG) 3.125 mg tablet TAKE ONE TABLET BY MOUTH TWICE A DAY, Normal      Eliquis 5 MG TAKE ONE TABLET BY MOUTH TWICE A DAY, Normal      fluticasone-salmeterol (Advair) 250-50 mcg/dose inhaler Inhale, Historical Med      losartan (COZAAR) 25 mg tablet Take 1 tablet (25 mg total) by mouth daily, Starting Th 9/21/2023, Normal      montelukast (SINGULAIR) 10 mg tablet Take by mouth, Historical Med      umeclidinium-vilanterol (Anoro Ellipta) 62.5-25 MCG/INH inhaler Anoro Ellipta 62.5 mcg-25 mcg/actuation powder for inhalation   INHALE ONE PUFF BY MOUTH EVERY DAY, Historical Med             No discharge procedures on file.    PDMP Review       None            ED Provider  Electronically Signed by             Minesh Beebe PA-C  08/29/24 9107

## 2024-08-29 NOTE — ED NOTES
Patient ambulated under staff supervision, gait smooth and stable, oxygen maintained at 94-95%, denied shortness of breath or dizziness, heart rate maintained between 80-97bpm. Patient laid flat on bed per ED physician instruction, patient's oxygen maintained at 94%, HR in 80's, denies shortness of breath as well during trial. ED physician notified.      Lew Santiago RN  08/29/24 0743

## 2024-09-19 DIAGNOSIS — I10 ESSENTIAL HYPERTENSION: ICD-10-CM

## 2024-09-19 DIAGNOSIS — I10 HYPERTENSION, ESSENTIAL: ICD-10-CM

## 2024-09-19 RX ORDER — CARVEDILOL 3.12 MG/1
TABLET ORAL
Qty: 180 TABLET | Refills: 3 | Status: SHIPPED | OUTPATIENT
Start: 2024-09-19

## 2024-09-19 RX ORDER — LOSARTAN POTASSIUM 25 MG/1
25 TABLET ORAL DAILY
Qty: 90 TABLET | Refills: 3 | Status: SHIPPED | OUTPATIENT
Start: 2024-09-19

## 2024-09-19 NOTE — TELEPHONE ENCOUNTER
Medication: carvedilol (COREG) 3.125 mg tablet     Dose/Frequency: TAKE ONE TABLET BY MOUTH TWICE A DAY     Quantity: 180 tablet     Pharmacy: [facility] [id number] [place] [address] [address]     Office:   [] PCP/Provider -   [x] Speciality/Provider - La Owens MD     Does the patient have enough for 3 days?   [x] Yes   [] No - Send as HP to POD    Medication: losartan (COZAAR) 25 mg tablet     Dose/Frequency: Take 1 tablet (25 mg total) by mouth daily     Quantity: 90 tablet     Pharmacy: [facility] [id number] [place] [id number]     Office:   [] PCP/Provider -   [x] Speciality/Provider - La Owens MD     Does the patient have enough for 3 days?   [x] Yes   [] No - Send as HP to POD      *patient scheduled his follow up appt for December

## 2024-12-13 DIAGNOSIS — I10 ESSENTIAL HYPERTENSION: ICD-10-CM

## 2024-12-13 DIAGNOSIS — R06.00 DYSPNEA: ICD-10-CM

## 2024-12-13 DIAGNOSIS — I10 HYPERTENSION, ESSENTIAL: ICD-10-CM

## 2024-12-13 RX ORDER — LOSARTAN POTASSIUM 25 MG/1
25 TABLET ORAL DAILY
Qty: 90 TABLET | Refills: 3 | OUTPATIENT
Start: 2024-12-13

## 2024-12-13 RX ORDER — CARVEDILOL 3.12 MG/1
TABLET ORAL
Qty: 180 TABLET | Refills: 3 | OUTPATIENT
Start: 2024-12-13

## 2025-01-30 ENCOUNTER — OFFICE VISIT (OUTPATIENT)
Dept: CARDIOLOGY CLINIC | Facility: CLINIC | Age: 65
End: 2025-01-30
Payer: COMMERCIAL

## 2025-01-30 VITALS
RESPIRATION RATE: 16 BRPM | WEIGHT: 174 LBS | SYSTOLIC BLOOD PRESSURE: 140 MMHG | HEART RATE: 89 BPM | HEIGHT: 70 IN | OXYGEN SATURATION: 98 % | DIASTOLIC BLOOD PRESSURE: 80 MMHG | BODY MASS INDEX: 24.91 KG/M2

## 2025-01-30 DIAGNOSIS — Z79.01 CHRONIC ANTICOAGULATION: Primary | ICD-10-CM

## 2025-01-30 DIAGNOSIS — I10 HYPERTENSION, ESSENTIAL: ICD-10-CM

## 2025-01-30 PROCEDURE — 99213 OFFICE O/P EST LOW 20 MIN: CPT | Performed by: INTERNAL MEDICINE

## 2025-01-30 NOTE — ASSESSMENT & PLAN NOTE
Patient is on long-term anticoagulation because of history of recurrent DVT and pulmonary embolism.  Patient understands the risks and benefits of anticoagulation to prevent venous thromboembolism.  Patient to report any bleeding issues.  Patient to avoid NSAIDs.

## 2025-01-30 NOTE — PROGRESS NOTES
[facility]  [address] [place] [address]  Cardiology Follow Up    Moy Dickerson  1960  [id number]      Assessment & Plan  Chronic anticoagulation  Patient is on long-term anticoagulation because of history of recurrent DVT and pulmonary embolism.  Patient understands the risks and benefits of anticoagulation to prevent venous thromboembolism.  Patient to report any bleeding issues.  Patient to avoid NSAIDs.  Hypertension, essential  Importance of salt restriction and compliance with medication discussed with patient.    Patient also counseled to quit smoking to prevent future cardiovascular events.  Symptoms to watch out from cardiac standpoint which would indicate the need for further cardiac evaluation also discussed.  Follow-up with primary care physician.    Follow-up in 1 year or earlier as needed.  Patient is agreeable with the plan of care.       Chief Complaint   Patient presents with    Follow-up       Interval History:   Patient presents for follow-up visit.  Patient denies any history of chest pain shortness of breath.  Patient denies any history of leg edema or orthopnea PND.  No history of presyncope syncope.  Patient states compliance with the present list of medications.  Patient denies any bleeding issues.  Patient continues to smoke but has cut down and is trying to quit.    Patient Active Problem List   Diagnosis    Hypertension, essential    Asthma    Tobacco abuse    Dyspnea    Pulmonary emboli (HCC)    Abnormal CT scan    Leukocytosis    Chronic anticoagulation     Past Medical History:   Diagnosis Date    Asthma     Cardiomyopathy (HCC)     COPD (chronic obstructive pulmonary disease) (HCC)     Hypertension      Social History     Socioeconomic History    Marital status: /Civil Union     Spouse name: Not on file    Number of children: Not on file    Years of education: Not on file    Highest education level: Not on file   Occupational History    Not  on file   Tobacco Use    Smoking status: Every Day     Current packs/day: 0.50     Types: Cigarettes    Smokeless tobacco: Never   Vaping Use    Vaping status: Never Used   Substance and Sexual Activity    Alcohol use: No    Drug use: No    Sexual activity: Not on file   Other Topics Concern    Not on file   Social History Narrative    Not on file     Social Drivers of Health     Financial Resource Strain: Not on file   Food Insecurity: Not on file   Transportation Needs: Not on file   Physical Activity: Not on file   Stress: Not on file   Social Connections: Not on file   Intimate Partner Violence: Not on file   Housing Stability: Not on file      Family History   Problem Relation Age of Onset    Deep vein thrombosis Father      No past surgical history on file.    Current Outpatient Medications:     albuterol (2.5 mg/3 mL) 0.083 % nebulizer solution, Take 3 mL (2.5 mg total) by nebulization every 6 (six) hours as needed for wheezing or shortness of breath, Disp: 75 mL, Rfl: 0    albuterol (PROAIR HFA) 90 mcg/act inhaler, Inhale 1 puff every 4 (four) hours as needed for wheezing or shortness of breath, Disp: 1 Inhaler, Rfl: 0    apixaban (Eliquis) 5 mg, Take 1 tablet (5 mg total) by mouth 2 (two) times a day, Disp: 180 tablet, Rfl: 0    aspirin 81 MG tablet, Take 1 tablet by mouth daily, Disp: , Rfl:     carvedilol (COREG) 3.125 mg tablet, TAKE ONE TABLET BY MOUTH TWICE A DAY, Disp: 180 tablet, Rfl: 3    fluticasone-salmeterol (Advair) 250-50 mcg/dose inhaler, Inhale, Disp: , Rfl:     LORazepam (Ativan) 0.5 mg tablet, Take 1 tablet (0.5 mg total) by mouth daily at bedtime as needed for anxiety, Disp: 30 tablet, Rfl: 0    losartan (COZAAR) 25 mg tablet, Take 1 tablet (25 mg total) by mouth daily, Disp: 90 tablet, Rfl: 3    montelukast (SINGULAIR) 10 mg tablet, Take by mouth, Disp: , Rfl:     predniSONE 5 mg tablet, 20 mg PO daily x 1 day, then 15 mg PO daily x 1 day, then 10mg PO daily x 1 days then 5 mg PO daily,  Disp: 60 tablet, Rfl: 0    umeclidinium-vilanterol (Anoro Ellipta) 62.5-25 MCG/INH inhaler, Anoro Ellipta 62.5 mcg-25 mcg/actuation powder for inhalation  INHALE ONE PUFF BY MOUTH EVERY DAY, Disp: , Rfl:   Allergies   Allergen Reactions    Naproxen GI Intolerance    Pollen Extract Sneezing     Other reaction(s): Unknown       Labs:  No visits with results within 2 Month(s) from this visit.   Latest known visit with results is:   Admission on 08/29/2024, Discharged on 08/29/2024   Component Date Value    Ventricular Rate 08/29/2024 94     Atrial Rate 08/29/2024 94     SD Interval 08/29/2024 138     QRSD Interval 08/29/2024 90     QT Interval 08/29/2024 358     QTC Interval 08/29/2024 447     P Axis 08/29/2024 78     QRS Axis 08/29/2024 71     T Wave [place] 08/29/2024 64     WBC 08/29/2024 7.64     RBC 08/29/2024 4.46     Hemoglobin 08/29/2024 14.5     Hematocrit 08/29/2024 42.5     MCV 08/29/2024 95     MCH 08/29/2024 32.5     MCHC 08/29/2024 34.1     RDW 08/29/2024 12.8     MPV 08/29/2024 9.1     Platelets 08/29/2024 366     nRBC 08/29/2024 0     Segmented % 08/29/2024 55     Immature Grans % 08/29/2024 0     Lymphocytes % 08/29/2024 24     Monocytes % 08/29/2024 15 (H)     Eosinophils Relative 08/29/2024 4     Basophils Relative 08/29/2024 2 (H)     Absolute Neutrophils 08/29/2024 4.16     Absolute Immature Grans 08/29/2024 0.03     Absolute Lymphocytes 08/29/2024 1.85     Absolute Monocytes 08/29/2024 1.14     Eosinophils Absolute 08/29/2024 0.31     Basophils Absolute 08/29/2024 0.15 (H)     Sodium 08/29/2024 140     Potassium 08/29/2024 5.2     Chloride 08/29/2024 106     CO2 08/29/2024 29     ANION GAP 08/29/2024 5     BUN 08/29/2024 16     Creatinine 08/29/2024 1.00     Glucose 08/29/2024 106     Calcium 08/29/2024 9.2     AST 08/29/2024 17     ALT 08/29/2024 13     Alkaline Phosphatase 08/29/2024 56     Total Protein 08/29/2024 7.2     Albumin 08/29/2024 4.3     Total Bilirubin 08/29/2024 0.37     eGFR  "08/29/2024 80     hs TnI 0hr 08/29/2024 4     SARS-CoV-2 08/29/2024 Negative     INFLUENZA A PCR 08/29/2024 Negative     INFLUENZA B PCR 08/29/2024 Negative     RSV PCR 08/29/2024 Negative     hs TnI 2hr 08/29/2024 <2     Delta 2hr hsTnI 08/29/2024 <-2      Imaging: No results found.    Review of Systems:  Review of Systems  REVIEW OF SYSTEMS:  Constitutional:  Denies fever or chills   Eyes:  Denies change in visual acuity   HENT:  Denies nasal congestion or sore throat   Respiratory:  Denies cough or shortness of breath   Cardiovascular:  Denies chest pain or edema   GI:  Denies abdominal pain, nausea, vomiting, bloody stools or diarrhea   :  Denies dysuria, frequency, difficulty in micturition and nocturia  Musculoskeletal:  Denies back pain or joint pain   Neurologic:  Denies headache, focal weakness or sensory changes   Endocrine:  Denies polyuria or polydipsia   Lymphatic:  Denies swollen glands   Psychiatric:  Denies depression or anxiety    Physical Exam:    /80 (BP Location: Right arm, Patient Position: Sitting, Cuff Size: Standard)   Pulse 89   Resp 16   Ht 5' 10\" (1.778 m)   Wt 78.9 kg (174 lb)   SpO2 98%   BMI 24.97 kg/m²     Physical Exam  PHYSICAL EXAM:  General:  Patient is not in acute distress   Head: Normocephalic, Atraumatic.  HEENT:  Both pupils normal-size atraumatic, normocephalic, nonicteric  Neck:  JVP not raised. Trachea central. No carotid bruit  Respiratory:  normal breath sounds no crackles. no rhonchi  Cardiovascular:  Regular rate and rhythm no S3 no murmurs  GI:  Abdomen soft nontender. No organomegaly.   Lymphatic:  No cervical or inguinal lymphadenopathy  Neurologic:  Patient is awake alert, oriented . Grossly nonfocal  Extremities no edema      "

## 2025-01-30 NOTE — ASSESSMENT & PLAN NOTE
Importance of salt restriction and compliance with medication discussed with patient.    Patient also counseled to quit smoking to prevent future cardiovascular events.  Symptoms to watch out from cardiac standpoint which would indicate the need for further cardiac evaluation also discussed.  Follow-up with primary care physician.    Follow-up in 1 year or earlier as needed.  Patient is agreeable with the plan of care.

## 2025-02-02 ENCOUNTER — HOSPITAL ENCOUNTER (EMERGENCY)
Facility: HOSPITAL | Age: 65
Discharge: HOME | End: 2025-02-02
Payer: COMMERCIAL

## 2025-02-02 ENCOUNTER — APPOINTMENT (EMERGENCY)
Dept: RADIOLOGY | Facility: HOSPITAL | Age: 65
End: 2025-02-02
Payer: COMMERCIAL

## 2025-02-02 VITALS
OXYGEN SATURATION: 96 % | SYSTOLIC BLOOD PRESSURE: 143 MMHG | TEMPERATURE: 98.8 F | BODY MASS INDEX: 25.28 KG/M2 | HEIGHT: 70 IN | RESPIRATION RATE: 21 BRPM | DIASTOLIC BLOOD PRESSURE: 76 MMHG | WEIGHT: 176.59 LBS | HEART RATE: 102 BPM

## 2025-02-02 DIAGNOSIS — J10.1 TYPE A INFLUENZA: Primary | ICD-10-CM

## 2025-02-02 DIAGNOSIS — R07.9 CHEST PAIN, UNSPECIFIED: ICD-10-CM

## 2025-02-02 LAB
ALBUMIN SERPL BCG-MCNC: 4.6 G/DL (ref 3.5–5)
ALP SERPL-CCNC: 66 U/L (ref 34–104)
ALT SERPL W P-5'-P-CCNC: 14 U/L (ref 7–52)
ANION GAP SERPL CALCULATED.3IONS-SCNC: 7 MMOL/L (ref 4–13)
APTT PPP: 32 SECONDS (ref 23–34)
AST SERPL W P-5'-P-CCNC: 12 U/L (ref 13–39)
ATRIAL RATE: 110 BPM
BASOPHILS # BLD AUTO: 0.07 THOUSANDS/ΜL (ref 0–0.1)
BASOPHILS NFR BLD AUTO: 1 % (ref 0–1)
BILIRUB SERPL-MCNC: 0.4 MG/DL (ref 0.2–1)
BNP SERPL-MCNC: 22 PG/ML (ref 0–100)
BUN SERPL-MCNC: 14 MG/DL (ref 5–25)
CALCIUM SERPL-MCNC: 8.9 MG/DL (ref 8.4–10.2)
CARDIAC TROPONIN I PNL SERPL HS: <2 NG/L (ref ?–50)
CARDIAC TROPONIN I PNL SERPL HS: <2 NG/L (ref ?–50)
CHLORIDE SERPL-SCNC: 102 MMOL/L (ref 96–108)
CO2 SERPL-SCNC: 25 MMOL/L (ref 21–32)
CREAT SERPL-MCNC: 0.95 MG/DL (ref 0.6–1.3)
D DIMER PPP FEU-MCNC: 0.38 UG/ML FEU
EOSINOPHIL # BLD AUTO: 0.1 THOUSAND/ΜL (ref 0–0.61)
EOSINOPHIL NFR BLD AUTO: 1 % (ref 0–6)
ERYTHROCYTE [DISTWIDTH] IN BLOOD BY AUTOMATED COUNT: 13.5 % (ref 11.6–15.1)
FLUAV RNA RESP QL NAA+PROBE: POSITIVE
FLUBV RNA RESP QL NAA+PROBE: NEGATIVE
GFR SERPL CREATININE-BSD FRML MDRD: 84 ML/MIN/1.73SQ M
GLUCOSE SERPL-MCNC: 106 MG/DL (ref 65–140)
HCT VFR BLD AUTO: 47.2 % (ref 36.5–49.3)
HGB BLD-MCNC: 15.6 G/DL (ref 12–17)
IMM GRANULOCYTES # BLD AUTO: 0.05 THOUSAND/UL (ref 0–0.2)
IMM GRANULOCYTES NFR BLD AUTO: 1 % (ref 0–2)
INR PPP: 1.02 (ref 0.85–1.19)
LACTATE SERPL-SCNC: 1.3 MMOL/L (ref 0.5–2)
LYMPHOCYTES # BLD AUTO: 0.83 THOUSANDS/ΜL (ref 0.6–4.47)
LYMPHOCYTES NFR BLD AUTO: 9 % (ref 14–44)
MCH RBC QN AUTO: 31.8 PG (ref 26.8–34.3)
MCHC RBC AUTO-ENTMCNC: 33.1 G/DL (ref 31.4–37.4)
MCV RBC AUTO: 96 FL (ref 82–98)
MONOCYTES # BLD AUTO: 0.83 THOUSAND/ΜL (ref 0.17–1.22)
MONOCYTES NFR BLD AUTO: 9 % (ref 4–12)
NEUTROPHILS # BLD AUTO: 6.98 THOUSANDS/ΜL (ref 1.85–7.62)
NEUTS SEG NFR BLD AUTO: 79 % (ref 43–75)
NRBC BLD AUTO-RTO: 0 /100 WBCS
P AXIS: 70 DEGREES
PLATELET # BLD AUTO: 271 THOUSANDS/UL (ref 149–390)
PMV BLD AUTO: 9.4 FL (ref 8.9–12.7)
POTASSIUM SERPL-SCNC: 4.7 MMOL/L (ref 3.5–5.3)
PR INTERVAL: 132 MS
PROCALCITONIN SERPL-MCNC: 0.07 NG/ML
PROT SERPL-MCNC: 7.7 G/DL (ref 6.4–8.4)
PROTHROMBIN TIME: 14.1 SECONDS (ref 12.3–15)
QRS AXIS: 91 DEGREES
QRSD INTERVAL: 90 MS
QT INTERVAL: 326 MS
QTC INTERVAL: 441 MS
RBC # BLD AUTO: 4.9 MILLION/UL (ref 3.88–5.62)
RSV RNA RESP QL NAA+PROBE: NEGATIVE
SARS-COV-2 RNA RESP QL NAA+PROBE: NEGATIVE
SODIUM SERPL-SCNC: 134 MMOL/L (ref 135–147)
T WAVE AXIS: 34 DEGREES
VENTRICULAR RATE: 110 BPM
WBC # BLD AUTO: 8.86 THOUSAND/UL (ref 4.31–10.16)

## 2025-02-02 PROCEDURE — 0241U HB NFCT DS VIR RESP RNA 4 TRGT: CPT

## 2025-02-02 PROCEDURE — 94640 AIRWAY INHALATION TREATMENT: CPT

## 2025-02-02 PROCEDURE — 83880 ASSAY OF NATRIURETIC PEPTIDE: CPT

## 2025-02-02 PROCEDURE — 93005 ELECTROCARDIOGRAM TRACING: CPT

## 2025-02-02 PROCEDURE — 80053 COMPREHEN METABOLIC PANEL: CPT

## 2025-02-02 PROCEDURE — 85379 FIBRIN DEGRADATION QUANT: CPT

## 2025-02-02 PROCEDURE — 93010 ELECTROCARDIOGRAM REPORT: CPT | Performed by: INTERNAL MEDICINE

## 2025-02-02 PROCEDURE — 96365 THER/PROPH/DIAG IV INF INIT: CPT

## 2025-02-02 PROCEDURE — 87040 BLOOD CULTURE FOR BACTERIA: CPT

## 2025-02-02 PROCEDURE — 99285 EMERGENCY DEPT VISIT HI MDM: CPT

## 2025-02-02 PROCEDURE — 96375 TX/PRO/DX INJ NEW DRUG ADDON: CPT

## 2025-02-02 PROCEDURE — 85025 COMPLETE CBC W/AUTO DIFF WBC: CPT

## 2025-02-02 PROCEDURE — 84145 PROCALCITONIN (PCT): CPT

## 2025-02-02 PROCEDURE — 36415 COLL VENOUS BLD VENIPUNCTURE: CPT

## 2025-02-02 PROCEDURE — 85610 PROTHROMBIN TIME: CPT

## 2025-02-02 PROCEDURE — 85730 THROMBOPLASTIN TIME PARTIAL: CPT

## 2025-02-02 PROCEDURE — 71046 X-RAY EXAM CHEST 2 VIEWS: CPT

## 2025-02-02 PROCEDURE — 83605 ASSAY OF LACTIC ACID: CPT

## 2025-02-02 PROCEDURE — 84484 ASSAY OF TROPONIN QUANT: CPT

## 2025-02-02 RX ORDER — OSELTAMIVIR PHOSPHATE 75 MG/1
75 CAPSULE ORAL ONCE
Status: DISCONTINUED | OUTPATIENT
Start: 2025-02-02 | End: 2025-02-02 | Stop reason: HOSPADM

## 2025-02-02 RX ORDER — IPRATROPIUM BROMIDE AND ALBUTEROL SULFATE 2.5; .5 MG/3ML; MG/3ML
3 SOLUTION RESPIRATORY (INHALATION) ONCE
Status: COMPLETED | OUTPATIENT
Start: 2025-02-02 | End: 2025-02-02

## 2025-02-02 RX ORDER — METHYLPREDNISOLONE SODIUM SUCCINATE 125 MG/2ML
125 INJECTION, POWDER, LYOPHILIZED, FOR SOLUTION INTRAMUSCULAR; INTRAVENOUS ONCE
Status: COMPLETED | OUTPATIENT
Start: 2025-02-02 | End: 2025-02-02

## 2025-02-02 RX ORDER — PREDNISONE 50 MG/1
50 TABLET ORAL DAILY
Qty: 4 TABLET | Refills: 0 | Status: SHIPPED | OUTPATIENT
Start: 2025-02-02 | End: 2025-02-06

## 2025-02-02 RX ADMIN — CEFTRIAXONE SODIUM 1000 MG: 10 INJECTION, POWDER, FOR SOLUTION INTRAVENOUS at 14:08

## 2025-02-02 RX ADMIN — IPRATROPIUM BROMIDE AND ALBUTEROL SULFATE 3 ML: 2.5; .5 SOLUTION RESPIRATORY (INHALATION) at 14:00

## 2025-02-02 RX ADMIN — SODIUM CHLORIDE 1000 ML: 0.9 INJECTION, SOLUTION INTRAVENOUS at 13:54

## 2025-02-02 RX ADMIN — METHYLPREDNISOLONE SODIUM SUCCINATE 125 MG: 125 INJECTION, POWDER, FOR SOLUTION INTRAMUSCULAR; INTRAVENOUS at 13:55

## 2025-02-02 NOTE — ED PROVIDER NOTES
Time reflects when diagnosis was documented in both MDM as applicable and the Disposition within this note       Time User Action Codes Description Comment    2/2/2025  5:11 PM Tania Martinez Add [J10.1] Type A influenza     2/2/2025  5:11 PM Tania Martinez Add [R07.9] Chest pain, unspecified           ED Disposition       ED Disposition   Discharge    Condition   Stable    Date/Time   Sun Feb 2, 2025  5:11 PM    Comment   Moy Dickerson discharge to home/self care.                   Assessment & Plan       Medical Decision Making  63-year-old male presenting to emergency department for evaluation of shortness of breath    Differential includes pneumonia, pneumothorax, pulmonary effusion, CHF, ACS, PE, viral syndrome    Doubt dissection    Plan: bloodwork, imaging    Evaluation in the emergency department is unrevealing.  Dimer negative.  Chest x-ray without acute findings, no pneumothorax, pleural effusion, pneumonia.  ECG without evidence for STEMI.  Troponin negative x 2.  Patient is positive for influenza.  He reports chest heaviness that is positional and resolves with leaning forward.  He does not describe a sharp pain and is otherwise well-appearing, doubt myocarditis, pericarditis.  He was already on Augmentin, will add steroids for potential COPD exacerbation.  He is instructed to continue the Augmentin.  Patient is amenable to discharge at this time.  Discussed Tamiflu, patient declines.  Strict return precautions given to patient verbalized understanding.  Patient discharged in good condition.    Amount and/or Complexity of Data Reviewed  Labs: ordered. Decision-making details documented in ED Course.  Radiology: ordered. Decision-making details documented in ED Course.    Risk  Prescription drug management.        ED Course as of 02/05/25 1311   Sun Feb 02, 2025   1328 ECG interpretation by me.  Sinus tachycardia at 110.  Normal intervals.  Questionable left axis deviation.  TWI in III. Otherwise  no acute ST elevation.  When compared to prior dated August 2024 nonspecific T wave change in lead III appears new.  Otherwise no further ischemic changes noted.   1358 XR chest 2 views  WNL   1434 Comprehensive metabolic panel(!)   1434 CBC and differential(!)   1434 LACTIC ACID: 1.3   1458 INFLU A PCR(!): Positive   1630 Pt has changed mind about tamiflu, now declining   1710 Patient reports any chest heaviness he has resolved with sitting upright.  States this is positional in nature.  Is amenable to discharge without Tamiflu.  He is on Augmentin for sinusitis recommend continuing.       Medications   sodium chloride 0.9 % bolus 1,000 mL (0 mL Intravenous Stopped 2/2/25 1454)   ipratropium-albuterol (DUO-NEB) 0.5-2.5 mg/3 mL inhalation solution 3 mL (3 mL Nebulization Given 2/2/25 1400)   ceftriaxone (ROCEPHIN) 1 g/50 mL in dextrose IVPB (0 mg Intravenous Stopped 2/2/25 1438)   methylPREDNISolone sodium succinate (Solu-MEDROL) injection 125 mg (125 mg Intravenous Given 2/2/25 1355)       ED Risk Strat Scores   HEART Risk Score      Flowsheet Row Most Recent Value   Heart Score Risk Calculator    History 1 Filed at: 02/02/2025 1711   ECG 1 Filed at: 02/02/2025 1711   Age 1 Filed at: 02/02/2025 1711   Risk Factors 1 Filed at: 02/02/2025 1711   Troponin 0 Filed at: 02/02/2025 1711   HEART Score 4 Filed at: 02/02/2025 1711          HEART Risk Score      Flowsheet Row Most Recent Value   Heart Score Risk Calculator    History 1 Filed at: 02/02/2025 1711   ECG 1 Filed at: 02/02/2025 1711   Age 1 Filed at: 02/02/2025 1711   Risk Factors 1 Filed at: 02/02/2025 1711   Troponin 0 Filed at: 02/02/2025 1711   HEART Score 4 Filed at: 02/02/2025 1711                            SBIRT 20yo+      Flowsheet Row Most Recent Value   Initial Alcohol Screen: US AUDIT-C     1. How often do you have a drink containing alcohol? 0 Filed at: 02/02/2025 1228   2. How many drinks containing alcohol do you have on a typical day you are  drinking?  0 Filed at: 02/02/2025 1228   3a. Male UNDER 65: How often do you have five or more drinks on one occasion? 0 Filed at: 02/02/2025 1228   Audit-C Score 0 Filed at: 02/02/2025 1228   [place]: How many times in the past year have you...    Used an illegal drug or used a prescription medication for non-medical reasons? Never Filed at: 02/02/2025 1228                            History of Present Illness       Chief Complaint   Patient presents with    Shortness of Breath     Patient arrived to ER c/o SOB hat started a few days ago. +sinus pressure SOB with activity        Past Medical History:   Diagnosis Date    Asthma     Cardiomyopathy (HCC)     COPD (chronic obstructive pulmonary disease) (HCC)     Hypertension       History reviewed. No pertinent surgical history.   Family History   Problem Relation Age of Onset    Deep vein thrombosis Father       Social History     Tobacco Use    Smoking status: Every Day     Current packs/day: 0.50     Types: Cigarettes    Smokeless tobacco: Never   Vaping Use    Vaping status: Never Used   Substance Use Topics    Alcohol use: No    Drug use: No      E-Cigarette/Vaping    E-Cigarette Use Never User       E-Cigarette/Vaping Substances      I have reviewed and agree with the history as documented.     Patient is a 64-year-old male with a past medical history significant for asthma, cardiomyopathy, COPD, hypertension presenting to emergency department for evaluation of cough, congestion.  He notes 3 days of mild congestion.  He was seen by cardiology where he reported he had no symptoms however noted that that morning he was having some nasal congestion.  He was seen at urgent care who placed him on Augmentin.  He has been on Augmentin for 2 full days now.  He notes ongoing coughing that is productive.  He is unsure what color sputum.  Notes some mild chills that started last night.  Denies numbness, weakness, tingling of his extremities.  Notes some chest pain that he  describes as a heaviness around 2 AM that resolved.  Notes some mild dyspnea and chest heaviness currently.  Denies lower extremity swelling or recent weight gain.  Unsure if he has a fever.  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        Review of Systems   Constitutional:  Negative for chills and fever.   HENT:  Negative for ear pain and sore throat.    Eyes:  Negative for pain and visual disturbance.   Respiratory:  Positive for cough, chest tightness and shortness of breath.    Cardiovascular:  Negative for chest pain and palpitations.   Gastrointestinal:  Negative for abdominal pain and vomiting.   Genitourinary:  Negative for dysuria and hematuria.   Musculoskeletal:  Negative for arthralgias and back pain.   Skin:  Negative for color change and rash.   Neurological:  Negative for seizures and syncope.   All other systems reviewed and are negative.          Objective       ED Triage Vitals   Temperature Pulse Blood Pressure Respirations SpO2 Patient Position - Orthostatic VS   02/02/25 1225 02/02/25 1225 02/02/25 1225 02/02/25 1225 02/02/25 1225 02/02/25 1225   98.8 °F (37.1 °C) (!) 110 (!) 187/90 19 94 % Sitting      Temp Source Heart Rate Source BP Location FiO2 (%) Pain Score    02/02/25 1225 02/02/25 1225 02/02/25 1225 -- 02/02/25 1645    Temporal Monitor Left arm  No Pain      Vitals      Date and Time Temp Pulse SpO2 Resp BP Pain Score FACES Pain Rating User   02/02/25 1730 -- 102 96 % 21 143/76 -- -- AA   02/02/25 1700 -- 102 93 % 22 134/75 -- -- AA   02/02/25 1645 -- -- -- -- -- No Pain -- AA   02/02/25 1600 -- 87 93 % 22 140/78 -- -- AA   02/02/25 1530 -- 91 94 % 18 162/79 -- -- AA   02/02/25 1515 -- 89 97 % 22 157/88 -- -- AA   02/02/25 1500 -- 84 99 % 22 154/88 -- -- AA   02/02/25 1445 -- 88 99 % 22 146/90 -- -- AA   02/02/25 1345 -- 100 96 % 22 151/93 -- -- AA   02/02/25 1303 -- 107 -- -- 162/91 -- -- EN   02/02/25 1228 -- -- 94 % -- -- -- --    02/02/25 1225 98.8 °F  (37.1 °C) 110 94 % 19 187/90 -- --             Physical Exam  Vitals and nursing note reviewed.   Constitutional:       General: He is not in acute distress.     Appearance: Normal appearance. He is not ill-appearing, toxic-appearing or diaphoretic.      Comments: Patient sitting in bed in no acute distress.   HENT:      Head: Normocephalic and atraumatic.   Eyes:      General: No scleral icterus.        Right eye: No discharge.         Left eye: No discharge.      Extraocular Movements: Extraocular movements intact.      Conjunctiva/sclera: Conjunctivae normal.   Cardiovascular:      Rate and Rhythm: Tachycardia present.      Pulses: Normal pulses.      Heart sounds: Normal heart sounds. No murmur heard.     No friction rub. No gallop.   Pulmonary:      Effort: Pulmonary effort is normal. No respiratory distress.      Breath sounds: Normal breath sounds. No stridor. No wheezing, rhonchi or rales.      Comments: Minimal end expiratory wheezing.  Lung sounds in all lung fields.  No rales, rhonchi  Abdominal:      General: Abdomen is flat. Bowel sounds are normal. There is no distension.      Palpations: Abdomen is soft.      Tenderness: There is no abdominal tenderness. There is no guarding or rebound.      Comments: Abdomen soft, nontender to palpation.  No rigidity, guarding, rebound.   Musculoskeletal:         General: No swelling. Normal range of motion.      Cervical back: Normal range of motion. No rigidity.      Right lower leg: No edema.      Left lower leg: No edema.      Comments: No lower extremity swelling.   Skin:     General: Skin is warm and dry.      Capillary Refill: Capillary refill takes less than 2 seconds.      Coloration: Skin is not jaundiced.      Findings: No bruising or lesion.   Neurological:      General: No focal deficit present.      Mental Status: He is alert and oriented to person, place, and time. Mental status is at baseline.   Psychiatric:         Mood and Affect: Mood normal.          Behavior: Behavior normal.         Thought Content: Thought content normal.         Judgment: Judgment normal.         Results Reviewed       Procedure Component Value Units Date/Time    Blood culture #1 [[id number]] Collected: 02/02/25 1403    Lab Status: Preliminary result Specimen: Blood from Arm, Right Updated: 02/05/25 0101     Blood Culture No Growth at 48 hrs.    Blood culture #2 [[id number]] Collected: 02/02/25 1403    Lab Status: Preliminary result Specimen: Blood from Hand, Right Updated: 02/05/25 0101     Blood Culture No Growth at 48 hrs.    HS Troponin I 2hr [[id number]] Collected: 02/02/25 1619    Lab Status: Final result Specimen: Blood from Arm, Right Updated: 02/02/25 1658     hs TnI 2hr <2 ng/L      Delta 2hr hsTnI --    FLU/RSV/COVID - if FLU/RSV clinically relevant (2hr TAT) [[id number]]  (Abnormal) Collected: 02/02/25 1403    Lab Status: Final result Specimen: Nares from Nose Updated: 02/02/25 1453     SARS-CoV-2 Negative     INFLUENZA A PCR Positive     INFLUENZA B PCR Negative     RSV PCR Negative    Narrative:      This test has been performed using the CoV-2/Flu/RSV plus assay on the [employer] GeneXpert platform. This test has been validated by the  and verified by the performing laboratory.     This test is designed to amplify and detect the following: nucleocapsid (N), envelope (E), and RNA-dependent RNA polymerase (RdRP) genes of the SARS-CoV-2 genome; matrix (M), basic polymerase (PB2), and acidic protein (PA) segments of the influenza A genome; matrix (M) and non-structural protein (NS) segments of the influenza B genome, and the nucleocapsid genes of RSV A and RSV B.     Positive results are indicative of the presence of Flu A, Flu B, RSV, and/or SARS-CoV-2 RNA. Positive results for SARS-CoV-2 or suspected novel influenza should be reported to state, local, or federal health departments according to local reporting requirements.      All results should be assessed in  conjunction with clinical presentation and other laboratory markers for clinical management.     FOR PEDIATRIC PATIENTS - copy/paste COVID Guidelines URL to browser: [email or web address]       Procalcitonin [[id number]]  (Normal) Collected: 02/02/25 1403    Lab Status: Final result Specimen: Blood from Arm, Right Updated: 02/02/25 1442     Procalcitonin 0.07 ng/ml     HS Troponin 0hr (reflex protocol) [[id number]]  (Normal) Collected: 02/02/25 1403    Lab Status: Final result Specimen: Blood from Arm, Right Updated: 02/02/25 1439     hs TnI 0hr <2 ng/L     B-Type Natriuretic Peptide(BNP) [[id number]]  (Normal) Collected: 02/02/25 1403    Lab Status: Final result Specimen: Blood from Arm, Right Updated: 02/02/25 1438     BNP 22 pg/mL     D-dimer, quantitative [[id number]]  (Normal) Collected: 02/02/25 1359    Lab Status: Final result Specimen: Blood Updated: 02/02/25 1435     D-Dimer, Quant 0.38 ug/ml FEU     Narrative:      In the evaluation for possible pulmonary embolism, in the appropriate (Well's Score of 4 or less) patient, the age adjusted d-dimer cutoff for this patient can be calculated as:    Age x 0.01 (in ug/mL) for Age-adjusted D-dimer exclusion threshold for a patient over 50 years.    Protime-INR [[id number]]  (Normal) Collected: 02/02/25 1359    Lab Status: Final result Specimen: Blood Updated: 02/02/25 1435     Protime 14.1 seconds      INR 1.02    Narrative:      INR Therapeutic Range    Indication                                             INR Range      Atrial Fibrillation                                               2.0-3.0  Hypercoagulable State                                    2.0.2.3  Left Ventricular Asist Device                            2.0-3.0  Mechanical Heart Valve                                  -    Aortic(with afib, MI, embolism, HF, LA enlargement,    and/or coagulopathy)                                     2.0-3.0 (2.5-3.5)      Mitral                                                             2.5-3.5  Prosthetic/Bioprosthetic Heart Valve               2.0-3.0  Venous thromboembolism (VTE: VT, PE        2.0-3.0    APTT [[id number]]  (Normal) Collected: 02/02/25 1359    Lab Status: Final result Specimen: Blood Updated: 02/02/25 1435     PTT 32 seconds     Lactic acid [[id number]]  (Normal) Collected: 02/02/25 1403    Lab Status: Final result Specimen: Blood from Arm, Right Updated: 02/02/25 1432     LACTIC ACID 1.3 mmol/L     Narrative:      Result may be elevated if tourniquet was used during collection.    Comprehensive metabolic panel [[id number]]  (Abnormal) Collected: 02/02/25 1359    Lab Status: Final result Specimen: Blood Updated: 02/02/25 1431     Sodium 134 mmol/L      Potassium 4.7 mmol/L      Chloride 102 mmol/L      CO2 25 mmol/L      ANION GAP 7 mmol/L      BUN 14 mg/dL      Creatinine 0.95 mg/dL      Glucose 106 mg/dL      Calcium 8.9 mg/dL      AST 12 U/L      ALT 14 U/L      Alkaline Phosphatase 66 U/L      Total Protein 7.7 g/dL      Albumin 4.6 g/dL      Total Bilirubin 0.40 mg/dL      eGFR 84 ml/min/1.73sq m     Narrative:      National Kid[employer] Foundation guidelines for Chronic Kidney Disease (CKD):     Stage 1 with normal or high GFR (GFR > 90 mL/min/1.73 square meters)    Stage 2 Mild CKD (GFR = 60-89 mL/min/1.73 square meters)    Stage 3A Moderate CKD (GFR = 45-59 mL/min/1.73 square meters)    Stage 3B Moderate CKD (GFR = 30-44 mL/min/1.73 square meters)    Stage 4 Severe CKD (GFR = 15-29 mL/min/1.73 square meters)    Stage 5 End Stage CKD (GFR <15 mL/min/1.73 square meters)  Note: GFR calculation is accurate only with a steady state creatinine    CBC and differential [[id number]]  (Abnormal) Collected: 02/02/25 1403    Lab Status: Final result Specimen: Blood from Arm, Right Updated: 02/02/25 1416     WBC 8.86 Thousand/uL      RBC 4.90 Million/uL      Hemoglobin 15.6 g/dL      Hematocrit 47.2 %      MCV 96 fL       MCH 31.8 pg      MCHC 33.1 g/dL      RDW 13.5 %      MPV 9.4 fL      Platelets 271 Thousands/uL      nRBC 0 /100 WBCs      Segmented % 79 %      Immature Grans % 1 %      Lymphocytes % 9 %      Monocytes % 9 %      Eosinophils Relative 1 %      Basophils Relative 1 %      Absolute Neutrophils 6.98 Thousands/µL      Absolute Immature Grans 0.05 Thousand/uL      Absolute Lymphocytes 0.83 Thousands/µL      Absolute Monocytes 0.83 Thousand/µL      Eosinophils Absolute 0.10 Thousand/µL      Basophils Absolute 0.07 Thousands/µL             XR chest 2 views   Final Interpretation by Liang Flores MD ( 557)      No acute cardiopulmonary disease.            Workstation performed: [id number]             Procedures    ED Medication and Procedure Management   Prior to Admission Medications   Prescriptions Last Dose Informant Patient Reported? Taking?   LORazepam (Ativan) 0.5 mg tablet   No No   Sig: Take 1 tablet (0.5 mg total) by mouth daily at bedtime as needed for anxiety   albuterol (2.5 mg/3 mL) 0.083 % nebulizer solution   No No   Sig: Take 3 mL (2.5 mg total) by nebulization every 6 (six) hours as needed for wheezing or shortness of breath   albuterol (PROAIR HFA) 90 mcg/act inhaler  Self No No   Sig: Inhale 1 puff every 4 (four) hours as needed for wheezing or shortness of breath   apixaban (Eliquis) 5 mg   No No   Sig: Take 1 tablet (5 mg total) by mouth 2 (two) times a day   aspirin 81 MG tablet  Self Yes No   Sig: Take 1 tablet by mouth daily   carvedilol (COREG) 3.125 mg tablet   No No   Sig: TAKE ONE TABLET BY MOUTH TWICE A DAY   fluticasone-salmeterol (Advair) 250-50 mcg/dose inhaler  Self Yes No   Sig: Inhale   losartan (COZAAR) 25 mg tablet   No No   Sig: Take 1 tablet (25 mg total) by mouth daily   montelukast (SINGULAIR) 10 mg tablet  Self Yes No   Sig: Take by mouth   predniSONE 5 mg tablet   No No   Si mg PO daily x 1 day, then 15 mg PO daily x 1 day, then 10mg PO daily x 1 days then 5 mg PO daily    umeclidinium-vilanterol (Anoro Ellipta) 62.5-25 MCG/INH inhaler  Self Yes No   Sig: Anoro Ellipta 62.5 mcg-25 mcg/actuation powder for inhalation   INHALE ONE PUFF BY MOUTH EVERY DAY      Facility-Administered Medications: None     Discharge Medication List as of 2/2/2025  5:14 PM        CONTINUE these medications which have NOT CHANGED    Details   albuterol (2.5 mg/3 mL) 0.083 % nebulizer solution Take 3 mL (2.5 mg total) by nebulization every 6 (six) hours as needed for wheezing or shortness of breath, Starting Thu 9/21/2023, Normal      albuterol (PROAIR HFA) 90 mcg/act inhaler Inhale 1 puff every 4 (four) hours as needed for wheezing or shortness of breath, Starting Tue 2/6/2018, Normal      apixaban (Eliquis) 5 mg Take 1 tablet (5 mg total) by mouth 2 (two) times a day, Starting Fri 12/13/2024, Normal      aspirin 81 MG tablet Take 1 tablet by mouth daily, Starting Thu 2/19/2015, Historical Med      carvedilol (COREG) 3.125 mg tablet TAKE ONE TABLET BY MOUTH TWICE A DAY, Normal      fluticasone-salmeterol (Advair) 250-50 mcg/dose inhaler Inhale, Historical Med      LORazepam (Ativan) 0.5 mg tablet Take 1 tablet (0.5 mg total) by mouth daily at bedtime as needed for anxiety, Starting Thu 8/29/2024, Until Thu 1/30/2025 at 2359, Normal      losartan (COZAAR) 25 mg tablet Take 1 tablet (25 mg total) by mouth daily, Starting Thu 9/19/2024, Normal      montelukast (SINGULAIR) 10 mg tablet Take by mouth, Historical Med      predniSONE 5 mg tablet 20 mg PO daily x 1 day, then 15 mg PO daily x 1 day, then 10mg PO daily x 1 days then 5 mg PO daily, Normal      umeclidinium-vilanterol (Anoro Ellipta) 62.5-25 MCG/INH inhaler Anoro Ellipta 62.5 mcg-25 mcg/actuation powder for inhalation   INHALE ONE PUFF BY MOUTH EVERY DAY, Historical Med             ED SEPSIS DOCUMENTATION   Time reflects when diagnosis was documented in both MDM as applicable and the Disposition within this note       Time User Action Codes Description  Comment    2/2/2025  5:11 PM Tania Martinez Add [J10.1] Type A influenza     2/2/2025  5:11 PM Tania Martinez Add [R07.9] Chest pain, unspecified                  Tania Martinez DO  02/05/25 131

## 2025-02-02 NOTE — DISCHARGE INSTRUCTIONS
Return to the ER for new or worsening symptoms.  You opted not to begin Tamiflu today.  You tested positive for influenza type A today.  Stay well-hydrated, use over-the-counter medications as needed.

## 2025-02-02 NOTE — Clinical Note
Moy Dickerson was seen and treated in our emergency department on 2/2/2025.                Diagnosis:     Moy  may return to work on return date.    He may return on this date: 02/06/2025         If you have any questions or concerns, please don't hesitate to call.      Tania Martinez, DO    ______________________________           _______________          _______________  Hospital Representative                              Date                                Time

## 2025-02-04 ENCOUNTER — HOSPITAL ENCOUNTER (EMERGENCY)
Facility: HOSPITAL | Age: 65
Discharge: HOME | End: 2025-02-04
Payer: COMMERCIAL

## 2025-02-04 ENCOUNTER — APPOINTMENT (EMERGENCY)
Dept: CT IMAGING | Facility: HOSPITAL | Age: 65
End: 2025-02-04
Payer: COMMERCIAL

## 2025-02-04 VITALS
SYSTOLIC BLOOD PRESSURE: 169 MMHG | RESPIRATION RATE: 20 BRPM | OXYGEN SATURATION: 96 % | DIASTOLIC BLOOD PRESSURE: 82 MMHG | HEART RATE: 81 BPM | TEMPERATURE: 97.4 F

## 2025-02-04 DIAGNOSIS — J11.1 FLU: Primary | ICD-10-CM

## 2025-02-04 DIAGNOSIS — J44.1 COPD WITH ACUTE EXACERBATION (HCC): ICD-10-CM

## 2025-02-04 LAB
ALBUMIN SERPL BCG-MCNC: 4.3 G/DL (ref 3.5–5)
ALP SERPL-CCNC: 54 U/L (ref 34–104)
ALT SERPL W P-5'-P-CCNC: 15 U/L (ref 7–52)
ANION GAP SERPL CALCULATED.3IONS-SCNC: 10 MMOL/L (ref 4–13)
APTT PPP: 28 SECONDS (ref 23–34)
AST SERPL W P-5'-P-CCNC: 13 U/L (ref 13–39)
ATRIAL RATE: 76 BPM
BASOPHILS # BLD AUTO: 0.02 THOUSANDS/ΜL (ref 0–0.1)
BASOPHILS NFR BLD AUTO: 0 % (ref 0–1)
BILIRUB SERPL-MCNC: 0.31 MG/DL (ref 0.2–1)
BUN SERPL-MCNC: 19 MG/DL (ref 5–25)
CALCIUM SERPL-MCNC: 9.1 MG/DL (ref 8.4–10.2)
CHLORIDE SERPL-SCNC: 106 MMOL/L (ref 96–108)
CO2 SERPL-SCNC: 22 MMOL/L (ref 21–32)
CREAT SERPL-MCNC: 0.87 MG/DL (ref 0.6–1.3)
EOSINOPHIL # BLD AUTO: 0 THOUSAND/ΜL (ref 0–0.61)
EOSINOPHIL NFR BLD AUTO: 0 % (ref 0–6)
ERYTHROCYTE [DISTWIDTH] IN BLOOD BY AUTOMATED COUNT: 13.3 % (ref 11.6–15.1)
GFR SERPL CREATININE-BSD FRML MDRD: 91 ML/MIN/1.73SQ M
GLUCOSE SERPL-MCNC: 108 MG/DL (ref 65–140)
HCT VFR BLD AUTO: 42.9 % (ref 36.5–49.3)
HGB BLD-MCNC: 14.4 G/DL (ref 12–17)
IMM GRANULOCYTES # BLD AUTO: 0.05 THOUSAND/UL (ref 0–0.2)
IMM GRANULOCYTES NFR BLD AUTO: 1 % (ref 0–2)
INR PPP: 0.98 (ref 0.85–1.19)
LYMPHOCYTES # BLD AUTO: 0.82 THOUSANDS/ΜL (ref 0.6–4.47)
LYMPHOCYTES NFR BLD AUTO: 9 % (ref 14–44)
MCH RBC QN AUTO: 32.3 PG (ref 26.8–34.3)
MCHC RBC AUTO-ENTMCNC: 33.6 G/DL (ref 31.4–37.4)
MCV RBC AUTO: 96 FL (ref 82–98)
MONOCYTES # BLD AUTO: 0.38 THOUSAND/ΜL (ref 0.17–1.22)
MONOCYTES NFR BLD AUTO: 4 % (ref 4–12)
NEUTROPHILS # BLD AUTO: 8.35 THOUSANDS/ΜL (ref 1.85–7.62)
NEUTS SEG NFR BLD AUTO: 86 % (ref 43–75)
NRBC BLD AUTO-RTO: 0 /100 WBCS
P AXIS: 66 DEGREES
PLATELET # BLD AUTO: 292 THOUSANDS/UL (ref 149–390)
PMV BLD AUTO: 9.4 FL (ref 8.9–12.7)
POTASSIUM SERPL-SCNC: 4.4 MMOL/L (ref 3.5–5.3)
PR INTERVAL: 128 MS
PROT SERPL-MCNC: 7.3 G/DL (ref 6.4–8.4)
PROTHROMBIN TIME: 13.7 SECONDS (ref 12.3–15)
QRS AXIS: 69 DEGREES
QRSD INTERVAL: 84 MS
QT INTERVAL: 372 MS
QTC INTERVAL: 418 MS
RBC # BLD AUTO: 4.46 MILLION/UL (ref 3.88–5.62)
SODIUM SERPL-SCNC: 138 MMOL/L (ref 135–147)
T WAVE AXIS: 86 DEGREES
VENTRICULAR RATE: 76 BPM
WBC # BLD AUTO: 9.62 THOUSAND/UL (ref 4.31–10.16)

## 2025-02-04 PROCEDURE — 99285 EMERGENCY DEPT VISIT HI MDM: CPT

## 2025-02-04 PROCEDURE — 93010 ELECTROCARDIOGRAM REPORT: CPT | Performed by: INTERNAL MEDICINE

## 2025-02-04 PROCEDURE — 85610 PROTHROMBIN TIME: CPT

## 2025-02-04 PROCEDURE — 80053 COMPREHEN METABOLIC PANEL: CPT

## 2025-02-04 PROCEDURE — 36415 COLL VENOUS BLD VENIPUNCTURE: CPT

## 2025-02-04 PROCEDURE — 96365 THER/PROPH/DIAG IV INF INIT: CPT

## 2025-02-04 PROCEDURE — 94644 CONT INHLJ TX 1ST HOUR: CPT

## 2025-02-04 PROCEDURE — 96366 THER/PROPH/DIAG IV INF ADDON: CPT

## 2025-02-04 PROCEDURE — 85025 COMPLETE CBC W/AUTO DIFF WBC: CPT

## 2025-02-04 PROCEDURE — 93005 ELECTROCARDIOGRAM TRACING: CPT

## 2025-02-04 PROCEDURE — 85730 THROMBOPLASTIN TIME PARTIAL: CPT

## 2025-02-04 PROCEDURE — 94640 AIRWAY INHALATION TREATMENT: CPT

## 2025-02-04 PROCEDURE — 96375 TX/PRO/DX INJ NEW DRUG ADDON: CPT

## 2025-02-04 PROCEDURE — 71275 CT ANGIOGRAPHY CHEST: CPT

## 2025-02-04 RX ORDER — ALBUTEROL SULFATE 90 UG/1
2 INHALANT RESPIRATORY (INHALATION) EVERY 6 HOURS PRN
Qty: 8.5 G | Refills: 0 | Status: SHIPPED | OUTPATIENT
Start: 2025-02-04

## 2025-02-04 RX ORDER — ALBUTEROL SULFATE 0.83 MG/ML
2.5 SOLUTION RESPIRATORY (INHALATION) EVERY 6 HOURS PRN
Qty: 75 ML | Refills: 0 | Status: SHIPPED | OUTPATIENT
Start: 2025-02-04

## 2025-02-04 RX ORDER — MAGNESIUM SULFATE HEPTAHYDRATE 40 MG/ML
2 INJECTION, SOLUTION INTRAVENOUS ONCE
Status: COMPLETED | OUTPATIENT
Start: 2025-02-04 | End: 2025-02-04

## 2025-02-04 RX ORDER — METHYLPREDNISOLONE SODIUM SUCCINATE 125 MG/2ML
80 INJECTION, POWDER, LYOPHILIZED, FOR SOLUTION INTRAMUSCULAR; INTRAVENOUS ONCE
Status: COMPLETED | OUTPATIENT
Start: 2025-02-04 | End: 2025-02-04

## 2025-02-04 RX ORDER — SODIUM CHLORIDE FOR INHALATION 0.9 %
12 VIAL, NEBULIZER (ML) INHALATION ONCE
Status: COMPLETED | OUTPATIENT
Start: 2025-02-04 | End: 2025-02-04

## 2025-02-04 RX ORDER — ALBUTEROL SULFATE 5 MG/ML
10 SOLUTION RESPIRATORY (INHALATION) ONCE
Status: COMPLETED | OUTPATIENT
Start: 2025-02-04 | End: 2025-02-04

## 2025-02-04 RX ADMIN — MAGNESIUM SULFATE HEPTAHYDRATE 2 G: 40 INJECTION, SOLUTION INTRAVENOUS at 13:11

## 2025-02-04 RX ADMIN — ISODIUM CHLORIDE 12 ML: 0.03 SOLUTION RESPIRATORY (INHALATION) at 13:20

## 2025-02-04 RX ADMIN — ALBUTEROL SULFATE 10 MG: 2.5 SOLUTION RESPIRATORY (INHALATION) at 13:21

## 2025-02-04 RX ADMIN — IOHEXOL 85 ML: 350 INJECTION, SOLUTION INTRAVENOUS at 14:53

## 2025-02-04 RX ADMIN — METHYLPREDNISOLONE SODIUM SUCCINATE 80 MG: 125 INJECTION, POWDER, FOR SOLUTION INTRAMUSCULAR; INTRAVENOUS at 13:11

## 2025-02-04 RX ADMIN — IPRATROPIUM BROMIDE 1 MG: 0.5 SOLUTION RESPIRATORY (INHALATION) at 13:20

## 2025-02-04 NOTE — Clinical Note
Moy Dickerson was seen and treated in our emergency department on 2/4/2025.                Diagnosis:     Moy  may return to work on return date.    He may return on this date: 02/12/2025         If you have any questions or concerns, please don't hesitate to call.      NATE Isabel    ______________________________           _______________          _______________  Hospital Representative                              Date                                Time

## 2025-02-04 NOTE — ED PROVIDER NOTES
Time reflects when diagnosis was documented in both MDM as applicable and the Disposition within this note       Time User Action Codes Description Comment    2/4/2025  3:57 PM Denise Marsh Add [J11.1] Flu     2/4/2025  3:57 PM Denise Marsh Add [J44.1] COPD with acute exacerbation (HCC)           ED Disposition       ED Disposition   Discharge    Condition   Stable    Date/Time   Tue Feb 4, 2025  3:57 PM    Comment   Moy Dickerson discharge to home/self care.                   Assessment & Plan       Medical Decision Making  This patient presents with dyspnea.  Presentation not consistent with acute cardiac etiologies to include ACS-nonischemic EKG  Presentation not consistent presentation not consistent with congestive heart failure, pericardial effusion/tamponade, acute PE, asthma/COPD exacerbation or infectious etiology such as pneumonia.  Presentation also not consistent with benign cardiopulmonary causes to include toxidromes, metabolic etiologies such as acidemia or electrolyte derangements.    Patient CBC resulted no leukocytosis, anemia.  CMP negative for metabolic derangements.  CTA PE negative. Advised to follow-up with pulmonology and or primary care provider.  Return to the ER symptoms worsens or questions or concerns arise at home.      Amount and/or Complexity of Data Reviewed  Labs: ordered.  Radiology: ordered. Decision-making details documented in ED Course.    Risk  Prescription drug management.        ED Course as of 02/04/25 1734   Tue Feb 04, 2025   1423 Patient was reassessed after heart neb and adventitious lung sounds improved   1557 CTA chest pe study  No pulmonary embolus or other acute findings in the chest.     Mild paraseptal emphysema.         Medications   albuterol inhalation solution 10 mg (10 mg Nebulization Given 2/4/25 1321)   ipratropium (ATROVENT) 0.02 % inhalation solution 1 mg (1 mg Nebulization Given 2/4/25 1320)   sodium chloride 0.9 % inhalation solution 12 mL (12 mL  "Nebulization Given 2/4/25 1320)   magnesium sulfate 2 g/50 mL IVPB (premix) 2 g (0 g Intravenous Stopped 2/4/25 1619)   methylPREDNISolone sodium succinate (Solu-MEDROL) injection 80 mg (80 mg Intravenous Given 2/4/25 1311)   iohexol (OMNIPAQUE) 350 MG/ML injection (MULTI-DOSE) 85 mL (85 mL Intravenous Given 2/4/25 1453)       ED Risk Strat Scores                                              History of Present Illness       Chief Complaint   Patient presents with    Shortness of Breath     +for the flu, increased shortness of breath over the last 2 days. \"Feels like something is stuck in my chest\"        Past Medical History:   Diagnosis Date    Asthma     Cardiomyopathy (HCC)     COPD (chronic obstructive pulmonary disease) (HCC)     Hypertension       History reviewed. No pertinent surgical history.   Family History   Problem Relation Age of Onset    Deep vein thrombosis Father       Social History     Tobacco Use    Smoking status: Every Day     Current packs/day: 0.50     Types: Cigarettes    Smokeless tobacco: Never   Vaping Use    Vaping status: Never Used   Substance Use Topics    Alcohol use: No    Drug use: No      E-Cigarette/Vaping    E-Cigarette Use Never User       E-Cigarette/Vaping Substances      I have reviewed and agree with the history as documented.     65 y/o male patient presents to the ER for evaluation of cough. Patient was diagnosed with flu two days ago. He reports that he was having worsening shortness of breath this morning prompting him to come back to the ER.         Per ER note from 2/2/25  \"Patient is a 64-year-old male with a past medical history significant for asthma, cardiomyopathy, COPD, hypertension presenting to emergency department for evaluation of cough, congestion.  He notes 3 days of mild congestion.  He was seen by cardiology where he reported he had no symptoms however noted that that morning he was having some nasal congestion.  He was seen at urgent care who placed him " "on Augmentin.  He has been on Augmentin for 2 full days now.  He notes ongoing coughing that is productive.  He is unsure what color sputum.  Notes some mild chills that started last night.  Denies numbness, weakness, tingling of his extremities.  Notes some chest pain that he describes as a heaviness around 2 AM that resolved.  Notes some mild dyspnea and chest heaviness currently.  Denies lower extremity swelling or recent weight gain.  Unsure if he has a fever.  He is anticoagulated on Eliquis for prior PE that was unprovoked.\"                     Shortness of Breath  Associated symptoms: headaches    Associated symptoms: no abdominal pain, no chest pain, no cough, no ear pain, no fever, no rash, no sore throat and no vomiting        Review of Systems   Constitutional:  Positive for activity change, appetite change, chills and fatigue. Negative for fever.   HENT:  Positive for congestion. Negative for ear pain and sore throat.    Eyes:  Negative for pain and visual disturbance.   Respiratory:  Positive for chest tightness and shortness of breath. Negative for cough.    Cardiovascular:  Negative for chest pain and palpitations.   Gastrointestinal:  Negative for abdominal pain and vomiting.   Genitourinary:  Negative for dysuria and hematuria.   Musculoskeletal:  Negative for arthralgias and back pain.   Skin:  Negative for color change and rash.   Neurological:  Positive for headaches. Negative for seizures and syncope.   All other systems reviewed and are negative.          Objective       ED Triage Vitals [02/04/25 1123]   Temperature Pulse Blood Pressure Respirations SpO2 Patient Position - Orthostatic VS   (!) 97.4 °F (36.3 °C) 81 169/82 20 96 % Sitting      Temp Source Heart Rate Source BP Location FiO2 (%) Pain Score    Temporal Monitor Left arm -- --      Vitals      Date and Time Temp Pulse SpO2 Resp BP Pain Score FACES Pain Rating User   02/04/25 1123 97.4 °F (36.3 °C) 81 96 % 20 169/82 -- -- GP      "       Physical Exam  Vitals and nursing note reviewed.   Constitutional:       General: He is not in acute distress.     Appearance: He is well-developed.   HENT:      Head: Normocephalic and atraumatic.   Eyes:      Conjunctiva/sclera: Conjunctivae normal.   Cardiovascular:      Rate and Rhythm: Normal rate and regular rhythm.      Pulses: Normal pulses.      Heart sounds: Normal heart sounds.   Pulmonary:      Effort: Pulmonary effort is normal. No respiratory distress.      Breath sounds: Rhonchi present.   Abdominal:      Palpations: Abdomen is soft.      Tenderness: There is no abdominal tenderness.   Musculoskeletal:         General: No swelling.      Cervical back: Neck supple.   Skin:     General: Skin is warm and dry.      Capillary Refill: Capillary refill takes less than 2 seconds.   Neurological:      Mental Status: He is alert.   Psychiatric:         Mood and Affect: Mood normal.         Results Reviewed       Procedure Component Value Units Date/Time    Protime-INR [[id number]]  (Normal) Collected: 02/04/25 1308    Lab Status: Final result Specimen: Blood from Arm, Left Updated: 02/04/25 1335     Protime 13.7 seconds      INR 0.98    Narrative:      INR Therapeutic Range    Indication                                             INR Range      Atrial Fibrillation                                               2.0-3.0  Hypercoagulable State                                    2.0.2.3  Left Ventricular Asist Device                            2.0-3.0  Mechanical Heart Valve                                  -    Aortic(with afib, MI, embolism, HF, LA enlargement,    and/or coagulopathy)                                     2.0-3.0 (2.5-3.5)     Mitral                                                             2.5-3.5  Prosthetic/Bioprosthetic Heart Valve               2.0-3.0  Venous thromboembolism (VTE: VT, PE        2.0-3.0    APTT [[id number]]  (Normal) Collected: 02/04/25 1308    Lab Status: Final result  Specimen: Blood from Arm, Left Updated: 02/04/25 1335     PTT 28 seconds     Comprehensive metabolic panel [[id number]] Collected: 02/04/25 1308    Lab Status: Final result Specimen: Blood from Arm, Left Updated: 02/04/25 1329     Sodium 138 mmol/L      Potassium 4.4 mmol/L      Chloride 106 mmol/L      CO2 22 mmol/L      ANION GAP 10 mmol/L      BUN 19 mg/dL      Creatinine 0.87 mg/dL      Glucose 108 mg/dL      Calcium 9.1 mg/dL      AST 13 U/L      ALT 15 U/L      Alkaline Phosphatase 54 U/L      Total Protein 7.3 g/dL      Albumin 4.3 g/dL      Total Bilirubin 0.31 mg/dL      eGFR 91 ml/min/1.73sq m     Narrative:      National Kid[employer] Foundation guidelines for Chronic Kidney Disease (CKD):     Stage 1 with normal or high GFR (GFR > 90 mL/min/1.73 square meters)    Stage 2 Mild CKD (GFR = 60-89 mL/min/1.73 square meters)    Stage 3A Moderate CKD (GFR = 45-59 mL/min/1.73 square meters)    Stage 3B Moderate CKD (GFR = 30-44 mL/min/1.73 square meters)    Stage 4 Severe CKD (GFR = 15-29 mL/min/1.73 square meters)    Stage 5 End Stage CKD (GFR <15 mL/min/1.73 square meters)  Note: GFR calculation is accurate only with a steady state creatinine    CBC and differential [[id number]]  (Abnormal) Collected: 02/04/25 1308    Lab Status: Final result Specimen: Blood from Arm, Left Updated: 02/04/25 1313     WBC 9.62 Thousand/uL      RBC 4.46 Million/uL      Hemoglobin 14.4 g/dL      Hematocrit 42.9 %      MCV 96 fL      MCH 32.3 pg      MCHC 33.6 g/dL      RDW 13.3 %      MPV 9.4 fL      Platelets 292 Thousands/uL      nRBC 0 /100 WBCs      Segmented % 86 %      Immature Grans % 1 %      Lymphocytes % 9 %      Monocytes % 4 %      Eosinophils Relative 0 %      Basophils Relative 0 %      Absolute Neutrophils 8.35 Thousands/µL      Absolute Immature Grans 0.05 Thousand/uL      Absolute Lymphocytes 0.82 Thousands/µL      Absolute Monocytes 0.38 Thousand/µL      Eosinophils Absolute 0.00 Thousand/µL      Basophils  Absolute 0.02 Thousands/µL             CTA chest pe study   Final Interpretation by Jonathan Prajapati MD (02/04 7993)      No pulmonary embolus or other acute findings in the chest.      Mild paraseptal emphysema.                  Resident: CHARLES AMBRIZ I, the attending radiologist, have reviewed the images and agree with the final report above.      Workstation performed: [id number]             ECG 12 Lead Documentation Only    Date/Time: 2/4/2025 11:26 AM    Performed by: NATE Isabel  Authorized by: NATE Isabel    Indications / Diagnosis:  Shortness of breath  ECG reviewed by me, the ED Provider: yes    Patient location:  ED  Previous ECG:     Previous ECG:  Compared to current    Comparison ECG info:  2/2/25    Similarity:  No change    Comparison to cardiac monitor: Yes    Interpretation:     Interpretation: normal    Rate:     ECG rate:  76    ECG rate assessment: normal    Rhythm:     Rhythm: sinus rhythm    Ectopy:     Ectopy: none    QRS:     QRS axis:  Normal    QRS intervals:  Normal  Conduction:     Conduction: normal    ST segments:     ST segments:  Normal  T waves:     T waves: normal        ED Medication and Procedure Management   Prior to Admission Medications   Prescriptions Last Dose Informant Patient Reported? Taking?   LORazepam (Ativan) 0.5 mg tablet   No No   Sig: Take 1 tablet (0.5 mg total) by mouth daily at bedtime as needed for anxiety   albuterol (2.5 mg/3 mL) 0.083 % nebulizer solution   No No   Sig: Take 3 mL (2.5 mg total) by nebulization every 6 (six) hours as needed for wheezing or shortness of breath   albuterol (PROAIR HFA) 90 mcg/act inhaler  Self No No   Sig: Inhale 1 puff every 4 (four) hours as needed for wheezing or shortness of breath   apixaban (Eliquis) 5 mg   No No   Sig: Take 1 tablet (5 mg total) by mouth 2 (two) times a day   aspirin 81 MG tablet  Self Yes No   Sig: Take 1 tablet by mouth daily   carvedilol (COREG) 3.125 mg tablet   No No   Sig: TAKE  ONE TABLET BY MOUTH TWICE A DAY   fluticasone-salmeterol (Advair) 250-50 mcg/dose inhaler  Self Yes No   Sig: Inhale   losartan (COZAAR) 25 mg tablet   No No   Sig: Take 1 tablet (25 mg total) by mouth daily   montelukast (SINGULAIR) 10 mg tablet  Self Yes No   Sig: Take by mouth   predniSONE 5 mg tablet   No No   Si mg PO daily x 1 day, then 15 mg PO daily x 1 day, then 10mg PO daily x 1 days then 5 mg PO daily   predniSONE 50 mg tablet   No No   Sig: Take 1 tablet (50 mg total) by mouth daily for 4 days   umeclidinium-vilanterol (Anoro Ellipta) 62.5-25 MCG/INH inhaler  Self Yes No   Sig: Anoro Ellipta 62.5 mcg-25 mcg/actuation powder for inhalation   INHALE ONE PUFF BY MOUTH EVERY DAY      Facility-Administered Medications: None     Discharge Medication List as of 2025  4:02 PM        START taking these medications    Details   !! albuterol (2.5 mg/3 mL) 0.083 % nebulizer solution Take 3 mL (2.5 mg total) by nebulization every 6 (six) hours as needed for wheezing or shortness of breath, Starting 2025, Normal      !! albuterol (ProAir HFA) 90 mcg/act inhaler Inhale 2 puffs every 6 (six) hours as needed for wheezing, Starting 2025, Normal       !! - Potential duplicate medications found. Please discuss with provider.        CONTINUE these medications which have NOT CHANGED    Details   !! albuterol (2.5 mg/3 mL) 0.083 % nebulizer solution Take 3 mL (2.5 mg total) by nebulization every 6 (six) hours as needed for wheezing or shortness of breath, Starting 2023, Normal      !! albuterol (PROAIR HFA) 90 mcg/act inhaler Inhale 1 puff every 4 (four) hours as needed for wheezing or shortness of breath, Starting 2018, Normal      apixaban (Eliquis) 5 mg Take 1 tablet (5 mg total) by mouth 2 (two) times a day, Starting 2024, Normal      aspirin 81 MG tablet Take 1 tablet by mouth daily, Starting Thu 2015, Historical Med      carvedilol (COREG) 3.125 mg tablet TAKE ONE  TABLET BY MOUTH TWICE A DAY, Normal      fluticasone-salmeterol (Advair) 250-50 mcg/dose inhaler Inhale, Historical Med      LORazepam (Ativan) 0.5 mg tablet Take 1 tablet (0.5 mg total) by mouth daily at bedtime as needed for anxiety, Starting Thu 8/29/2024, Until Thu 1/30/2025 at 2359, Normal      losartan (COZAAR) 25 mg tablet Take 1 tablet (25 mg total) by mouth daily, Starting Thu 9/19/2024, Normal      montelukast (SINGULAIR) 10 mg tablet Take by mouth, Historical Med      !! predniSONE 5 mg tablet 20 mg PO daily x 1 day, then 15 mg PO daily x 1 day, then 10mg PO daily x 1 days then 5 mg PO daily, Normal      !! predniSONE 50 mg tablet Take 1 tablet (50 mg total) by mouth daily for 4 days, Starting Sun 2/2/2025, Until Thu 2/6/2025, Normal      umeclidinium-vilanterol (Anoro Ellipta) 62.5-25 MCG/INH inhaler Anoro Ellipta 62.5 mcg-25 mcg/actuation powder for inhalation   INHALE ONE PUFF BY MOUTH EVERY DAY, Historical Med       !! - Potential duplicate medications found. Please discuss with provider.        No discharge procedures on file.  ED SEPSIS DOCUMENTATION   Time reflects when diagnosis was documented in both MDM as applicable and the Disposition within this note       Time User Action Codes Description Comment    2/4/2025  3:57 PM Denise Marsh Add [J11.1] Flu     2/4/2025  3:57 PM Denise Marsh Add [J44.1] COPD with acute exacerbation (HCC)                  NATE Isabel  02/04/25 1732

## 2025-02-04 NOTE — Clinical Note
Moy Dickerson was seen and treated in our emergency department on 2/4/2025.                Diagnosis:     Moy  may return to work on return date.    He may return on this date: 02/10/2025         If you have any questions or concerns, please don't hesitate to call.      NATE Isabel    ______________________________           _______________          _______________  Hospital Representative                              Date                                Time

## 2025-02-04 NOTE — DISCHARGE INSTRUCTIONS
Albuterol- every 4 to 6 hours as needed for shortness of breath/ cough  Follow up with pulmonologist

## 2025-02-08 LAB
BACTERIA BLD CULT: NORMAL
BACTERIA BLD CULT: NORMAL

## 2025-02-20 ENCOUNTER — OFFICE VISIT (OUTPATIENT)
Dept: FAMILY MEDICINE CLINIC | Facility: CLINIC | Age: 65
End: 2025-02-20
Payer: COMMERCIAL

## 2025-02-20 ENCOUNTER — TELEPHONE (OUTPATIENT)
Dept: FAMILY MEDICINE CLINIC | Facility: CLINIC | Age: 65
End: 2025-02-20

## 2025-02-20 VITALS
HEART RATE: 101 BPM | BODY MASS INDEX: 25.18 KG/M2 | TEMPERATURE: 98.1 F | HEIGHT: 70 IN | WEIGHT: 175.9 LBS | RESPIRATION RATE: 20 BRPM | DIASTOLIC BLOOD PRESSURE: 94 MMHG | SYSTOLIC BLOOD PRESSURE: 164 MMHG | OXYGEN SATURATION: 96 %

## 2025-02-20 DIAGNOSIS — J10.1 TYPE A INFLUENZA: ICD-10-CM

## 2025-02-20 DIAGNOSIS — Z12.5 SCREENING FOR MALIGNANT NEOPLASM OF PROSTATE: ICD-10-CM

## 2025-02-20 DIAGNOSIS — R53.83 OTHER FATIGUE: ICD-10-CM

## 2025-02-20 DIAGNOSIS — Z00.00 WELL ADULT EXAM: Primary | ICD-10-CM

## 2025-02-20 DIAGNOSIS — J44.9 CHRONIC OBSTRUCTIVE PULMONARY DISEASE, UNSPECIFIED COPD TYPE (HCC): ICD-10-CM

## 2025-02-20 DIAGNOSIS — Z13.220 SCREENING CHOLESTEROL LEVEL: ICD-10-CM

## 2025-02-20 DIAGNOSIS — Z72.0 TOBACCO ABUSE: Chronic | ICD-10-CM

## 2025-02-20 DIAGNOSIS — I10 HYPERTENSION, ESSENTIAL: Chronic | ICD-10-CM

## 2025-02-20 DIAGNOSIS — R07.9 CHEST PAIN, UNSPECIFIED: ICD-10-CM

## 2025-02-20 PROCEDURE — 99386 PREV VISIT NEW AGE 40-64: CPT | Performed by: FAMILY MEDICINE

## 2025-02-20 NOTE — ASSESSMENT & PLAN NOTE
Orders:  •  Budeson-Glycopyrrol-Formoterol 160-9-4.8 MCG/ACT AERO; Inhale 2 puffs 2 (two) times a day Rinse mouth after use.

## 2025-02-20 NOTE — PROGRESS NOTES
Name: Moy Dickerson      : 1960      MRN: [id number]  Encounter Provider: Jarad Rizzo MD  Encounter Date: 2025   Encounter department: [facility]  :  Assessment & Plan  Well adult exam         Hypertension, essential  Patient is stable with current anti-hypertensive medicine and continue to follow a low sodium diet and take current medication. All questions about this condition were answered today.        Tobacco abuse  Patient encouraged to quit using tobacco for that increases their risk for COPD, lung cancer,stroke, oral cancer and heart disease. If patient does not want to quit they should let me know  when they are interested in quitting. There are numerous options to use to quit and we can discuss them.        Type A influenza    Orders:  •  Ambulatory Referral to Family Practice    Chest pain, unspecified    Orders:  •  Ambulatory Referral to Family Practice    Chronic obstructive pulmonary disease, unspecified COPD type (HCC)    Orders:  •  Budeson-Glycopyrrol-Formoterol 160-9-4.8 MCG/ACT AERO; Inhale 2 puffs 2 (two) times a day Rinse mouth after use.    Screening cholesterol level    Orders:  •  Lipid Panel with Direct LDL reflex; Future    Screening for malignant neoplasm of prostate    Orders:  •  PSA Total, Diagnostic; Future    Other fatigue    Orders:  •  TSH, 3rd generation with Free T4 reflex; Future           History of Present Illness   HPI  Review of Systems   Constitutional:  Negative for activity change, appetite change, chills, fatigue, fever and unexpected weight change.   HENT:  Negative for congestion, ear pain, hearing loss, mouth sores, postnasal drip, sinus pressure, sinus pain, sneezing and sore throat.    Respiratory:  Negative for apnea, cough, shortness of breath and wheezing.    Cardiovascular:  Negative for chest pain, palpitations and leg swelling.   Gastrointestinal:  Negative for abdominal pain, constipation, diarrhea, nausea and  "vomiting.   Endocrine: Negative for cold intolerance and heat intolerance.   Genitourinary:  Negative for dysuria, frequency and hematuria.   Musculoskeletal:  Negative for arthralgias, back pain, gait problem, joint swelling and neck pain.   Skin:  Negative for rash.   Neurological:  Negative for dizziness, weakness and numbness.   Hematological:  Does not bruise/bleed easily.   Psychiatric/Behavioral:  Negative for agitation, behavioral problems, confusion, hallucinations and sleep disturbance. The patient is not nervous/anxious.        Objective   /94 (BP Location: Left arm, Patient Position: Sitting, Cuff Size: Standard)   Pulse 101   Temp 98.1 °F (36.7 °C)   Resp 20   Ht 5' 10\" (1.778 m)   Wt 79.8 kg (175 lb 14.4 oz)   SpO2 96%   BMI 25.24 kg/m²      Physical Exam  Constitutional:       Appearance: He is well-developed.   HENT:      Head: Normocephalic and atraumatic.      Right Ear: External ear normal.      Left Ear: External ear normal.      Nose: Nose normal.      Mouth/Throat:      Pharynx: Oropharynx is clear. No oropharyngeal exudate.   Eyes:      General: No scleral icterus.     Conjunctiva/sclera: Conjunctivae normal.      Pupils: Pupils are equal, round, and reactive to light.   Cardiovascular:      Rate and Rhythm: Normal rate and regular rhythm.      Heart sounds: Normal heart sounds.   Pulmonary:      Effort: Pulmonary effort is normal.      Breath sounds: Normal breath sounds. No wheezing or rales.   Abdominal:      General: Bowel sounds are normal.      Palpations: Abdomen is soft.      Tenderness: There is no abdominal tenderness. There is no guarding.   Musculoskeletal:         General: Normal range of motion.      Cervical back: Normal range of motion and neck supple.   Skin:     General: Skin is warm and dry.      Findings: No erythema or rash.   Neurological:      Mental Status: He is alert and oriented to person, place, and time. Mental status is at baseline.   Psychiatric:    "      Mood and Affect: Mood normal.         Behavior: Behavior normal.         Thought Content: Thought content normal.         Judgment: Judgment normal.

## 2025-02-21 ENCOUNTER — TELEPHONE (OUTPATIENT)
Age: 65
End: 2025-02-21

## 2025-02-21 NOTE — TELEPHONE ENCOUNTER
The patient called to inform that Dr. Rizzo should contact the pharmacy to inform the reason why they changed the previous medication to  Budeson-Glycopyrrol-Formoterol 160-9-4.8 MCG/ACT AERO.     Please Advice

## 2025-02-21 NOTE — TELEPHONE ENCOUNTER
PA for BREZTRI 160-9-4 SUBMITTED to [employer]    via    [x]CMM-KEY: [id number]      [x]PA sent as URGENT    All office notes, labs and other pertaining documents and studies sent. Clinical questions answered. Awaiting determination from insurance company.     Turnaround time for your insurance to make a decision on your Prior Authorization can take 7-21 business days.

## 2025-02-28 NOTE — TELEPHONE ENCOUNTER
Additional information has been faxed to [employer]- [facility] on plan determination    CMM: [id number]

## 2025-03-21 ENCOUNTER — TELEPHONE (OUTPATIENT)
Age: 65
End: 2025-03-21

## 2025-03-21 NOTE — TELEPHONE ENCOUNTER
PA for BREZTRI APPEALED via       [x]Letter sent to insurance via fax       All necessary records sent. Will await response from insurance company    Turnaround time for a decision to be made on an appeal could take up to 30 business days

## 2025-04-02 DIAGNOSIS — R06.00 DYSPNEA: ICD-10-CM

## 2025-04-02 NOTE — TELEPHONE ENCOUNTER
Received call from [facility] pharmacy requesting med refill    Pt is out of medication for Eliquis 5 mg.    Offered pt Eliquis 5 mg samples pt stated that he will wait for the script to be sent to the pharmacy.

## 2025-04-03 RX ORDER — APIXABAN 5 MG/1
5 TABLET, FILM COATED ORAL 2 TIMES DAILY
Qty: 180 TABLET | Refills: 1 | Status: SHIPPED | OUTPATIENT
Start: 2025-04-03

## 2025-05-09 ENCOUNTER — OFFICE VISIT (OUTPATIENT)
Dept: OBGYN CLINIC | Facility: CLINIC | Age: 65
End: 2025-05-09
Payer: COMMERCIAL

## 2025-05-09 ENCOUNTER — APPOINTMENT (OUTPATIENT)
Dept: RADIOLOGY | Facility: CLINIC | Age: 65
End: 2025-05-09
Attending: FAMILY MEDICINE
Payer: COMMERCIAL

## 2025-05-09 VITALS — WEIGHT: 180 LBS | BODY MASS INDEX: 25.77 KG/M2 | HEIGHT: 70 IN

## 2025-05-09 DIAGNOSIS — M25.522 PAIN IN LEFT ELBOW: ICD-10-CM

## 2025-05-09 DIAGNOSIS — M25.522 PAIN IN LEFT ELBOW: Primary | ICD-10-CM

## 2025-05-09 PROCEDURE — 99204 OFFICE O/P NEW MOD 45 MIN: CPT | Performed by: FAMILY MEDICINE

## 2025-05-09 PROCEDURE — 73080 X-RAY EXAM OF ELBOW: CPT

## 2025-05-09 NOTE — PROGRESS NOTES
"Name: Moy Dickerson      : 1960      MRN: [id number]  Encounter Provider: Benigno Tyler DO  Encounter Date: 2025   Encounter department: [facility] ORTHOPEDIC CARE SPECIAL[facility]  :  Assessment & Plan  Pain in left elbow  Radiographs of the left elbow were obtained and reviewed independently and negative for fracture or dislocation.  My clinical impression is that the patient is experienced a likely tricep tendon rupture.  Patient does have significant ecchymosis and swelling along the posterior medial aspect of the elbow and does have a palpable defect on the posterior aspect of the elbow likely representing retracted tendon.  Additionally there is notable strength deficits with tricep activation.  Would advise patient to follow-up with a stat MRI study for further evaluation.  Patient will return after completion of the MRI study for further discussion  Orders:    XR elbow 3+ vw left; Future        History of Present Illness   HPI  Moy Dickerson is a 64 y.o. male who presents for initial evaluation of left elbow pain.  Patient states that the injury had occurred approximately 2 days ago when attempting to push a heavy mattress upstairs in his father's home.  Was attempting to push the heavy item straight ahead with arm extended and felt a pop in the posterior aspect of his elbow.  States that since that time has been noticing increased bruising in the elbow and swelling along with weakness.  Pain is described as an achy sensation and is minimal.  No numbness or tingling reported      History obtained from: patient    Review of Systems  Pertinent Medical History   Hypertension, COPD, history of pulmonary embolism            Objective   Ht 5' 10\" (1.778 m)   Wt 81.6 kg (180 lb)   BMI 25.83 kg/m²      Physical Exam      Skin: no rashes, lesions, skin discolorations, lacerations  Vasculature: normal radial and ulnar pulse, normal skin color, normal capillary refill in extremity, no upper " extremity edema  Neurologic: Neurologic exam is normal throughout upper extremities, Awake, alert, and oriented x3, no apparent distress.   Musculoskeletal:   Left Elbow Examination:                                                 Skin: normal                                            General Appearance notable swelling and ecchymosis of the elbow along posteromedial aspect                              Muscle Tone: normal                          Palpation-Tenderness:  - medial epicondyle/ - lateral epicondyle, +TTP tricep tendon    Range of Motion  Flexion: full  Extension: full  Supination: full  Pronation: full      Pain with Resisted Motion:   Resisted wrist flexion: -  Resisted wrist extension: -   Resisted pronation/supination: +     Strength:  Deltoids (three heads): normal  Biceps: normal  Triceps: 3/5 strength   Pectoralis H/F: normal    Neurovascular Exam:     Radial/Ulnar/Median Nerves: normal     Administrative Statements   I have spent a total time of 45 minutes in caring for this patient on the day of the visit/encounter including Diagnostic results, Impressions, Counseling / Coordination of care, Documenting in the medical record, Reviewing/placing orders in the medical record (including tests, medications, and/or procedures), and Obtaining or reviewing history  .

## 2025-05-12 ENCOUNTER — HOSPITAL ENCOUNTER (OUTPATIENT)
Dept: MRI IMAGING | Facility: CLINIC | Age: 65
Discharge: HOME | End: 2025-05-12
Attending: FAMILY MEDICINE
Payer: COMMERCIAL

## 2025-05-12 DIAGNOSIS — S46.312A TRICEPS TENDON RUPTURE, LEFT, INITIAL ENCOUNTER: Primary | ICD-10-CM

## 2025-05-12 DIAGNOSIS — M25.522 PAIN IN LEFT ELBOW: ICD-10-CM

## 2025-05-12 PROCEDURE — 73221 MRI JOINT UPR EXTREM W/O DYE: CPT

## 2025-05-14 ENCOUNTER — OFFICE VISIT (OUTPATIENT)
Dept: OBGYN CLINIC | Facility: CLINIC | Age: 65
End: 2025-05-14
Attending: FAMILY MEDICINE
Payer: COMMERCIAL

## 2025-05-14 ENCOUNTER — CONSULT (OUTPATIENT)
Dept: FAMILY MEDICINE CLINIC | Facility: CLINIC | Age: 65
End: 2025-05-14
Payer: COMMERCIAL

## 2025-05-14 VITALS — WEIGHT: 183 LBS | BODY MASS INDEX: 26.2 KG/M2 | HEIGHT: 70 IN

## 2025-05-14 VITALS
BODY MASS INDEX: 26.13 KG/M2 | WEIGHT: 182.5 LBS | SYSTOLIC BLOOD PRESSURE: 158 MMHG | TEMPERATURE: 98.4 F | RESPIRATION RATE: 18 BRPM | DIASTOLIC BLOOD PRESSURE: 94 MMHG | HEIGHT: 70 IN | HEART RATE: 78 BPM | OXYGEN SATURATION: 98 %

## 2025-05-14 DIAGNOSIS — J44.9 CHRONIC OBSTRUCTIVE PULMONARY DISEASE, UNSPECIFIED COPD TYPE (HCC): Primary | ICD-10-CM

## 2025-05-14 DIAGNOSIS — S46.319A: Primary | ICD-10-CM

## 2025-05-14 DIAGNOSIS — I10 ESSENTIAL HYPERTENSION: ICD-10-CM

## 2025-05-14 PROBLEM — S46.312A RUPTURE OF LEFT TRICEPS TENDON: Status: ACTIVE | Noted: 2025-05-14

## 2025-05-14 PROCEDURE — 99213 OFFICE O/P EST LOW 20 MIN: CPT | Performed by: FAMILY MEDICINE

## 2025-05-14 PROCEDURE — 99205 OFFICE O/P NEW HI 60 MIN: CPT | Performed by: ORTHOPAEDIC SURGERY

## 2025-05-14 RX ORDER — LOSARTAN POTASSIUM 50 MG/1
50 TABLET ORAL DAILY
Qty: 30 TABLET | Refills: 1 | Status: SHIPPED | OUTPATIENT
Start: 2025-05-14

## 2025-05-14 RX ORDER — UMECLIDINIUM BROMIDE AND VILANTEROL TRIFENATATE 62.5; 25 UG/1; UG/1
1 POWDER RESPIRATORY (INHALATION) DAILY
COMMUNITY
Start: 2025-05-12

## 2025-05-14 RX ORDER — CHLORHEXIDINE GLUCONATE 40 MG/ML
SOLUTION TOPICAL DAILY PRN
OUTPATIENT
Start: 2025-05-14

## 2025-05-14 RX ORDER — CHLORHEXIDINE GLUCONATE ORAL RINSE 1.2 MG/ML
15 SOLUTION DENTAL ONCE
OUTPATIENT
Start: 2025-05-14 | End: 2025-05-14

## 2025-05-14 RX ORDER — FLUTICASONE FUROATE, UMECLIDINIUM BROMIDE AND VILANTEROL TRIFENATATE 200; 62.5; 25 UG/1; UG/1; UG/1
1 POWDER RESPIRATORY (INHALATION) DAILY
Qty: 60 BLISTER | Refills: 0 | Status: SHIPPED | OUTPATIENT
Start: 2025-05-14 | End: 2025-06-13

## 2025-05-14 RX ORDER — SODIUM CHLORIDE, SODIUM LACTATE, POTASSIUM CHLORIDE, CALCIUM CHLORIDE 600; 310; 30; 20 MG/100ML; MG/100ML; MG/100ML; MG/100ML
20 INJECTION, SOLUTION INTRAVENOUS CONTINUOUS
OUTPATIENT
Start: 2025-05-14

## 2025-05-14 NOTE — ASSESSMENT & PLAN NOTE
Orders:  •  fluticasone-umeclidinium-vilanterol (Trelegy Ellipta) 200-62.5-25 mcg/actuation AEPB inhaler; Inhale 1 puff daily Rinse mouth after use.

## 2025-05-14 NOTE — PROGRESS NOTES
[facility] ORTHOPEDIC SPECIALISTS [address] [address] [phone]       Moy Dickerson  [id number]  1960    ORTHOPAEDIC SURGERY OUTPATIENT NOTE  5/14/2025    :  Assessment & Plan  Triceps tendon rupture, unspecified laterality, initial encounter  Given the patient's significant clinical exam and MRI findings as well as his high level of sport related activity which involves golfing he is indicated for left elbow tricep tendon repair.  He will require medical clearance prior to surgery given that he is on Eliquis for blood clots and will need to be off this prior to his surgery.  The patient understands the risks and benefits of the procedure with risks including pain, stiffness, infection, neurovascular injury, recurrence of symptoms, failure of surgical procedure, inadvertent intraoperative complications, blood loss, blood clots, allergic reaction to anesthesia, stroke, heart attack, all up to and including to death. The patient understood and did consent for surgery today.   Orders:    Case request operating room: REPAIR TENDON, TRICEPS; Standing    Ambulatory referral to surgical optimization; Future           HISTORY:  64 y.o. male presents for 1 week history of left elbow pain.  Patient states he was pushing a bed when he felt a pop in his elbow.  He had immediate pain with bruising and swelling.  He was seen by sports medicine and MRI was obtained which demonstrated a distal tricep tendon rupture.  Patient is right-hand dominant.  He localized the pain to the posterior aspect of the elbow.  Pain is exacerbated with movement and with resisted extension.  The patient is a high-level golf player.    Past Medical History:   Diagnosis Date    Allergic     Asthma     Cardiomyopathy (HCC)     COPD (chronic obstructive pulmonary disease) (HCC)     Hypertension        History reviewed. No pertinent surgical history.    Social History      Socioeconomic History    Marital status: /Civil Union     Spouse name: Not on file    Number of children: Not on file    Years of education: Not on file    Highest education level: Not on file   Occupational History    Not on file   Tobacco Use    Smoking status: Every Day     Current packs/day: 0.50     Types: Cigarettes     Passive exposure: Current    Smokeless tobacco: Never   Vaping Use    Vaping status: Never Used   Substance and Sexual Activity    Alcohol use: No    Drug use: No    Sexual activity: Not on file   Other Topics Concern    Not on file   Social History Narrative    Not on file     Social Drivers of Health     Financial Resource Strain: Not on file   Food Insecurity: Not on file   Transportation Needs: Not on file   Physical Activity: Not on file   Stress: Not on file   Social Connections: Not on file   Intimate Partner Violence: Not on file   Housing Stability: Not on file       Family History   Problem Relation Age of Onset    Deep vein thrombosis Father         Patient's Medications   New Prescriptions    No medications on file   Previous Medications    ALBUTEROL (2.5 MG/3 ML) 0.083 % NEBULIZER SOLUTION    Take 3 mL (2.5 mg total) by nebulization every 6 (six) hours as needed for wheezing or shortness of breath    ANORO ELLIPTA 62.5-25 MCG/ACT INHALER        APIXABAN (ELIQUIS) 5 MG    TAKE ONE TABLET BY MOUTH TWICE A DAY    ASPIRIN 81 MG TABLET    Take 1 tablet by mouth in the morning.    BUDESON-GLYCOPYRROL-FORMOTEROL 160-9-4.8 MCG/ACT AERO    Inhale 2 puffs 2 (two) times a day Rinse mouth after use.    CARVEDILOL (COREG) 3.125 MG TABLET    TAKE ONE TABLET BY MOUTH TWICE A DAY    LORAZEPAM (ATIVAN) 0.5 MG TABLET    Take 1 tablet (0.5 mg total) by mouth daily at bedtime as needed for anxiety    LOSARTAN (COZAAR) 25 MG TABLET    Take 1 tablet (25 mg total) by mouth daily    MONTELUKAST (SINGULAIR) 10 MG TABLET    Take by mouth    PREDNISONE 5 MG TABLET    20 mg PO daily x 1 day, then  "15 mg PO daily x 1 day, then 10mg PO daily x 1 days then 5 mg PO daily   Modified Medications    No medications on file   Discontinued Medications    No medications on file       Allergies[1]     Ht 5' 10\" (1.778 m)   Wt 83 kg (183 lb)   BMI 26.26 kg/m²      REVIEW OF SYSTEMS:  Constitutional: Negative.    HEENT: Negative.    Respiratory: Negative.    Skin: Negative.    Neurological: Negative.    Psychiatric/Behavioral: Negative.  Musculoskeletal: Negative except for that mentioned in the HPI.    Gen: No acute distress, resting comfortably in bed  HEENT: Eyes clear, moist mucus membranes, hearing intact  Respiratory: No audible wheezing or stridor  Cardiovascular: Well Perfused peripherally, 2+ distal pulse  Abdomen: nondistended, no peritoneal signs     PHYSICAL EXAM: Left elbow: Positive swelling.  Positive ecchymosis.  Pain with resisted elbow extension.  3-5 strength with resisted extension.  5-5 strength with flexion.  Full range of motion of the elbow.  Neurovascular intact.  Positive deformity with palpable defect at the tricep footprint.    IMAGING: MRI left elbow: There is a full-thickness triceps tendon rupture with minimal retraction.           [1]   Allergies  Allergen Reactions    Naproxen GI Intolerance    Pollen Extract Sneezing     Other reaction(s): Unknown     "

## 2025-05-14 NOTE — H&P (VIEW-ONLY)
[facility] ORTHOPEDIC SPECIALISTS [address] [address] [phone]       Moy Dickerson  [id number]  1960    ORTHOPAEDIC SURGERY OUTPATIENT NOTE  5/14/2025    :  Assessment & Plan  Triceps tendon rupture, unspecified laterality, initial encounter  Given the patient's significant clinical exam and MRI findings as well as his high level of sport related activity which involves golfing he is indicated for left elbow tricep tendon repair.  He will require medical clearance prior to surgery given that he is on Eliquis for blood clots and will need to be off this prior to his surgery.  The patient understands the risks and benefits of the procedure with risks including pain, stiffness, infection, neurovascular injury, recurrence of symptoms, failure of surgical procedure, inadvertent intraoperative complications, blood loss, blood clots, allergic reaction to anesthesia, stroke, heart attack, all up to and including to death. The patient understood and did consent for surgery today.   Orders:    Case request operating room: REPAIR TENDON, TRICEPS; Standing    Ambulatory referral to surgical optimization; Future           HISTORY:  64 y.o. male presents for 1 week history of left elbow pain.  Patient states he was pushing a bed when he felt a pop in his elbow.  He had immediate pain with bruising and swelling.  He was seen by sports medicine and MRI was obtained which demonstrated a distal tricep tendon rupture.  Patient is right-hand dominant.  He localized the pain to the posterior aspect of the elbow.  Pain is exacerbated with movement and with resisted extension.  The patient is a high-level golf player.    Past Medical History:   Diagnosis Date    Allergic     Asthma     Cardiomyopathy (HCC)     COPD (chronic obstructive pulmonary disease) (HCC)     Hypertension        History reviewed. No pertinent surgical history.    Social History      Socioeconomic History    Marital status: /Civil Union     Spouse name: Not on file    Number of children: Not on file    Years of education: Not on file    Highest education level: Not on file   Occupational History    Not on file   Tobacco Use    Smoking status: Every Day     Current packs/day: 0.50     Types: Cigarettes     Passive exposure: Current    Smokeless tobacco: Never   Vaping Use    Vaping status: Never Used   Substance and Sexual Activity    Alcohol use: No    Drug use: No    Sexual activity: Not on file   Other Topics Concern    Not on file   Social History Narrative    Not on file     Social Drivers of Health     Financial Resource Strain: Not on file   Food Insecurity: Not on file   Transportation Needs: Not on file   Physical Activity: Not on file   Stress: Not on file   Social Connections: Not on file   Intimate Partner Violence: Not on file   Housing Stability: Not on file       Family History   Problem Relation Age of Onset    Deep vein thrombosis Father         Patient's Medications   New Prescriptions    No medications on file   Previous Medications    ALBUTEROL (2.5 MG/3 ML) 0.083 % NEBULIZER SOLUTION    Take 3 mL (2.5 mg total) by nebulization every 6 (six) hours as needed for wheezing or shortness of breath    ANORO ELLIPTA 62.5-25 MCG/ACT INHALER        APIXABAN (ELIQUIS) 5 MG    TAKE ONE TABLET BY MOUTH TWICE A DAY    ASPIRIN 81 MG TABLET    Take 1 tablet by mouth in the morning.    BUDESON-GLYCOPYRROL-FORMOTEROL 160-9-4.8 MCG/ACT AERO    Inhale 2 puffs 2 (two) times a day Rinse mouth after use.    CARVEDILOL (COREG) 3.125 MG TABLET    TAKE ONE TABLET BY MOUTH TWICE A DAY    LORAZEPAM (ATIVAN) 0.5 MG TABLET    Take 1 tablet (0.5 mg total) by mouth daily at bedtime as needed for anxiety    LOSARTAN (COZAAR) 25 MG TABLET    Take 1 tablet (25 mg total) by mouth daily    MONTELUKAST (SINGULAIR) 10 MG TABLET    Take by mouth    PREDNISONE 5 MG TABLET    20 mg PO daily x 1 day, then  "15 mg PO daily x 1 day, then 10mg PO daily x 1 days then 5 mg PO daily   Modified Medications    No medications on file   Discontinued Medications    No medications on file       Allergies[1]     Ht 5' 10\" (1.778 m)   Wt 83 kg (183 lb)   BMI 26.26 kg/m²      REVIEW OF SYSTEMS:  Constitutional: Negative.    HEENT: Negative.    Respiratory: Negative.    Skin: Negative.    Neurological: Negative.    Psychiatric/Behavioral: Negative.  Musculoskeletal: Negative except for that mentioned in the HPI.    Gen: No acute distress, resting comfortably in bed  HEENT: Eyes clear, moist mucus membranes, hearing intact  Respiratory: No audible wheezing or stridor  Cardiovascular: Well Perfused peripherally, 2+ distal pulse  Abdomen: nondistended, no peritoneal signs     PHYSICAL EXAM: Left elbow: Positive swelling.  Positive ecchymosis.  Pain with resisted elbow extension.  3-5 strength with resisted extension.  5-5 strength with flexion.  Full range of motion of the elbow.  Neurovascular intact.  Positive deformity with palpable defect at the tricep footprint.    IMAGING: MRI left elbow: There is a full-thickness triceps tendon rupture with minimal retraction.           [1]   Allergies  Allergen Reactions    Naproxen GI Intolerance    Pollen Extract Sneezing     Other reaction(s): Unknown     "

## 2025-05-14 NOTE — PROGRESS NOTES
Name: Moy Dickerson      : 1960      MRN: [id number]  Encounter Provider: Jarad Rizzo MD  Encounter Date: 2025   Encounter department: [facility]  :  Assessment & Plan  Essential hypertension    Orders:  •  losartan (COZAAR) 50 mg tablet; Take 1 tablet (50 mg total) by mouth daily    Chronic obstructive pulmonary disease, unspecified COPD type (HCC)    Orders:  •  fluticasone-umeclidinium-vilanterol (Trelegy Ellipta) 200-62.5-25 mcg/actuation AEPB inhaler; Inhale 1 puff daily Rinse mouth after use.           History of Present Illness   64-year-old male here today for preop clearance for surgery for his left triceps tendon tear.  Patient history of pulmonary embolus and will be holding his Eliquis 2 days prior to the surgery.  Patient is taking a baby aspirin which he will continue and he will resume his medication after his to his surgery.  Patient's blood pressure is little high today working to see about increasing his Cozaar to 50 mg daily.  Discussed with patient at length nature of injury is going to entail a pretty prolonged rehabilitative process and he is aware of that.  Patient is medically cleared for his surgery he has no renal problems and does not have any problems with gastrointestinal bleeding.  Patient also was getting Advair and an would like to see about changing over to the Trelegy he was not able to get the Breztri at his last visit with me will see about trying to see if he gets Trelegy covered with his insurance otherwise we will check at his next visit after postop.      Review of Systems   Constitutional:  Negative for activity change, appetite change, chills, fatigue, fever and unexpected weight change.   HENT:  Negative for congestion, ear pain, hearing loss, mouth sores, postnasal drip, sinus pressure, sinus pain, sneezing and sore throat.    Respiratory:  Negative for apnea, cough, shortness of breath and wheezing.    Cardiovascular:   "Negative for chest pain, palpitations and leg swelling.   Gastrointestinal:  Negative for abdominal pain, constipation, diarrhea, nausea and vomiting.   Endocrine: Negative for cold intolerance and heat intolerance.   Genitourinary:  Negative for dysuria, frequency and hematuria.   Musculoskeletal:  Negative for arthralgias, back pain, gait problem, joint swelling and neck pain.   Skin:  Negative for rash.   Neurological:  Negative for dizziness, weakness and numbness.   Hematological:  Does not bruise/bleed easily.   Psychiatric/Behavioral:  Negative for agitation, behavioral problems, confusion, hallucinations and sleep disturbance. The patient is not nervous/anxious.        Objective   /94 (BP Location: Left arm, Patient Position: Sitting, Cuff Size: Standard)   Pulse 78   Temp 98.4 °F (36.9 °C)   Resp 18   Ht 5' 10\" (1.778 m)   Wt 82.8 kg (182 lb 8 oz)   SpO2 98%   BMI 26.19 kg/m²      Physical Exam  Constitutional:       Appearance: He is well-developed.   HENT:      Head: Normocephalic and atraumatic.      Right Ear: External ear normal.      Left Ear: External ear normal.      Nose: Nose normal.      Mouth/Throat:      Pharynx: Oropharynx is clear. No oropharyngeal exudate.     Eyes:      General: No scleral icterus.     Conjunctiva/sclera: Conjunctivae normal.      Pupils: Pupils are equal, round, and reactive to light.       Cardiovascular:      Rate and Rhythm: Normal rate and regular rhythm.      Heart sounds: Normal heart sounds.   Pulmonary:      Effort: Pulmonary effort is normal.      Breath sounds: Normal breath sounds. No wheezing or rales.   Abdominal:      General: Bowel sounds are normal.      Palpations: Abdomen is soft.      Tenderness: There is no abdominal tenderness. There is no guarding.     Musculoskeletal:         General: Tenderness present. Normal range of motion.      Cervical back: Normal range of motion and neck supple.     Skin:     General: Skin is warm and dry.      " Findings: No erythema or rash.     Neurological:      Mental Status: He is alert and oriented to person, place, and time. Mental status is at baseline.     Psychiatric:         Mood and Affect: Mood normal.         Behavior: Behavior normal.         Thought Content: Thought content normal.         Judgment: Judgment normal.

## 2025-05-14 NOTE — ASSESSMENT & PLAN NOTE
Patient requires tricep tendon repair given significant MRI findings and clinical exam findings.The patient understands the risks and benefits of the procedure with risks including pain, stiffness, infection, neurovascular injury, recurrence of symptoms, failure of surgical procedure, inadvertent intraoperative complications, blood loss, blood clots, allergic reaction to anesthesia, stroke, heart attack, all up to and including to death. The patient understood and did consent for surgery today.

## 2025-05-15 ENCOUNTER — ANESTHESIA EVENT (OUTPATIENT)
Dept: PERIOP | Facility: HOSPITAL | Age: 65
End: 2025-05-15
Payer: COMMERCIAL

## 2025-05-15 DIAGNOSIS — Z91.89 AT RISK FOR SLEEP APNEA: Primary | ICD-10-CM

## 2025-05-15 NOTE — PRE-PROCEDURE INSTRUCTIONS
Pre-Surgery Instructions:   Medication Instructions    Anoro Ellipta 62.5-25 MCG/ACT inhaler Take day of surgery.    apixaban (Eliquis) 5 mg Pt has received instructions to hold for 2 days before surgery    aspirin 81 MG tablet Take day of surgery.    Budeson-Glycopyrrol-Formoterol 160-9-4.8 MCG/ACT AERO Take day of surgery.    carvedilol (COREG) 3.125 mg tablet Take day of surgery.    losartan (COZAAR) 50 mg tablet Hold day of surgery.    montelukast (SINGULAIR) 10 mg tablet Take night before surgery    Medication instructions for day of surgery reviewed. Please take all instructed medications with only a sip of water.       You will receive a call one business day prior to surgery with an arrival time and hospital directions. If your surgery is scheduled on a Monday, the hospital will be calling you on the Friday prior to your surgery. If you have not heard from anyone by 8pm, please call the hospital supervisor through the hospital  at [phone]. ([place] [phone] or [place] [phone]).    Do not eat or drink anything after midnight the night before your surgery, including candy, mints, lifesavers, or chewing gum. Do not drink alcohol 24hrs before your surgery. Try not to smoke at least 24hrs before your surgery.       Follow the pre surgery showering instructions as listed in the “My Surgical Experience Booklet” or otherwise provided by your surgeon's office. Do not use a blade to shave the surgical area 1 week before surgery. It is okay to use a clean electric clippers up to 24 hours before surgery. Do not apply any lotions, creams, including makeup, cologne, deodorant, or perfumes after showering on the day of your surgery. Do not use dry shampoo, hair spray, hair gel, or any type of hair products.     No contact lenses, eye make-up, or artificial eyelashes. Remove nail polish, including gel polish, and any artificial, gel, or acrylic nails if possible. Remove all jewelry including rings  and body piercing jewelry.     Wear causal clothing that is easy to take on and off. Consider your type of surgery.    Keep any valuables, jewelry, piercings at home. Please bring any specially ordered equipment (sling, braces) if indicated.    Arrange for a responsible person to drive you to and from the hospital on the day of your surgery. Please confirm the visitor policy for the day of your procedure when you receive your phone call with an arrival time.     Call the surgeon's office with any new illnesses, exposures, or additional questions prior to surgery.    Please reference your “My Surgical Experience Booklet” for additional information to prepare for your upcoming surgery.

## 2025-05-19 ENCOUNTER — PATIENT OUTREACH (OUTPATIENT)
Dept: PULMONOLOGY | Facility: CLINIC | Age: 65
End: 2025-05-19

## 2025-05-19 NOTE — PROGRESS NOTES
Patient called back to schedule smoking cessation visit.     Patient did not want to set date yet but will call back when ready.

## 2025-05-20 ENCOUNTER — ANESTHESIA (OUTPATIENT)
Dept: PERIOP | Facility: HOSPITAL | Age: 65
End: 2025-05-20
Payer: COMMERCIAL

## 2025-05-30 ENCOUNTER — HOSPITAL ENCOUNTER (OUTPATIENT)
Facility: HOSPITAL | Age: 65
Setting detail: OUTPATIENT SURGERY
Discharge: HOME | End: 2025-05-30
Attending: ORTHOPAEDIC SURGERY | Admitting: ORTHOPAEDIC SURGERY
Payer: COMMERCIAL

## 2025-05-30 VITALS
BODY MASS INDEX: 25.01 KG/M2 | TEMPERATURE: 97.3 F | HEART RATE: 77 BPM | RESPIRATION RATE: 15 BRPM | WEIGHT: 174.7 LBS | SYSTOLIC BLOOD PRESSURE: 177 MMHG | HEIGHT: 70 IN | DIASTOLIC BLOOD PRESSURE: 95 MMHG | OXYGEN SATURATION: 95 %

## 2025-05-30 DIAGNOSIS — T65.291A TOXIC EFFECT OF TOBACCO AND NICOTINE, ACCIDENTAL OR UNINTENTIONAL, INITIAL ENCOUNTER: ICD-10-CM

## 2025-05-30 DIAGNOSIS — S46.319A: Primary | ICD-10-CM

## 2025-05-30 PROCEDURE — 24342 REPAIR OF RUPTURED TENDON: CPT | Performed by: PHYSICIAN ASSISTANT

## 2025-05-30 PROCEDURE — C1713 ANCHOR/SCREW BN/BN,TIS/BN: HCPCS | Performed by: ORTHOPAEDIC SURGERY

## 2025-05-30 PROCEDURE — 24342 REPAIR OF RUPTURED TENDON: CPT | Performed by: ORTHOPAEDIC SURGERY

## 2025-05-30 DEVICE — IMPLSYS 2NDRY FIXATN BIOSWVLK 4.75X19.1
Type: IMPLANTABLE DEVICE | Site: ARM | Status: FUNCTIONAL
Brand: ARTHREX®

## 2025-05-30 RX ORDER — DEXAMETHASONE SODIUM PHOSPHATE 10 MG/ML
INJECTION, SOLUTION INTRAMUSCULAR; INTRAVENOUS AS NEEDED
Status: DISCONTINUED | OUTPATIENT
Start: 2025-05-30 | End: 2025-05-30

## 2025-05-30 RX ORDER — SODIUM CHLORIDE, SODIUM LACTATE, POTASSIUM CHLORIDE, CALCIUM CHLORIDE 600; 310; 30; 20 MG/100ML; MG/100ML; MG/100ML; MG/100ML
20 INJECTION, SOLUTION INTRAVENOUS CONTINUOUS
Status: DISCONTINUED | OUTPATIENT
Start: 2025-05-30 | End: 2025-05-30 | Stop reason: HOSPADM

## 2025-05-30 RX ORDER — VANCOMYCIN HYDROCHLORIDE 1 G/20ML
INJECTION, POWDER, LYOPHILIZED, FOR SOLUTION INTRAVENOUS AS NEEDED
Status: DISCONTINUED | OUTPATIENT
Start: 2025-05-30 | End: 2025-05-30 | Stop reason: HOSPADM

## 2025-05-30 RX ORDER — SODIUM CHLORIDE, SODIUM LACTATE, POTASSIUM CHLORIDE, CALCIUM CHLORIDE 600; 310; 30; 20 MG/100ML; MG/100ML; MG/100ML; MG/100ML
INJECTION, SOLUTION INTRAVENOUS CONTINUOUS PRN
Status: DISCONTINUED | OUTPATIENT
Start: 2025-05-30 | End: 2025-05-30

## 2025-05-30 RX ORDER — FENTANYL CITRATE 50 UG/ML
INJECTION, SOLUTION INTRAMUSCULAR; INTRAVENOUS AS NEEDED
Status: DISCONTINUED | OUTPATIENT
Start: 2025-05-30 | End: 2025-05-30

## 2025-05-30 RX ORDER — LABETALOL HYDROCHLORIDE 5 MG/ML
10 INJECTION, SOLUTION INTRAVENOUS ONCE
Status: COMPLETED | OUTPATIENT
Start: 2025-05-30 | End: 2025-05-30

## 2025-05-30 RX ORDER — CEPHALEXIN 500 MG/1
500 CAPSULE ORAL EVERY 6 HOURS SCHEDULED
Qty: 8 CAPSULE | Refills: 0 | Status: SHIPPED | OUTPATIENT
Start: 2025-05-30 | End: 2025-06-01

## 2025-05-30 RX ORDER — OXYCODONE HYDROCHLORIDE 5 MG/1
5 TABLET ORAL EVERY 4 HOURS PRN
Qty: 30 TABLET | Refills: 0 | Status: SHIPPED | OUTPATIENT
Start: 2025-05-30 | End: 2025-06-04

## 2025-05-30 RX ORDER — LIDOCAINE HYDROCHLORIDE 10 MG/ML
INJECTION, SOLUTION EPIDURAL; INFILTRATION; INTRACAUDAL; PERINEURAL AS NEEDED
Status: DISCONTINUED | OUTPATIENT
Start: 2025-05-30 | End: 2025-05-30

## 2025-05-30 RX ORDER — LABETALOL HYDROCHLORIDE 5 MG/ML
10 INJECTION, SOLUTION INTRAVENOUS ONCE
Status: DISCONTINUED | OUTPATIENT
Start: 2025-05-30 | End: 2025-05-30 | Stop reason: HOSPADM

## 2025-05-30 RX ORDER — CHLORHEXIDINE GLUCONATE ORAL RINSE 1.2 MG/ML
15 SOLUTION DENTAL ONCE
Status: COMPLETED | OUTPATIENT
Start: 2025-05-30 | End: 2025-05-30

## 2025-05-30 RX ORDER — PROPOFOL 10 MG/ML
INJECTION, EMULSION INTRAVENOUS AS NEEDED
Status: DISCONTINUED | OUTPATIENT
Start: 2025-05-30 | End: 2025-05-30

## 2025-05-30 RX ORDER — METOCLOPRAMIDE HYDROCHLORIDE 5 MG/ML
10 INJECTION INTRAMUSCULAR; INTRAVENOUS ONCE AS NEEDED
Status: DISCONTINUED | OUTPATIENT
Start: 2025-05-30 | End: 2025-05-30 | Stop reason: HOSPADM

## 2025-05-30 RX ORDER — HYDRALAZINE HYDROCHLORIDE 20 MG/ML
10 INJECTION INTRAMUSCULAR; INTRAVENOUS ONCE
Status: COMPLETED | OUTPATIENT
Start: 2025-05-30 | End: 2025-05-30

## 2025-05-30 RX ORDER — MAGNESIUM HYDROXIDE 1200 MG/15ML
LIQUID ORAL AS NEEDED
Status: DISCONTINUED | OUTPATIENT
Start: 2025-05-30 | End: 2025-05-30 | Stop reason: HOSPADM

## 2025-05-30 RX ORDER — ONDANSETRON 2 MG/ML
INJECTION INTRAMUSCULAR; INTRAVENOUS AS NEEDED
Status: DISCONTINUED | OUTPATIENT
Start: 2025-05-30 | End: 2025-05-30

## 2025-05-30 RX ORDER — TRANEXAMIC ACID 10 MG/ML
1000 INJECTION, SOLUTION INTRAVENOUS ONCE
Status: COMPLETED | OUTPATIENT
Start: 2025-05-30 | End: 2025-05-30

## 2025-05-30 RX ORDER — CHLORHEXIDINE GLUCONATE 40 MG/ML
SOLUTION TOPICAL DAILY PRN
Status: DISCONTINUED | OUTPATIENT
Start: 2025-05-30 | End: 2025-05-30 | Stop reason: HOSPADM

## 2025-05-30 RX ORDER — FENTANYL CITRATE/PF 50 MCG/ML
50 SYRINGE (ML) INJECTION
Status: DISCONTINUED | OUTPATIENT
Start: 2025-05-30 | End: 2025-05-30 | Stop reason: HOSPADM

## 2025-05-30 RX ORDER — CEFAZOLIN SODIUM 2 G/50ML
2000 SOLUTION INTRAVENOUS ONCE
Status: COMPLETED | OUTPATIENT
Start: 2025-05-30 | End: 2025-05-30

## 2025-05-30 RX ORDER — MIDAZOLAM HYDROCHLORIDE 2 MG/2ML
INJECTION, SOLUTION INTRAMUSCULAR; INTRAVENOUS AS NEEDED
Status: DISCONTINUED | OUTPATIENT
Start: 2025-05-30 | End: 2025-05-30

## 2025-05-30 RX ORDER — BUPIVACAINE HYDROCHLORIDE 2.5 MG/ML
INJECTION, SOLUTION EPIDURAL; INFILTRATION; INTRACAUDAL; PERINEURAL
Status: COMPLETED | OUTPATIENT
Start: 2025-05-30 | End: 2025-05-30

## 2025-05-30 RX ORDER — DIPHENHYDRAMINE HYDROCHLORIDE 50 MG/ML
12.5 INJECTION, SOLUTION INTRAMUSCULAR; INTRAVENOUS ONCE AS NEEDED
Status: DISCONTINUED | OUTPATIENT
Start: 2025-05-30 | End: 2025-05-30 | Stop reason: HOSPADM

## 2025-05-30 RX ADMIN — ONDANSETRON 4 MG: 2 INJECTION INTRAMUSCULAR; INTRAVENOUS at 13:03

## 2025-05-30 RX ADMIN — FENTANYL CITRATE 50 MCG: 50 INJECTION, SOLUTION INTRAMUSCULAR; INTRAVENOUS at 14:12

## 2025-05-30 RX ADMIN — FENTANYL CITRATE 50 MCG: 50 INJECTION, SOLUTION INTRAMUSCULAR; INTRAVENOUS at 14:17

## 2025-05-30 RX ADMIN — FENTANYL CITRATE 100 MCG: 50 INJECTION, SOLUTION INTRAMUSCULAR; INTRAVENOUS at 12:46

## 2025-05-30 RX ADMIN — SODIUM CHLORIDE, SODIUM LACTATE, POTASSIUM CHLORIDE, AND CALCIUM CHLORIDE: .6; .31; .03; .02 INJECTION, SOLUTION INTRAVENOUS at 12:55

## 2025-05-30 RX ADMIN — DEXAMETHASONE SODIUM PHOSPHATE 10 MG: 10 INJECTION, SOLUTION INTRAMUSCULAR; INTRAVENOUS at 13:12

## 2025-05-30 RX ADMIN — CEFAZOLIN SODIUM 2000 MG: 2 SOLUTION INTRAVENOUS at 13:06

## 2025-05-30 RX ADMIN — LABETALOL HYDROCHLORIDE 10 MG: 5 INJECTION, SOLUTION INTRAVENOUS at 14:56

## 2025-05-30 RX ADMIN — BUPIVACAINE HYDROCHLORIDE 25 ML: 2.5 INJECTION, SOLUTION EPIDURAL; INFILTRATION; INTRACAUDAL at 12:50

## 2025-05-30 RX ADMIN — PROPOFOL 200 MG: 10 INJECTION, EMULSION INTRAVENOUS at 12:59

## 2025-05-30 RX ADMIN — LIDOCAINE HYDROCHLORIDE 50 MG: 10 INJECTION, SOLUTION EPIDURAL; INFILTRATION; INTRACAUDAL at 12:59

## 2025-05-30 RX ADMIN — CHLORHEXIDINE GLUCONATE 0.12% ORAL RINSE 15 ML: 1.2 LIQUID ORAL at 12:38

## 2025-05-30 RX ADMIN — HYDRALAZINE HYDROCHLORIDE 10 MG: 20 INJECTION INTRAMUSCULAR; INTRAVENOUS at 15:16

## 2025-05-30 RX ADMIN — TRANEXAMIC ACID 1000 MG: 10 INJECTION, SOLUTION INTRAVENOUS at 13:08

## 2025-05-30 RX ADMIN — MIDAZOLAM HYDROCHLORIDE 2 MG: 1 INJECTION, SOLUTION INTRAMUSCULAR; INTRAVENOUS at 12:46

## 2025-05-30 NOTE — DISCHARGE INSTR - AVS FIRST PAGE
Geovanny Landon DO    Orthopedic Surgery, Shoulder/Elbow and Sports Medicine  [address], [place] [address]  Phone: [phone]    General Post-op Surgical Instructions:    Date of Procedure - 05/30/25     Procedure - Left triceps repair    Weight Bearing Status - nonweightbearing left arm in the sling/splint    DVT Prophylaxis and Duration - not required    PT/OT Instructions - to be discussed at first post-op    Stitches/Staples - Will be removed at 7-10 days postop; we will then place steristrips on incision site    Wound Care - maintain dressing and splint, keep clean and dry    Xray follow up - to be done at or prior to office visit follow-up if indicated    Additional Info - Please complete your course of antibiotics     Any questions or concerns please call [phone] please!

## 2025-05-30 NOTE — INTERVAL H&P NOTE
H&P reviewed. After examining the patient I find no changes in the patients condition since the H&P had been written.    There were no vitals filed for this visit.    Plan for left triceps repair today

## 2025-05-30 NOTE — ANESTHESIA PROCEDURE NOTES
Peripheral Block    Patient location during procedure: holding area  Start time: 5/30/2025 12:50 PM  Reason for block: at surgeon's request and post-op pain management  Staffing  Performed by: True Dickey MD  Authorized by: True Dickey MD    Preanesthetic Checklist  Completed: patient identified, IV checked, site marked, risks and benefits discussed, surgical consent, monitors and equipment checked, pre-op evaluation and timeout performed  Peripheral Block  Patient position: supine  Prep: ChloraPrep  Patient monitoring: heart rate, continuous pulse ox and frequent blood pressure checks  Block type: Supraclavicular  Laterality: left  Injection technique: single-shot  Procedures: ultrasound guided, Ultrasound guidance required for the procedure to increase accuracy and safety of medication placement and decrease risk of complications.  Ultrasound permanent image saved  bupivacaine (PF) (MARCAINE) 0.25 % injection 20 mL - Perineural   25 mL - 5/30/2025 12:50:00 PM  Needle  Needle type: Stimuplex   Needle gauge: 22 G  Needle length: 4 in  Needle localization: ultrasound guidance  Assessment  Injection assessment: incremental injection, local visualized surrounding nerve on ultrasound, negative aspiration for heme, no paresthesia on injection, frequent aspiration, needle tip visualized at all times, negative aspiration, no symptoms of intraneural/intravenous injection, negative for heart rate change and injected with ease  Paresthesia pain: none  Post-procedure:  site cleaned  patient tolerated the procedure well with no immediate complications

## 2025-05-30 NOTE — OP NOTE
OPERATIVE REPORT  PATIENT NAME: Moy Dickerson    :  1960  MRN: [id number]  Pt Location: [facility] OR ROOM 01    SURGERY DATE: 2025    Surgeons and Role:     * Geovanny Landon DO - Primary     * Paulino Davalos PA-C - Assisting    Preop Diagnosis:  Triceps tendon rupture, unspecified laterality, initial encounter [S46.319A]    Post-Op Diagnosis Codes:     * Triceps tendon rupture, unspecified laterality, initial encounter [S46.319A]    Procedure(s):  Left - REPAIR TENDON. TRICEPS    Specimen(s):  * No specimens in log *    Estimated Blood Loss:   Minimal    Drains:  * No LDAs found *    Anesthesia Type:   General w/ Interscalene Block    Operative Indications:  Triceps tendon rupture, unspecified laterality, initial encounter [S46.783A]      Operative Findings:  Ruptured distal triceps       Complications:   None    Procedure and Technique:  The patient was seen in the preoperative holding area where the operative extremity was marked.  He was taken to the operating room and placed in the lateral decubitus position.  His left upper extremity was prepped and draped in usual sterile fashion.  A timeout was called and the patient was ministered Ancef 2 g IV prior to incision.  Using a 15 blade knife a curvilinear incision was made over the olecranon process.  Subcutaneous dissection was taken down with Metzenbaum scissors and Bovie cautery.  The triceps tendon was then identified.  It was found to be ruptured off its footprint.   The triceps tendon was then whipstitched in a [place] fashion using two #2 FiberWire sutures in a fashion to allow a box and X configuration repair.  2 suture link sutures were also passed through the tendon for shuttling of the sutures to create this configuration.  The footprint was debrided of any soft tissue down to bleeding bone using combination of key elevator and a rongeur and a curette.  2 transosseous bone tunnels starting from the footprint and exiting at the dorsal aspect of the  ulna was performed.  The sutures from the medial and lateral sides of the tendon were then shuttled through the transosseous tunnels using a Easel Learn suture passer.  The suture link sutures were then used to shuttle the sutures around the tendon to create a box and X configuration and with tensioning of the sutures matted down and fixated the tendon to its footprint.  The elbow was placed through a range of motion to eliminate creep.  The suture ends were then fixated to the bone using a 4.75 swivel lock anchor just distal to the bone tunnels on the ulna.  The elbow was placed through range of motion and showed adequate fixation of the tendon to its footprint with no signs of gapping.  The elbow was able to be flexed up to 90 degrees without any signs of gapping of the tendon.  The wound was copiously irrigated.  The peritenon was closed over the triceps tendon using 0 Vicryl.  The deep tissue distally on the ulnar was closed over the anchor using 0 Vicryl.  The skin was closed with 2-0 Monocryl and 3-0 STRATAFIX.  Exofin was placed.  Mepilex dressing was placed.  The patient was placed in a long posterior splint with the elbow flexed at approximately 30 degrees of flexion.  The patient tolerated the procedure well.  There were no complications throughout the case.  The patient was placed in PACU in stable condition.    I was present for the entire procedure., A qualified resident physician was not available., and A physician assistant was required during the procedure for retraction, tissue handling, dissection and suturing.    Patient Disposition:  PACU          SIGNATURE: Geovanny Landon DO  DATE: May 30, 2025  TIME: 2:06 PM

## 2025-05-30 NOTE — ANESTHESIA POSTPROCEDURE EVALUATION
Post-Op Assessment Note    CV Status:  Stable  Pain Score: 0    Pain management: adequate       Mental Status:  Alert and awake   Hydration Status:  Euvolemic   PONV Controlled:  Controlled   Airway Patency:  Patent     Post Op Vitals Reviewed: Yes    No anethesia notable event occurred.    Staff: Anesthesiologist, CRNA           Last Filed PACU Vitals:  Vitals Value Taken Time   Temp 97.1 °F (36.2 °C) 05/30/25 14:36   Pulse 91 05/30/25 14:36   /92 05/30/25 14:36   Resp 12 05/30/25 14:36   SpO2 96 % 05/30/25 14:36

## 2025-05-30 NOTE — ANESTHESIA PREPROCEDURE EVALUATION
Procedure:  REPAIR TENDON, TRICEPS (Left: Arm Upper)    Relevant Problems   CARDIO   (+) Hypertension, essential   (+) Pulmonary emboli (HCC)      PULMONARY   (+) Asthma   (+) COPD (chronic obstructive pulmonary disease) (HCC)   (+) Dyspnea        Physical Exam    Airway     Mallampati score: II  TM Distance: >3 FB  Neck ROM: full  Mouth opening: >= 4 cm      Cardiovascular  Cardiovascular exam normal    Dental   No notable dental hx     Pulmonary  Pulmonary exam normal     Neurological  - normal exam    Other Findings  Predisone 5mg PRN for COPD  Says COPD is variable  --> 145 after sedation for blockPredisone 5mg PRN for COPD  Says COPD is variable  --> 145 after sedation for block        Anesthesia Plan  ASA Score- 3     Anesthesia Type- general with ASA Monitors.         Additional Monitors:     Airway Plan: LMA and LMA.           Plan Factors-Exercise tolerance (METS): >4 METS.    Chart reviewed. EKG reviewed.  Existing labs reviewed. Patient summary reviewed.    Patient is a current smoker.  Patient did not smoke on day of surgery.            Induction- intravenous.    Postoperative Plan- Plan for postoperative opioid use.   Monitoring Plan - Monitoring plan - standard ASA monitoring  Post Operative Pain Plan - non-opiod analgesics, plan for postoperative opioid use and peripheral nerve block        Informed Consent- Anesthetic plan and risks discussed with patient.  I personally reviewed this patient with the CRNA. Discussed and agreed on the Anesthesia Plan with the CRNA..      NPO Status:  Vitals Value Taken Time   Date of last liquid 05/30/25 05/30/25 12:22   Time of last liquid 0900 05/30/25 12:22   Date of last solid 05/29/25 05/30/25 12:22   Time of last solid 1730 05/30/25 12:22

## 2025-06-05 ENCOUNTER — OFFICE VISIT (OUTPATIENT)
Dept: OBGYN CLINIC | Facility: CLINIC | Age: 65
End: 2025-06-05

## 2025-06-05 VITALS — BODY MASS INDEX: 25.28 KG/M2 | HEIGHT: 70 IN | WEIGHT: 176.6 LBS

## 2025-06-05 DIAGNOSIS — Z09 POSTOP CHECK: Primary | ICD-10-CM

## 2025-06-05 PROCEDURE — 99024 POSTOP FOLLOW-UP VISIT: CPT | Performed by: ORTHOPAEDIC SURGERY

## 2025-06-05 NOTE — ASSESSMENT & PLAN NOTE
Patient is status post left triceps tendon repair, date of surgery 5/30/2025  Occupational therapy order was placed with please follow Dr. Landon's triceps tendon repair protocol   Provided patient with hinged elbow brace. He is aware he will be in the brace at all times except when showering, getting dressed and doing his exercises   Patient may start to shower but do not submerge incision site in water or scrub the incision site  Continue taking Tylenol as needed for pain relief  Patient is to follow-up in 5 weeks for reevaluation

## 2025-06-05 NOTE — PROGRESS NOTES
[facility] [address] [id number] [phone]       Moy Dickerson  [id number]  1960    ORTHOPAEDIC SURGERY OUTPATIENT NOTE  6/5/2025    :  Assessment & Plan  Postop check  Patient is status post left triceps tendon repair, date of surgery 5/30/2025  Occupational therapy order was placed with please follow Dr. Landon's triceps tendon repair protocol   Provided patient with hinged elbow brace. He is aware he will be in the brace at all times except when showering, getting dressed and doing his exercises   Patient may start to shower but do not submerge incision site in water or scrub the incision site  Continue taking Tylenol as needed for pain relief  Patient is to follow-up in 5 weeks for reevaluation                HISTORY:  64 y.o. male presents today status post left triceps tendon repair, date of surgery 5/30/2025.  Patient is doing well.  He states his pain is controlled.  Patient has finished taking oxycodone and is on Tylenol which has helped with his pain relief.  Denies any numbness or tingling.  Patient states his goal is to return to playing golf in September.    Past Medical History[1]    Past Surgical History[2]    Social History     Socioeconomic History    Marital status: /Civil Union     Spouse name: Not on file    Number of children: Not on file    Years of education: Not on file    Highest education level: Not on file   Occupational History    Not on file   Tobacco Use    Smoking status: Every Day     Current packs/day: 0.50     Types: Cigarettes     Passive exposure: Current    Smokeless tobacco: Never   Vaping Use    Vaping status: Never Used   Substance and Sexual Activity    Alcohol use: No    Drug use: No    Sexual activity: Not on file   Other Topics Concern    Not on file   Social History Narrative    Not on file     Social Drivers of Health     Financial Resource Strain: Not on file    Food Insecurity: Not on file   Transportation Needs: Not on file   Physical Activity: Not on file   Stress: Not on file   Social Connections: Not on file   Intimate Partner Violence: Not on file   Housing Stability: Not on file       Family History[3]     Patient's Medications   New Prescriptions    No medications on file   Previous Medications    ALBUTEROL (2.5 MG/3 ML) 0.083 % NEBULIZER SOLUTION    Take 3 mL (2.5 mg total) by nebulization every 6 (six) hours as needed for wheezing or shortness of breath    ANORO ELLIPTA 62.5-25 MCG/ACT INHALER    Inhale 1 puff daily    APIXABAN (ELIQUIS) 5 MG    TAKE ONE TABLET BY MOUTH TWICE A DAY    ASPIRIN 81 MG TABLET    Take 1 tablet by mouth in the morning.    BUDESON-GLYCOPYRROL-FORMOTEROL 160-9-4.8 MCG/ACT AERO    Inhale 2 puffs 2 (two) times a day Rinse mouth after use.    CARVEDILOL (COREG) 3.125 MG TABLET    TAKE ONE TABLET BY MOUTH TWICE A DAY    FLUTICASONE-UMECLIDINIUM-VILANTEROL (TRELEGY ELLIPTA) 200-62.5-25 MCG/ACTUATION AEPB INHALER    Inhale 1 puff daily Rinse mouth after use.    LORAZEPAM (ATIVAN) 0.5 MG TABLET    Take 1 tablet (0.5 mg total) by mouth daily at bedtime as needed for anxiety    LOSARTAN (COZAAR) 50 MG TABLET    Take 1 tablet (50 mg total) by mouth daily    MONTELUKAST (SINGULAIR) 10 MG TABLET    Take 10 mg by mouth daily at bedtime    PREDNISONE 5 MG TABLET    20 mg PO daily x 1 day, then 15 mg PO daily x 1 day, then 10mg PO daily x 1 days then 5 mg PO daily   Modified Medications    No medications on file   Discontinued Medications    No medications on file       Allergies[4]     There were no vitals taken for this visit.     REVIEW OF SYSTEMS:  Constitutional: Negative.    HEENT: Negative.    Respiratory: Negative.    Skin: Negative.    Neurological: Negative.    Psychiatric/Behavioral: Negative.  Musculoskeletal: Negative except for that mentioned in the HPI.    Gen: No acute distress, resting comfortably in bed  HEENT: Eyes clear, moist  mucus membranes, hearing intact  Respiratory: No audible wheezing or stridor  Cardiovascular: Well Perfused peripherally, 2+ distal pulse  Abdomen: nondistended, no peritoneal signs     PHYSICAL EXAM:    Left elbow  Surgical incision healing well, clean dry and intact, no sign of drainage or infection  Neurovascularly intact    IMAGING:    Not taken today     Scribe Attestation      I,:  Ayo Sandoval MA am acting as a scribe while in the presence of the attending physician.:       I,:  Geovanny Landon DO personally performed the services described in this documentation    as scribed in my presence.:                       [1]   Past Medical History:  Diagnosis Date    Allergic     Asthma     Cardiomyopathy (HCC)     COPD (chronic obstructive pulmonary disease) (HCC)     Hypertension     Pneumonia     x 3   [2]   Past Surgical History:  Procedure Laterality Date    CARDIAC CATHETERIZATION      x 2    WY RINSJ RPTD BICEPS/TRICEPS TDN DSTL W/WO TDN GRF Left 5/30/2025    Procedure: REPAIR TENDON, TRICEPS;  Surgeon: Geovanny Landon DO;  Location:  [facility] OR;  Service: Orthopedics   [3]   Family History  Problem Relation Name Age of Onset    Deep vein thrombosis Father Amilcar Dickerson    [4]   Allergies  Allergen Reactions    Naproxen GI Intolerance    Pollen Extract Sneezing     Other reaction(s): Unknown

## 2025-06-05 NOTE — PATIENT INSTRUCTIONS
Triceps Repair Rehabilitation Protocol    Maximum Protection Phase (Day 1 to Week 8)     Weeks 0-2    Brace: 0 to 60 degrees flexion    ROM: o Wrist and hand ROM   o Gripping exercises   o Shoulder pendulum in elbow brace   o Perform PROM shoulder exercises       Strengthening exercises:   o Gripping for hand   o Wrist flexion and extension, light dumbbell      Cryotherapy applied to triceps     Weeks 3-4    ROM brace applied: 0 to 90 degrees of flexion    No active elbow extension x4-6 weeks    Continue shoulder PROM exercises    Light isometric biceps at 60 degrees flexion    Initiate ER/IR tubing at 0 degrees adduction    Manual scapular neuromuscular exercises (seated)    Continue shoulder, elbow, wrist PROM    Continue with ice and compression     Weeks 5-6    ROM brace: increase ROM to 0 to full flexion at 6 to 7 weeks gradually    Initiate light shoulder and scapular strengthening exercises at 6 weeks   Discontinue elbow brace after 6 weeks    Weeks 6-8    ROM full   Initiate light isotonic strengthening for shoulder and scapula, and elbow   Continue ice prn     Weeks 8-12    Progress strengthening exercises for elbow and shoulder

## 2025-06-10 ENCOUNTER — EVALUATION (OUTPATIENT)
Dept: OCCUPATIONAL THERAPY | Facility: CLINIC | Age: 65
End: 2025-06-10
Attending: ORTHOPAEDIC SURGERY
Payer: COMMERCIAL

## 2025-06-10 DIAGNOSIS — S46.312A RUPTURE OF LEFT TRICEPS TENDON, INITIAL ENCOUNTER: Primary | ICD-10-CM

## 2025-06-10 PROCEDURE — 97165 OT EVAL LOW COMPLEX 30 MIN: CPT

## 2025-06-10 PROCEDURE — 97110 THERAPEUTIC EXERCISES: CPT

## 2025-06-10 NOTE — PROGRESS NOTES
OT Evaluation     Today's date: 6/10/2025  Patient name: Moy Dickerson  : 1960  MRN: [id number]  Referring provider: Geovanny Landon DO  Dx:   Encounter Diagnosis     ICD-10-CM    1. Rupture of left triceps tendon, initial encounter  S46.312A           Start Time: 1000  Stop Time: 1040  Total time in clinic (min): 40 minutes    Assessment  Impairments: abnormal coordination, abnormal or restricted ROM, abnormal movement, activity intolerance, impaired physical strength, lacks appropriate home exercise program, pain with function, weight-bearing intolerance, unable to perform ADL, activity limitations and endurance  Functional limitations: unable to perform ADLs and IADLs due to weakness, stiffness, current restrictions set by MD  Symptom irritability: high    Assessment details: Moy Dickerson is a 64 y.o., Right HD male referred to hand therapy for s/p L triceps repair.  Onset of injury 25 due to pushing a mattress upstairs. Patient experienced a popping sensation followed by swelling and bruising around his elbow. Patient presents 06/10/25 with impaired ROM, strength, and coordination of the LUE.  Deficits also noted in pain, edema and functional use of the left UE. Patient has an incision/wound on the posterior elbow that is healing well with no signs or symptoms of infection. Patient was provided with an initial HEP focused scapular retraction, shoulder PROM exercises, wrist and FA AROM. He was provided with a compression sleeve to prevent skin irritation from the brace and to control swelling. Patient is a good candidate for OT services to decrease pain and edema and restore ROM, strength, coordination, and function for a return to independence in daily tasks.   Understanding of Dx/Px/POC: excellent     Prognosis: good    Goals  STGs (4 weeks)  Patient will be independent in implementing HEP prescribed by therapist  Patient will demonstrate 10# improvement in  strength for improved use of L hand in  light ADLs and IADLs  Patient will adhere to wear schedule for IROM brace to protect repair  Promote proper scar healing to prevent scar adhesion and infection  Decrease edema by 50% at elbow as evident by circumferential measurements.  LTGs (12 weeks)  Patient will demonstrate independence in a HEP to maintain ROM, strength, and function at discharge  Patient will report an average pain level of 0-1/10 to be independent in daily tasks  Patient will demonstrate 15-20# improvement in  strength for improved ability for heavy household chores  Patient will demonstrate AROM of the elbow WFL to be independent in self care tasks  Patient will demonstrate 5/5 muscle strength in the elbow for a successful return to golfing  Patient will achieve goals as demonstrated by FOTO results  Patient will demonstrate resolution of edema     Plan  Patient would benefit from: skilled occupational therapy and home program  Planned modality interventions: thermotherapy: hydrocollator packs and cryotherapy    Planned therapy interventions: IASTM, kinesiology taping, joint mobilization, manual therapy, massage, neuromuscular re-education, strengthening, stretching, therapeutic activities, therapeutic exercise, home exercise program, graded exercise, graded activity, functional ROM exercises, flexibility, coordination, compression and patient/caregiver education    Frequency: 1x week  Duration in weeks: 12  Plan of Care beginning date: 6/10/2025  Plan of Care expiration date: 9/2/2025  Treatment plan discussed with: patient      Subjective Evaluation    History of Present Illness  Date of onset: 5/7/2025  Date of surgery: 5/30/2025  Mechanism of injury: Moy Dickerson is a 64 y.o. male referred to OT for a L triceps repair on 5/30/25. Patient states that the injury had occurred approximately on 5/7/25 when attempting to push a heavy mattress upstairs in his father's home.  He attempted to push the heavy item straight ahead with arm  extended and felt a pop in the posterior aspect of his elbow.  At that time, he had noticed increased bruising in the elbow and swelling along with weakness. MRI on 25 demonstrated complete rupture of triceps tendon, so patient underwent surgical intervention on 25.    Today, patient presents with stiffness in his elbow. He reports he just has a very small irritation but his pain is very well controlled.  Patient Goals  Patient goals for therapy: return to sport/leisure activities  Patient goal: Return to golf and back to lifting weights  Pain  Current pain ratin  At best pain ratin  Quality: stiffness.  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  Hand dominance: right      Diagnostic Tests  MRI studies: abnormal (25: triceps rupture)        Objective     Observations     Left Elbow   Positive for edema and incision.     Additional Observation Details  Incision healing well. Glue intact    Active Range of Motion     Left Wrist   Normal active range of motion    Additional Active Range of Motion Details  ROM as expected. Full flexion to 60 degrees in brace  Thumb and digit ROM WNLs    Strength/Myotome Testing     Left Wrist/Hand      (2nd hand position)     Trial 1: 46    Right Wrist/Hand      (2nd hand position)     Trial 1: 66             Precautions: L triceps repair  Weeks 0-2 (25)  · Brace: 0 to 60 degrees flexion   · ROM: Wrist and hand ROM   o Gripping exercises   o Shoulder pendulum in elbow brace   o Perform PROM shoulder exercises     · Strengthening exercises:   o Gripping for hand   o Wrist flexion and extension, light dumbbell     · Cryotherapy applied to triceps     Weeks 3-4 (25)  · ROM brace applied: 0 to 90 degrees of flexion   · No active elbow extension x4-6 weeks   · Continue shoulder PROM exercises   · Light isometric biceps at 60 degrees flexion   · Initiate ER/IR tubing at 0  "degrees adduction   · Manual scapular neuromuscular exercises (seated)   · Continue shoulder, elbow, wrist PROM   · Continue with ice and compression     Weeks 5-6 (7/11/25)  · ROM brace: increase ROM to 0 to full flexion at 6 to 7 weeks gradually   · Initiate light shoulder and scapular strengthening exercises at 6 weeks   Discontinue elbow brace after 6 weeks     Weeks 6-8 (7/25/25)  · ROM full   · Initiate light isotonic strengthening for shoulder and scapula, and elbow   · Continue ice prn     Weeks 8-12   · Progress strengthening exercises for elbow and shoulder   POC expires Unit limit Auth  expiration date PT/OT + Visit Limit?   9/2/25  Pending 30 pcy                 Visit/Unit Tracking  AUTH Status:  Date 6/10 IE              Auth req, 30 pcy. High copay Used 1               Remaining                     Manuals 6/10 IE        Scar/incision care         Edema control Size F TG                          Neuro Re-Ed         Prone I, Y, T         Prone row                                                      Ther Ex         Wrist AROM X20 ext/flex        FA AROM X20 p/s        Elbow AROM Active flexion to 60 degrees in brace        Table slides Flex 20 x 3\"        Scap retraction 20 x 3\"        Pendulums CW x20, CCW x20         strength         Finger spreads         Wrist PREs         Ther Activity                           HEP                           Modalities                                   "

## 2025-06-17 ENCOUNTER — OFFICE VISIT (OUTPATIENT)
Dept: OCCUPATIONAL THERAPY | Facility: CLINIC | Age: 65
End: 2025-06-17
Attending: ORTHOPAEDIC SURGERY
Payer: COMMERCIAL

## 2025-06-17 DIAGNOSIS — S46.312A RUPTURE OF LEFT TRICEPS TENDON, INITIAL ENCOUNTER: Primary | ICD-10-CM

## 2025-06-17 PROCEDURE — 97112 NEUROMUSCULAR REEDUCATION: CPT

## 2025-06-17 PROCEDURE — 97110 THERAPEUTIC EXERCISES: CPT

## 2025-06-17 NOTE — PROGRESS NOTES
Daily Note     Today's date: 2025  Patient name: Moy Dickerson  : 1960  MRN: [id number]  Referring provider: Geovanny Landon DO  Dx:   Encounter Diagnosis     ICD-10-CM    1. Rupture of left triceps tendon, initial encounter  S46.312A           Start Time: 1120  Stop Time: 1200  Total time in clinic (min): 40 minutes    Subjective: Patient reports he has been performing wrist PREs with 20# and  strengthening exercises for HEP. He reports compliance with brace when performing activity but has been taking it off while resting and watching TV.      Objective: See treatment diary below      Assessment: Tolerated treatment well. Patient exhibited good technique with therapeutic exercises and would benefit from continued OT. Instructed patient in performing shoulder and postural ROM exercises and  strengthening for HEP. He performed in clinic with controlled pain.       Plan: Continue per plan of care.      Precautions: L triceps repair  Weeks 0-2 (25)  · Brace: 0 to 60 degrees flexion   · ROM: Wrist and hand ROM   o Gripping exercises   o Shoulder pendulum in elbow brace   o Perform PROM shoulder exercises     · Strengthening exercises:   o Gripping for hand   o Wrist flexion and extension, light dumbbell     · Cryotherapy applied to triceps     Weeks 3-4 (25)  · ROM brace applied: 0 to 90 degrees of flexion   · No active elbow extension x4-6 weeks   · Continue shoulder PROM exercises   · Light isometric biceps at 60 degrees flexion   · Initiate ER/IR tubing at 0 degrees adduction   · Manual scapular neuromuscular exercises (seated)   · Continue shoulder, elbow, wrist PROM   · Continue with ice and compression     Weeks 5-6 (25)  · ROM brace: increase ROM to 0 to full flexion at 6 to 7 weeks gradually   · Initiate light shoulder and scapular strengthening exercises at 6 weeks   Discontinue elbow brace after 6 weeks     Weeks 6-8 (25)  · ROM full   · Initiate light isotonic  "strengthening for shoulder and scapula, and elbow   · Continue ice prn     Weeks 8-12   · Progress strengthening exercises for elbow and shoulder   POC expires Unit limit Auth  expiration date PT/OT + Visit Limit?   9/2/25  9 thru 12/6 30 pcy                 Visit/Unit Tracking  AUTH Status:  Date 6/10 IE 6/17             Auth req, 30 pcy. High copay Used 1 2              Remaining  8 7                  Manuals 6/10 IE 6/17       Scar/incision care         Edema control Size F TG                          Neuro Re-Ed         Prone I, Y, T  3x10 ea       Prone row  3x10       IR with TB         ER with TB                                    Ther Ex         Wrist AROM X20 ext/flex        FA AROM X20 p/s        Elbow AROM Active flexion to 60 degrees in brace        Table slides Flex 20 x 3\" Flex 3x10 x 3\"  Abd 3x10 x 3\"  Horz abd 3x10 x 3\"       Scap retraction 20 x 3\"        Pendulums CW x20, CCW x20 CW x30, CCW x30        strength  RTP x20       Finger spreads  OEW 3x10 ext and        Wrist PREs         FA PREs         Ther Activity                           HEP                           Modalities                                   "

## 2025-06-21 DIAGNOSIS — J98.01 ACUTE BRONCHOSPASM: ICD-10-CM

## 2025-06-23 ENCOUNTER — TELEPHONE (OUTPATIENT)
Age: 65
End: 2025-06-23

## 2025-06-23 RX ORDER — PREDNISONE 5 MG/1
TABLET ORAL
Qty: 60 TABLET | Refills: 0 | Status: SHIPPED | OUTPATIENT
Start: 2025-06-23

## 2025-06-23 NOTE — TELEPHONE ENCOUNTER
Patient placed a MyChart order for Prednisone.  He is calling to check the status of the order.  Please advise.

## 2025-06-24 ENCOUNTER — OFFICE VISIT (OUTPATIENT)
Dept: OCCUPATIONAL THERAPY | Facility: CLINIC | Age: 65
End: 2025-06-24
Attending: ORTHOPAEDIC SURGERY
Payer: COMMERCIAL

## 2025-06-24 DIAGNOSIS — S46.312A RUPTURE OF LEFT TRICEPS TENDON, INITIAL ENCOUNTER: Primary | ICD-10-CM

## 2025-06-24 PROCEDURE — 97112 NEUROMUSCULAR REEDUCATION: CPT

## 2025-06-24 PROCEDURE — 97110 THERAPEUTIC EXERCISES: CPT

## 2025-06-24 PROCEDURE — 97140 MANUAL THERAPY 1/> REGIONS: CPT

## 2025-06-24 NOTE — PROGRESS NOTES
Daily Note     Today's date: 2025  Patient name: Moy Dickerson  : 1960  MRN: [id number]  Referring provider: Geovanny Landon DO  Dx:   Encounter Diagnosis     ICD-10-CM    1. Rupture of left triceps tendon, initial encounter  S46.312A           Start Time: 1125  Stop Time: 1205  Total time in clinic (min): 40 minutes    Subjective: Patient reports he has been completing shoulder and postural exercises with weights for HEP. His pain has been well controlled but reports some stiffness.      Objective: See treatment diary below   (2nd hand position)     Trial 1: 51 (+5)      Assessment: Tolerated treatment well. Patient exhibited good technique with therapeutic exercises and would benefit from continued OT. Re-instructed patient in performing shoulder and postural exercises without weights for the next 2-3 weeks as his repair is still healing. Initiated isometric biceps strengthening at 60 degrees of elbow flexion and IR/ER with theraband today. He tolerated well and demonstrated fatigue with resistive ER. Instructed patient in scar massage to incision.      Plan: Continue per plan of care. Patient will not be seen next week as he will not be able to be progressed to next set of exercises     Precautions: L triceps repair  Weeks 0-2 (25)  · Brace: 0 to 60 degrees flexion   · ROM: Wrist and hand ROM   o Gripping exercises   o Shoulder pendulum in elbow brace   o Perform PROM shoulder exercises     · Strengthening exercises:   o Gripping for hand   o Wrist flexion and extension, light dumbbell     · Cryotherapy applied to triceps     Weeks 3-4 (25-25)  · ROM brace applied: 0 to 90 degrees of flexion   · No active elbow extension x4-6 weeks   · Continue shoulder PROM exercises   · Light isometric biceps at 60 degrees flexion   · Initiate ER/IR tubing at 0 degrees adduction   · Manual scapular neuromuscular exercises (seated)   · Continue shoulder, elbow, wrist PROM   · Continue with ice and  "compression     Weeks 5-6 (7/4/25-7/11/25)  · ROM brace: increase ROM to 0 to full flexion at 6 to 7 weeks gradually   · Initiate light shoulder and scapular strengthening exercises at 6 weeks   Discontinue elbow brace after 6 weeks     Weeks 6-8 (7/11/25-7/25/25)  · ROM full   · Initiate light isotonic strengthening for shoulder and scapula, and elbow   · Continue ice prn     Weeks 8-12   · Progress strengthening exercises for elbow and shoulder   POC expires Unit limit Auth  expiration date PT/OT + Visit Limit?   9/2/25  9 thru 12/6 30 pcy                 Visit/Unit Tracking  AUTH Status:  Date 6/10 IE 6/17             Auth req, 30 pcy. High copay Used 1 2              Remaining  8 7                  Manuals 6/10 IE 6/17 6/24      Scar/incision care   Scar massage 8'      Edema control Size F TG                          Neuro Re-Ed         Prone I, Y, T  3x10 ea 3x10 ea      Prone row  3x10 x30      IR with TB   GTB x30      ER with TB   GTB x30                                 Ther Ex         Wrist AROM X20 ext/flex        FA AROM X20 p/s        Elbow AROM Active flexion to 60 degrees in brace        Table slides Flex 20 x 3\" Flex 3x10 x 3\"  Abd 3x10 x 3\"  Horz abd 3x10 x 3\"       Scap retraction 20 x 3\"        Pendulums CW x20, CCW x20 CW x30, CCW x30        strength  RTP x20 HG 75# 3x10      Finger spreads  OEW 3x10 ext and  OEW ext and  x30      Wrist PREs         FA PREs   GFB 3x10 palm up, down      Ther Activity                           HEP                           Modalities                                     "

## 2025-07-01 ENCOUNTER — APPOINTMENT (OUTPATIENT)
Dept: OCCUPATIONAL THERAPY | Facility: CLINIC | Age: 65
End: 2025-07-01
Attending: ORTHOPAEDIC SURGERY
Payer: COMMERCIAL

## 2025-07-08 ENCOUNTER — OFFICE VISIT (OUTPATIENT)
Dept: OCCUPATIONAL THERAPY | Facility: CLINIC | Age: 65
End: 2025-07-08
Attending: ORTHOPAEDIC SURGERY
Payer: COMMERCIAL

## 2025-07-08 DIAGNOSIS — S46.312A RUPTURE OF LEFT TRICEPS TENDON, INITIAL ENCOUNTER: Primary | ICD-10-CM

## 2025-07-08 PROCEDURE — 97110 THERAPEUTIC EXERCISES: CPT

## 2025-07-08 PROCEDURE — 97112 NEUROMUSCULAR REEDUCATION: CPT

## 2025-07-08 PROCEDURE — 97140 MANUAL THERAPY 1/> REGIONS: CPT

## 2025-07-08 NOTE — PROGRESS NOTES
Daily Note     Today's date: 2025  Patient name: Moy Dickerson  : 1960  MRN: [id number]  Referring provider: Geovanny Landon DO  Dx:   Encounter Diagnosis     ICD-10-CM    1. Rupture of left triceps tendon, initial encounter  S46.312A           Start Time: 1125  Stop Time: 1205  Total time in clinic (min): 40 minutes    Subjective: Patient reports he has discontinued brace and has been completing strengthening exercises for shoulder and scapula with 4.5 lb weight.      Objective: See treatment diary below      Assessment: Tolerated treatment well. Patient exhibited good technique with therapeutic exercises and would benefit from continued OT. Patient overall doing well and has been completing HEP independently. Advised patient to continue to avoid forceful or resistive elbow flexion or extension. Initiated full arc ROM exercises to promote greater elbow flexion and to begin engaging triceps against gravity.      Plan: Continue per plan of care.      Precautions: L triceps repair DOS 25  Weeks 0-2 (25)  · Brace: 0 to 60 degrees flexion   · ROM: Wrist and hand ROM   o Gripping exercises   o Shoulder pendulum in elbow brace   o Perform PROM shoulder exercises     · Strengthening exercises:   o Gripping for hand   o Wrist flexion and extension, light dumbbell     · Cryotherapy applied to triceps     Weeks 3-4 (25-25)  · ROM brace applied: 0 to 90 degrees of flexion   · No active elbow extension x4-6 weeks   · Continue shoulder PROM exercises   · Light isometric biceps at 60 degrees flexion   · Initiate ER/IR tubing at 0 degrees adduction   · Manual scapular neuromuscular exercises (seated)   · Continue shoulder, elbow, wrist PROM   · Continue with ice and compression     Weeks 5-6 (25-25)  · ROM brace: increase ROM to 0 to full flexion at 6 to 7 weeks gradually   · Initiate light shoulder and scapular strengthening exercises at 6 weeks   Discontinue elbow brace after 6 weeks  "    Weeks 6-8 (7/11/25-7/25/25)  · ROM full   · Initiate light isotonic strengthening for shoulder and scapula, and elbow   · Continue ice prn     Weeks 8-12   · Progress strengthening exercises for elbow and shoulder   POC expires Unit limit Auth  expiration date PT/OT + Visit Limit?   9/2/25  9 thru 12/6 30 pcy                 Visit/Unit Tracking  AUTH Status:  Date 6/10 IE 6/17 6/24 7/8           Auth req, 30 pcy. High copay Used 1 2 3 4            Remaining  8 7 6 5                Manuals 6/10 IE 6/17 6/24 7/8     Scar/incision care   Scar massage 8' IASTM scar 8'     Edema control Size F TG                          Neuro Re-Ed         Prone I, Y, T  3x10 ea 3x10 ea      Prone row  3x10 x30      IR with TB   GTB x30 Pavel TB x30     ER with TB   GTB x30 Pavel TB 3x10     Mid Rows with TB    Pavel TB 30 x 3\"                       Ther Ex         Wrist AROM X20 ext/flex        FA AROM X20 p/s        Elbow AROM Active flexion to 60 degrees in brace   Cones on/off shelf x3 rounds (hold elbow flexion and reach behind head for 3 seconds)    Supine overhead elbow ext 3x15     Table slides Flex 20 x 3\" Flex 3x10 x 3\"  Abd 3x10 x 3\"  Horz abd 3x10 x 3\"       Scap retraction 20 x 3\"        Pendulums CW x20, CCW x20 CW x30, CCW x30        strength  RTP x20 HG 75# 3x10      Finger spreads  OEW 3x10 ext and  OEW ext and  x30      Wrist PREs         FA PREs   GFB 3x10 palm up, down 5# cuff wt on FB 3x10 p/s     UBE    Lv 1 4' F, 4' R(8' total)     Ther Activity                           HEP                           Modalities                                       "

## 2025-07-09 VITALS — WEIGHT: 180.6 LBS | BODY MASS INDEX: 25.86 KG/M2 | HEIGHT: 70 IN

## 2025-07-09 DIAGNOSIS — Z09 POSTOP CHECK: Primary | ICD-10-CM

## 2025-07-09 PROCEDURE — 99024 POSTOP FOLLOW-UP VISIT: CPT | Performed by: ORTHOPAEDIC SURGERY

## 2025-07-09 NOTE — PROGRESS NOTES
[facility] [address] [address] [phone]       Moy Dickerson  [id number]  1960    ORTHOPAEDIC SURGERY OUTPATIENT NOTE  7/9/2025    :  Assessment & Plan  Postop check  Patient is now 6 weeks s/p LEFT triceps tendon repair, date of surgery 5/30/25, doing well  Continue formal OT/HEP as per post operative protocol  May discontinue use of his IROM brace today.   Continue Tylenol prn  Follow up in 6 weeks for re evaluation of symptoms.            HISTORY:  64 y.o. male  presents today status post left triceps tendon repair, date of surgery 5/30/2025. Patient is doing well. He states his pain is controlled. SANE score is 65%. Pain level is 0/10. Patient reports that he has only returned to very light chipping and putting without difficulty. He has been compliant with the use of his brace but left it in his car today. He has been taking Tylenol prn. Overall, he is very happy with his progress so far post operatively. No numbness or tingling. No fevers or chills.     Past Medical History[1]    Past Surgical History[2]    Social History     Socioeconomic History    Marital status: /Civil Union     Spouse name: Not on file    Number of children: Not on file    Years of education: Not on file    Highest education level: Not on file   Occupational History    Not on file   Tobacco Use    Smoking status: Every Day     Current packs/day: 0.50     Types: Cigarettes     Passive exposure: Current    Smokeless tobacco: Never   Vaping Use    Vaping status: Never Used   Substance and Sexual Activity    Alcohol use: No    Drug use: No    Sexual activity: Not on file   Other Topics Concern    Not on file   Social History Narrative    Not on file     Social Drivers of Health     Financial Resource Strain: Not on file   Food Insecurity: Not on file   Transportation Needs: Not on file   Physical Activity: Not on file   Stress: Not on  "file   Social Connections: Not on file   Intimate Partner Violence: Not on file   Housing Stability: Not on file       Family History[3]     Patient's Medications   New Prescriptions    No medications on file   Previous Medications    ALBUTEROL (2.5 MG/3 ML) 0.083 % NEBULIZER SOLUTION    Take 3 mL (2.5 mg total) by nebulization every 6 (six) hours as needed for wheezing or shortness of breath    ANORO ELLIPTA 62.5-25 MCG/ACT INHALER    Inhale 1 puff daily    APIXABAN (ELIQUIS) 5 MG    TAKE ONE TABLET BY MOUTH TWICE A DAY    ASPIRIN 81 MG TABLET    Take 1 tablet by mouth in the morning.    BUDESON-GLYCOPYRROL-FORMOTEROL 160-9-4.8 MCG/ACT AERO    Inhale 2 puffs 2 (two) times a day Rinse mouth after use.    CARVEDILOL (COREG) 3.125 MG TABLET    TAKE ONE TABLET BY MOUTH TWICE A DAY    FLUTICASONE-UMECLIDINIUM-VILANTEROL (TRELEGY ELLIPTA) 200-62.5-25 MCG/ACTUATION AEPB INHALER    Inhale 1 puff daily Rinse mouth after use.    LORAZEPAM (ATIVAN) 0.5 MG TABLET    Take 1 tablet (0.5 mg total) by mouth daily at bedtime as needed for anxiety    LOSARTAN (COZAAR) 50 MG TABLET    Take 1 tablet (50 mg total) by mouth daily    MONTELUKAST (SINGULAIR) 10 MG TABLET    Take 10 mg by mouth daily at bedtime    PREDNISONE 5 MG TABLET    20 mg PO daily x 1 day, then 15 mg PO daily x 1 day, then 10mg PO daily x 1 days then 5 mg PO daily   Modified Medications    No medications on file   Discontinued Medications    No medications on file       Allergies[4]     Ht 5' 10\" (1.778 m)   Wt 81.9 kg (180 lb 9.6 oz)   BMI 25.91 kg/m²      REVIEW OF SYSTEMS:  Constitutional: Negative.    HEENT: Negative.    Respiratory: Negative.    Skin: Negative.    Neurological: Negative.    Psychiatric/Behavioral: Negative.  Musculoskeletal: Negative except for that mentioned in the HPI.    Gen: No acute distress, appears comfortable at rest  HEENT: Eyes clear, moist mucus membranes, hearing intact  Respiratory: No audible wheezing or stridor  Cardiovascular: " Well Perfused peripherally, 2+ distal pulse  Abdomen: nondistended, no peritoneal signs     PHYSICAL EXAM:    Let Elbow:  Surgical incision is well healed, No signs of infection  Full ROM without pain  Strength testing intact, 4/5 triceps  5/5 Motor to the APB, FDI, FDP2, FDP5, EDC. Sensation intact to light touch in the median, radial, and ulnar nerve distribution.    IMAGING:  No new imaging    Scribe Attestation      I,:  Rogers Mckeon am acting as a scribe while in the presence of the attending physician.:       I,:  Geovanny Landon DO personally performed the services described in this documentation    as scribed in my presence.:                  [1]   Past Medical History:  Diagnosis Date    Allergic     Asthma     Cardiomyopathy (HCC)     COPD (chronic obstructive pulmonary disease) (HCC)     Hypertension     Pneumonia     x 3   [2]   Past Surgical History:  Procedure Laterality Date    CARDIAC CATHETERIZATION      x 2    WI RINSJ RPTD BICEPS/TRICEPS TDN DSTL W/WO TDN GRF Left 5/30/2025    Procedure: REPAIR TENDON, TRICEPS;  Surgeon: Geovanny Landon DO;  Location:  MAIN OR;  Service: Orthopedics   [3]   Family History  Problem Relation Name Age of Onset    Deep vein thrombosis Father Amilcar Dickerson    [4]   Allergies  Allergen Reactions    Naproxen GI Intolerance    Pollen Extract Sneezing     Other reaction(s): Unknown

## 2025-07-09 NOTE — ASSESSMENT & PLAN NOTE
Patient is now 6 weeks s/p LEFT triceps tendon repair, date of surgery 5/30/25, doing well  Continue formal OT/HEP as per post operative protocol  May discontinue use of his IROM brace today.   Continue Tylenol prn  Follow up in 6 weeks for re evaluation of symptoms.

## 2025-07-15 ENCOUNTER — EVALUATION (OUTPATIENT)
Dept: OCCUPATIONAL THERAPY | Facility: CLINIC | Age: 65
End: 2025-07-15
Attending: ORTHOPAEDIC SURGERY
Payer: COMMERCIAL

## 2025-07-15 DIAGNOSIS — S46.312A RUPTURE OF LEFT TRICEPS TENDON, INITIAL ENCOUNTER: Primary | ICD-10-CM

## 2025-07-15 PROCEDURE — 97112 NEUROMUSCULAR REEDUCATION: CPT

## 2025-07-15 PROCEDURE — 97110 THERAPEUTIC EXERCISES: CPT

## 2025-08-04 DIAGNOSIS — I10 ESSENTIAL HYPERTENSION: ICD-10-CM

## 2025-08-06 RX ORDER — LOSARTAN POTASSIUM 50 MG/1
50 TABLET ORAL DAILY
Qty: 30 TABLET | Refills: 0 | Status: SHIPPED | OUTPATIENT
Start: 2025-08-06

## 2025-08-19 ENCOUNTER — EVALUATION (OUTPATIENT)
Dept: OCCUPATIONAL THERAPY | Facility: CLINIC | Age: 65
End: 2025-08-19
Attending: ORTHOPAEDIC SURGERY
Payer: COMMERCIAL

## 2025-08-19 DIAGNOSIS — S46.312D RUPTURE OF LEFT TRICEPS TENDON, SUBSEQUENT ENCOUNTER: Primary | ICD-10-CM

## 2025-08-19 PROCEDURE — 97140 MANUAL THERAPY 1/> REGIONS: CPT

## 2025-08-21 ENCOUNTER — OFFICE VISIT (OUTPATIENT)
Dept: OBGYN CLINIC | Facility: CLINIC | Age: 65
End: 2025-08-21

## 2025-08-21 VITALS — HEIGHT: 70 IN | BODY MASS INDEX: 25.77 KG/M2 | WEIGHT: 180 LBS

## 2025-08-21 DIAGNOSIS — Z47.89 AFTERCARE FOLLOWING SURGERY OF THE MUSCULOSKELETAL SYSTEM: Primary | ICD-10-CM

## 2025-08-21 PROCEDURE — 99024 POSTOP FOLLOW-UP VISIT: CPT | Performed by: ORTHOPAEDIC SURGERY

## (undated) DEVICE — DISPOSABLE EQUIPMENT COVER: Brand: SMALL TOWEL DRAPE

## (undated) DEVICE — ARM SLING: Brand: DEROYAL

## (undated) DEVICE — MAYO STAND COVER: Brand: CONVERTORS

## (undated) DEVICE — GROUNDING PAD UNIVERSAL SLW

## (undated) DEVICE — U-DRAPE: Brand: CONVERTORS

## (undated) DEVICE — CUFF TOURNIQUET 18 X 5.5 IN QUICK CONNECT 2BLA

## (undated) DEVICE — GLOVE INDICATOR PI UNDERGLOVE SZ 8 BLUE

## (undated) DEVICE — NEPTUNE E-SEP SMOKE EVACUATION PENCIL, COATED, 70MM BLADE, PUSH BUTTON SWITCH: Brand: NEPTUNE E-SEP

## (undated) DEVICE — ACE WRAP 4 IN STERILE

## (undated) DEVICE — EXOFIN PRECISION PEN HIGH VISCOSITY TOPICAL SKIN ADHESIVE: Brand: EXOFIN PRECISION PEN, 1G

## (undated) DEVICE — GLOVE SRG BIOGEL 8

## (undated) DEVICE — ACE WRAP 6 IN STERILE

## (undated) DEVICE — STERILE BETHLEHEM PLASTIC HAND: Brand: CARDINAL HEALTH

## (undated) DEVICE — SUT MONOCRYL 2-0 CT-1 27 IN Y339H

## (undated) DEVICE — PADDING CAST 4 IN  COTTON STRL

## (undated) DEVICE — NEEDLE FREE TAPERED WITH NITNOL LOOP

## (undated) DEVICE — DRESSING MEPILEX AG BORDER POST-OP 4 X 6 IN

## (undated) DEVICE — COBAN 4 IN STERILE

## (undated) DEVICE — SUT STRATAFIX SPIRAL MONOCRYL PLUS 3-0 PS-2 45CM SXMP1B107

## (undated) DEVICE — GLOVE SRG BIOGEL 7.5